# Patient Record
Sex: MALE | Race: WHITE | Employment: OTHER | URBAN - METROPOLITAN AREA
[De-identification: names, ages, dates, MRNs, and addresses within clinical notes are randomized per-mention and may not be internally consistent; named-entity substitution may affect disease eponyms.]

---

## 2017-01-09 ENCOUNTER — OFFICE VISIT (OUTPATIENT)
Dept: FAMILY MEDICINE CLINIC | Facility: CLINIC | Age: 82
End: 2017-01-09

## 2017-01-09 ENCOUNTER — LAB ENCOUNTER (OUTPATIENT)
Dept: LAB | Age: 82
End: 2017-01-09
Attending: FAMILY MEDICINE
Payer: MEDICARE

## 2017-01-09 VITALS
BODY MASS INDEX: 26.98 KG/M2 | RESPIRATION RATE: 16 BRPM | WEIGHT: 178 LBS | DIASTOLIC BLOOD PRESSURE: 72 MMHG | HEART RATE: 60 BPM | SYSTOLIC BLOOD PRESSURE: 126 MMHG | HEIGHT: 68 IN | TEMPERATURE: 98 F

## 2017-01-09 DIAGNOSIS — Z00.00 LABORATORY EXAMINATION ORDERED AS PART OF A ROUTINE GENERAL MEDICAL EXAMINATION: Primary | ICD-10-CM

## 2017-01-09 DIAGNOSIS — E11.9 TYPE 2 DIABETES MELLITUS WITHOUT COMPLICATION, WITHOUT LONG-TERM CURRENT USE OF INSULIN (HCC): ICD-10-CM

## 2017-01-09 DIAGNOSIS — Z00.00 LABORATORY EXAMINATION ORDERED AS PART OF A ROUTINE GENERAL MEDICAL EXAMINATION: ICD-10-CM

## 2017-01-09 LAB
ALBUMIN SERPL-MCNC: 3.9 G/DL (ref 3.5–4.8)
ALP LIVER SERPL-CCNC: 99 U/L (ref 45–117)
ALT SERPL-CCNC: 61 U/L (ref 17–63)
AST SERPL-CCNC: 32 U/L (ref 15–41)
BASOPHILS # BLD AUTO: 0.02 X10(3) UL (ref 0–0.1)
BASOPHILS NFR BLD AUTO: 0.3 %
BILIRUB SERPL-MCNC: 0.9 MG/DL (ref 0.1–2)
BUN BLD-MCNC: 16 MG/DL (ref 8–20)
CALCIUM BLD-MCNC: 8.7 MG/DL (ref 8.3–10.3)
CHLORIDE: 105 MMOL/L (ref 101–111)
CHOLEST SMN-MCNC: 139 MG/DL (ref ?–200)
CO2: 28 MMOL/L (ref 22–32)
CREAT BLD-MCNC: 1 MG/DL (ref 0.7–1.3)
EOSINOPHIL # BLD AUTO: 0.05 X10(3) UL (ref 0–0.3)
EOSINOPHIL NFR BLD AUTO: 0.6 %
ERYTHROCYTE [DISTWIDTH] IN BLOOD BY AUTOMATED COUNT: 11.9 % (ref 11.5–16)
GLUCOSE BLD-MCNC: 129 MG/DL (ref 70–99)
HCT VFR BLD AUTO: 46.1 % (ref 37–53)
HDLC SERPL-MCNC: 44 MG/DL (ref 45–?)
HDLC SERPL: 3.16 {RATIO} (ref ?–4.97)
HGB BLD-MCNC: 15.9 G/DL (ref 13–17)
IMMATURE GRANULOCYTE COUNT: 0.03 X10(3) UL (ref 0–1)
IMMATURE GRANULOCYTE RATIO %: 0.4 %
LDLC SERPL CALC-MCNC: 66 MG/DL (ref ?–130)
LYMPHOCYTES # BLD AUTO: 1.65 X10(3) UL (ref 0.9–4)
LYMPHOCYTES NFR BLD AUTO: 21.3 %
M PROTEIN MFR SERPL ELPH: 7.3 G/DL (ref 6.1–8.3)
MCH RBC QN AUTO: 30.8 PG (ref 27–33.2)
MCHC RBC AUTO-ENTMCNC: 34.5 G/DL (ref 31–37)
MCV RBC AUTO: 89.3 FL (ref 80–99)
MONOCYTES # BLD AUTO: 0.55 X10(3) UL (ref 0.1–0.6)
MONOCYTES NFR BLD AUTO: 7.1 %
NEUTROPHIL ABS PRELIM: 5.43 X10 (3) UL (ref 1.3–6.7)
NEUTROPHILS # BLD AUTO: 5.43 X10(3) UL (ref 1.3–6.7)
NEUTROPHILS NFR BLD AUTO: 70.3 %
NONHDLC SERPL-MCNC: 95 MG/DL (ref ?–130)
PLATELET # BLD AUTO: 258 10(3)UL (ref 150–450)
POTASSIUM SERPL-SCNC: 4.4 MMOL/L (ref 3.6–5.1)
PSA SERPL-MCNC: <0.01 NG/ML (ref 0.01–4)
RBC # BLD AUTO: 5.16 X10(6)UL (ref 3.8–5.8)
RED CELL DISTRIBUTION WIDTH-SD: 38.4 FL (ref 35.1–46.3)
SODIUM SERPL-SCNC: 139 MMOL/L (ref 136–144)
TRIGLYCERIDES: 145 MG/DL (ref ?–150)
TSI SER-ACNC: 0.56 MIU/ML (ref 0.35–5.5)
VLDL: 29 MG/DL (ref 5–40)
WBC # BLD AUTO: 7.7 X10(3) UL (ref 4–13)

## 2017-01-09 PROCEDURE — 85025 COMPLETE CBC W/AUTO DIFF WBC: CPT

## 2017-01-09 PROCEDURE — 36415 COLL VENOUS BLD VENIPUNCTURE: CPT

## 2017-01-09 PROCEDURE — 80061 LIPID PANEL: CPT

## 2017-01-09 PROCEDURE — 84153 ASSAY OF PSA TOTAL: CPT

## 2017-01-09 PROCEDURE — 84443 ASSAY THYROID STIM HORMONE: CPT

## 2017-01-09 PROCEDURE — 80053 COMPREHEN METABOLIC PANEL: CPT

## 2017-01-09 PROCEDURE — 99213 OFFICE O/P EST LOW 20 MIN: CPT | Performed by: FAMILY MEDICINE

## 2017-01-09 RX ORDER — GLIPIZIDE 2.5 MG/1
TABLET, EXTENDED RELEASE ORAL
Qty: 180 TABLET | Refills: 0 | Status: SHIPPED | OUTPATIENT
Start: 2017-01-09 | End: 2017-04-14

## 2017-01-09 RX ORDER — SOLIFENACIN SUCCINATE 10 MG/1
10 TABLET, FILM COATED ORAL
Qty: 90 TABLET | Refills: 3 | Status: CANCELLED | OUTPATIENT
Start: 2017-01-09

## 2017-01-09 RX ORDER — ROSUVASTATIN CALCIUM 10 MG/1
TABLET, COATED ORAL
Qty: 180 TABLET | Refills: 3 | Status: CANCELLED | OUTPATIENT
Start: 2017-01-09

## 2017-01-09 RX ORDER — GLIPIZIDE 2.5 MG/1
TABLET, EXTENDED RELEASE ORAL
Qty: 180 TABLET | Refills: 1 | Status: CANCELLED | OUTPATIENT
Start: 2017-01-09

## 2017-01-09 RX ORDER — SOLIFENACIN SUCCINATE 10 MG/1
10 TABLET, FILM COATED ORAL
Qty: 90 TABLET | Refills: 1 | Status: SHIPPED | OUTPATIENT
Start: 2017-01-09 | End: 2017-04-14 | Stop reason: ALTCHOICE

## 2017-01-09 RX ORDER — SIMVASTATIN 10 MG
10 TABLET ORAL DAILY
Qty: 90 TABLET | Refills: 1 | Status: SHIPPED | OUTPATIENT
Start: 2017-01-09 | End: 2017-04-14

## 2017-01-09 NOTE — PROGRESS NOTES
Type II diabetic here for general review he has worked hard at maintaining his A1c less than 6. He is primarily on glipizid. He takes ACE inhibitor and aspirin. In a happy note is about the celebrate his 89th birthday.     Skin is normal feet are under g

## 2017-01-24 ENCOUNTER — TELEPHONE (OUTPATIENT)
Dept: FAMILY MEDICINE CLINIC | Facility: CLINIC | Age: 82
End: 2017-01-24

## 2017-03-14 ENCOUNTER — TELEPHONE (OUTPATIENT)
Dept: FAMILY MEDICINE CLINIC | Facility: CLINIC | Age: 82
End: 2017-03-14

## 2017-03-14 RX ORDER — LOSARTAN POTASSIUM 100 MG/1
100 TABLET ORAL
Qty: 90 TABLET | Refills: 1 | Status: SHIPPED | OUTPATIENT
Start: 2017-03-14 | End: 2017-09-15

## 2017-03-14 RX ORDER — OMEPRAZOLE 20 MG/1
CAPSULE, DELAYED RELEASE ORAL
Qty: 90 CAPSULE | Refills: 3 | Status: SHIPPED | OUTPATIENT
Start: 2017-03-14 | End: 2018-03-12

## 2017-03-29 ENCOUNTER — TELEPHONE (OUTPATIENT)
Dept: FAMILY MEDICINE CLINIC | Facility: CLINIC | Age: 82
End: 2017-03-29

## 2017-04-14 ENCOUNTER — OFFICE VISIT (OUTPATIENT)
Dept: FAMILY MEDICINE CLINIC | Facility: CLINIC | Age: 82
End: 2017-04-14

## 2017-04-14 VITALS
SYSTOLIC BLOOD PRESSURE: 118 MMHG | RESPIRATION RATE: 18 BRPM | WEIGHT: 185 LBS | TEMPERATURE: 97 F | HEART RATE: 78 BPM | HEIGHT: 68 IN | BODY MASS INDEX: 28.04 KG/M2 | DIASTOLIC BLOOD PRESSURE: 70 MMHG

## 2017-04-14 DIAGNOSIS — E11.311: Primary | ICD-10-CM

## 2017-04-14 PROCEDURE — 99213 OFFICE O/P EST LOW 20 MIN: CPT | Performed by: FAMILY MEDICINE

## 2017-04-14 RX ORDER — VIT A/VIT C/VIT E/ZINC/COPPER 2148-113
TABLET ORAL
COMMUNITY
End: 2017-10-06

## 2017-04-14 RX ORDER — OXYBUTYNIN CHLORIDE 5 MG/1
5 TABLET, EXTENDED RELEASE ORAL DAILY
Qty: 90 TABLET | Refills: 3 | Status: SHIPPED | OUTPATIENT
Start: 2017-04-14 | End: 2017-04-29

## 2017-04-14 RX ORDER — AMLODIPINE BESYLATE 2.5 MG/1
2.5 TABLET ORAL
Qty: 90 TABLET | Refills: 3 | Status: SHIPPED | OUTPATIENT
Start: 2017-04-14 | End: 2017-04-29

## 2017-04-14 RX ORDER — SIMVASTATIN 10 MG
10 TABLET ORAL DAILY
Qty: 90 TABLET | Refills: 1 | Status: SHIPPED | OUTPATIENT
Start: 2017-04-14 | End: 2017-10-16

## 2017-04-14 RX ORDER — GLIPIZIDE 2.5 MG/1
TABLET, EXTENDED RELEASE ORAL
Qty: 180 TABLET | Refills: 0 | Status: SHIPPED | OUTPATIENT
Start: 2017-04-14 | End: 2017-07-18

## 2017-04-14 NOTE — PROGRESS NOTES
An elderly 31-year-old type II diabetic here for general care still having some issues with urinary frequency. The Vesicare did not seem to help as much as hoped.   Denies history of hypoglycemia I did take this time to review all his blood work which was mild non-bipolar depression. Assessment #3 metabolic syndrome. Assessment #4 BPH with obstruction    The plan stop Vesicare and start Lorri Butin and extended release 5 mg daily.   We will see him back for removal of the lesions and discussed the bene

## 2017-04-28 ENCOUNTER — OFFICE VISIT (OUTPATIENT)
Dept: FAMILY MEDICINE CLINIC | Facility: CLINIC | Age: 82
End: 2017-04-28

## 2017-04-28 VITALS
RESPIRATION RATE: 18 BRPM | SYSTOLIC BLOOD PRESSURE: 122 MMHG | DIASTOLIC BLOOD PRESSURE: 66 MMHG | BODY MASS INDEX: 29 KG/M2 | WEIGHT: 188.19 LBS | OXYGEN SATURATION: 98 % | TEMPERATURE: 98 F | HEART RATE: 75 BPM

## 2017-04-28 DIAGNOSIS — M25.522 PAIN OF BOTH ELBOWS: Primary | ICD-10-CM

## 2017-04-28 DIAGNOSIS — M25.521 PAIN OF BOTH ELBOWS: Primary | ICD-10-CM

## 2017-04-28 DIAGNOSIS — D36.13 NEUROFIBROMA OF LOWER EXTREMITY: ICD-10-CM

## 2017-04-28 PROCEDURE — 11400 EXC TR-EXT B9+MARG 0.5 CM<: CPT | Performed by: FAMILY MEDICINE

## 2017-04-28 NOTE — PROGRESS NOTES
Chronic but enlarging and symptomatic broad-based flesh-colored well demarcated soft lesion over lying the lateral aspect of the left knee does not look infected.   Neurovascular status normal    The assessment benign appearing neurofibroma left leg    The

## 2017-04-29 ENCOUNTER — APPOINTMENT (OUTPATIENT)
Dept: GENERAL RADIOLOGY | Facility: HOSPITAL | Age: 82
DRG: 378 | End: 2017-04-29
Attending: EMERGENCY MEDICINE
Payer: MEDICARE

## 2017-04-29 ENCOUNTER — HOSPITAL ENCOUNTER (INPATIENT)
Facility: HOSPITAL | Age: 82
LOS: 3 days | Discharge: HOME | DRG: 378 | End: 2017-05-02
Attending: EMERGENCY MEDICINE | Admitting: HOSPITALIST
Payer: MEDICARE

## 2017-04-29 DIAGNOSIS — K92.2 GASTROINTESTINAL HEMORRHAGE, UNSPECIFIED GASTROINTESTINAL HEMORRHAGE TYPE: Primary | ICD-10-CM

## 2017-04-29 PROCEDURE — 99223 1ST HOSP IP/OBS HIGH 75: CPT | Performed by: HOSPITALIST

## 2017-04-29 PROCEDURE — 71010 XR CHEST AP PORTABLE  (CPT=71010): CPT

## 2017-04-29 RX ORDER — SODIUM CHLORIDE 9 MG/ML
INJECTION, SOLUTION INTRAVENOUS CONTINUOUS
Status: DISCONTINUED | OUTPATIENT
Start: 2017-04-30 | End: 2017-05-01

## 2017-04-29 RX ORDER — SODIUM CHLORIDE 9 MG/ML
INJECTION, SOLUTION INTRAVENOUS CONTINUOUS
Status: CANCELLED | OUTPATIENT
Start: 2017-04-29 | End: 2017-04-29

## 2017-04-29 RX ORDER — DEXTROSE MONOHYDRATE 25 G/50ML
50 INJECTION, SOLUTION INTRAVENOUS
Status: DISCONTINUED | OUTPATIENT
Start: 2017-04-29 | End: 2017-05-01

## 2017-04-29 RX ORDER — ONDANSETRON 2 MG/ML
4 INJECTION INTRAMUSCULAR; INTRAVENOUS EVERY 4 HOURS PRN
Status: CANCELLED | OUTPATIENT
Start: 2017-04-29

## 2017-04-29 RX ORDER — ACETAMINOPHEN 325 MG/1
650 TABLET ORAL EVERY 6 HOURS PRN
Status: DISCONTINUED | OUTPATIENT
Start: 2017-04-29 | End: 2017-05-02

## 2017-04-29 RX ORDER — ONDANSETRON 2 MG/ML
4 INJECTION INTRAMUSCULAR; INTRAVENOUS EVERY 6 HOURS PRN
Status: DISCONTINUED | OUTPATIENT
Start: 2017-04-29 | End: 2017-05-02

## 2017-04-30 ENCOUNTER — ANESTHESIA (OUTPATIENT)
Dept: ENDOSCOPY | Facility: HOSPITAL | Age: 82
End: 2017-04-30

## 2017-04-30 ENCOUNTER — ANESTHESIA EVENT (OUTPATIENT)
Dept: ENDOSCOPY | Facility: HOSPITAL | Age: 82
End: 2017-04-30

## 2017-04-30 ENCOUNTER — SURGERY (OUTPATIENT)
Age: 82
End: 2017-04-30

## 2017-04-30 PROCEDURE — 99221 1ST HOSP IP/OBS SF/LOW 40: CPT | Performed by: FAMILY MEDICINE

## 2017-04-30 PROCEDURE — 99232 SBSQ HOSP IP/OBS MODERATE 35: CPT | Performed by: HOSPITALIST

## 2017-04-30 PROCEDURE — 0DJ08ZZ INSPECTION OF UPPER INTESTINAL TRACT, VIA NATURAL OR ARTIFICIAL OPENING ENDOSCOPIC: ICD-10-PCS | Performed by: STUDENT IN AN ORGANIZED HEALTH CARE EDUCATION/TRAINING PROGRAM

## 2017-04-30 RX ORDER — POTASSIUM CHLORIDE 14.9 MG/ML
20 INJECTION INTRAVENOUS ONCE
Status: COMPLETED | OUTPATIENT
Start: 2017-04-30 | End: 2017-04-30

## 2017-04-30 NOTE — OPERATIVE REPORT
EGD operative report  Patient Name: Miya Ortiz  Procedure: Esophagogastroduodenoscopy    Indication: hematochezia, melena  Attending: Meena Santa M.D. Consent:  The risks, benefits, and alternatives were discussed with the patient / POA.   Risks incl could be done as an outpatient, but given his age, will do tomorrow. He cannot recall his last colonoscopy. Recommendations:    - Clear liquid diet.   - NPO at midnight   - Colonoscopy with MAC tomorrow.   The potentially life-threatening complication of

## 2017-04-30 NOTE — H&P
AIYANA HOSPITALIST  History and Physical     Avery Sheldon Patient Status:  Emergency    1927 MRN [id number]   Location [facility] Attending Lu Cano MD   Hosp Day # 0 PCP Jyoti Emery DO     Chief Complaint: [address] N/A 2/25/2016    Comment Procedure: CYSTOSCOPY WITH URETHRAL DILATION;  Surgeon: Floresita Aj MD;  Location: [address] [place] [address]    PATIENT WITH PREOPERATIVE ORDER FOR IV ANTIBIOTIC SURGICA (CENTRUM SILVER) Oral Tab Take 1 Tab by mouth daily. Disp:  Rfl:    Omega-3 Fatty Acids (FISH OIL) 1000 MG Oral Cap Take  by mouth. Take two capsules daily Disp:  Rfl:    Magnesium 100 MG Oral Tab Take  by mouth.  Take two tablets daily Disp:  Rfl:        R CABG  1. Hold meds for now  3. DMII  1. SSI  4. GERD  1. IV PPI  5. Essential HTN  6. Dyslipidemia       Quality:  · DVT Prophylaxis: SCD  · CODE status: full  · Pérez: no    Plan of care discussed with patient.      Tessie Garcia DO  4/29/2017

## 2017-04-30 NOTE — ANESTHESIA PREPROCEDURE EVALUATION
PRE-OP EVALUATION    Patient Name: Nish Parish    Pre-op Diagnosis: Melena    Procedure(s): EGD      Surgeon(s) and Role:     * Xavier Rogers, MD - Primary    Pre-op vitals reviewed.   Temp: 98.6 °F (37 °C)  Pulse: 63  Resp: 18  BP: 136/65 mmHg  SpO2 200 each Rfl: 3   Aspirin 81 MG Oral Tab Take 81 mg by mouth daily. Disp:  Rfl:    Pyridoxine HCl ([employer] VITAMIN B-6) 200 MG Oral Tab Take 200 mg by mouth daily. Disp:  Rfl:    Multiple Vitamins-Minerals (CENTRUM SILVER) Oral Tab Take 1 Tab by mouth daily.  D Alcohol Use: No       Drug Use: No     Available pre-op labs reviewed.     Lab Results  Component Value Date   WBC 7.4 04/30/2017   RBC 3.91 04/30/2017   HGB 12.0* 04/30/2017   HCT 34.8* 04/30/2017   MCV 89.0 04/30/2017   MCH 30.7 04/30/2017   MCHC 34.5 04/

## 2017-04-30 NOTE — PLAN OF CARE
Maintains or returns to baseline bowel function Not Progressing      Electrolytes maintained within normal limits Not Progressing      Minimal or absence of nausea and vomiting Progressing      Glucose maintained within prescribed range Progressing      Re

## 2017-04-30 NOTE — ANESTHESIA POSTPROCEDURE EVALUATION
[address] [facility]. Patient Status:  Inpatient   Age/Gender 80year old male MRN [id number]   Location [facility]. Attending Dayton Machado MD   [facility] Day # 1 PCP Roopa Fears, DO       Anesthesia Post-op Note    Procedure(s):

## 2017-04-30 NOTE — ED INITIAL ASSESSMENT (HPI)
Since 6p this evening, patient had one tarry stool and one bright red stool, and also developed exertional dyspnea. Denies nausea/vomiting but notes diarrhea. Denies pain or dizziness of any kind at this time.

## 2017-04-30 NOTE — ADDENDUM NOTE
Addendum  created 04/30/17 1333 by Jude Carpenter MD    Modules edited: Anesthesia Review and Sign Navigator Section, Clinical Notes    Clinical Notes:  File: [id number]

## 2017-04-30 NOTE — PROGRESS NOTES
NURSING ADMISSION NOTE      Patient admitted via  Cart. Oriented to room. Safety precautions initiated. Bed in low position. Call light in reach. Pt aox4. Denies pain. V/S wnl. CBC q6hrs. Keep NPO.0.9NS infusing at 100cc/hr. So far no bloody stool. BS was 1

## 2017-04-30 NOTE — ED PROVIDER NOTES
Patient Seen in: [facility] Emergency Department    History   Patient presents with:  GI Bleeding (gastrointestinal)    Stated Complaint: gi bleeding    HPI    Patient is a pleasant 55-year-old male, presenting for evaluation of rectal bleeding.   Pa SURGICAL SITE INFECT N/A 2/25/2016    Comment Procedure: CYSTOSCOPY WITH URETHRAL DILATION;  Surgeon: Bin Ortega MD;  Location: [address] [place] [address]    PATIENT DOCUMENTED NOT TO HAVE EXPERIENCED ANY OF THE FOLLOWING EVENTS N/A 2/25/2016    Commen 2008 105   Resp 04/29/17 2008 20   Temp 04/29/17 2008 97.1 °F (36.2 °C)   Temp src 04/29/17 2008 Temporal   SpO2 04/29/17 2008 97 %   O2 Device 04/29/17 2008 None (Room air)       Current:BP 97/77 mmHg  Pulse 94  Temp(Src) 97.1 °F (36.2 °C) (Temporal)  Res following orders were created for panel order CBC WITH DIFFERENTIAL WITH PLATELET.   Procedure                               Abnormality         Status                     ---------                               -----------         ------ Madai Gould MD on 4/29/2017 at 21:33     Approved by: Rafiq Shultz MD            MDM     Patient was placed on a cart, an IV was established, and blood was drawn and sent to the laboratory for further evaluation.  The patient was attached to a cardiac mon

## 2017-04-30 NOTE — PROGRESS NOTES
AIYANA HOSPITALIST  Progress Note     Jeffreynabor Lord Patient Status:  Inpatient    1927 MRN [id number]   [facility] 4NW-A Attending Karen Day MD   Hosp Day # 1 PCP Torin Gary DO     Chief Complaint: GIB    S: Patient admits PLAN:     1. Gastrointestinal bleed  1. NPO  2. IVF  3. PPI  4. Monitor H&H  5. GI on consult  2. CAD sp CABG  3. Essential hypertension  4. Dyslipidemia  1. Aspirin, Losartan, Zocor  2. Monitor blood pressure and heart rate  5. Diabetes mellitus  1.  Hold

## 2017-05-01 ENCOUNTER — ANESTHESIA (OUTPATIENT)
Dept: ENDOSCOPY | Facility: HOSPITAL | Age: 82
End: 2017-05-01

## 2017-05-01 ENCOUNTER — ANESTHESIA EVENT (OUTPATIENT)
Dept: ENDOSCOPY | Facility: HOSPITAL | Age: 82
End: 2017-05-01

## 2017-05-01 ENCOUNTER — SURGERY (OUTPATIENT)
Age: 82
End: 2017-05-01

## 2017-05-01 PROCEDURE — 0DJD8ZZ INSPECTION OF LOWER INTESTINAL TRACT, VIA NATURAL OR ARTIFICIAL OPENING ENDOSCOPIC: ICD-10-PCS | Performed by: INTERNAL MEDICINE

## 2017-05-01 PROCEDURE — 99231 SBSQ HOSP IP/OBS SF/LOW 25: CPT | Performed by: HOSPITALIST

## 2017-05-01 RX ORDER — NALOXONE HYDROCHLORIDE 0.4 MG/ML
80 INJECTION, SOLUTION INTRAMUSCULAR; INTRAVENOUS; SUBCUTANEOUS AS NEEDED
Status: ACTIVE | OUTPATIENT
Start: 2017-05-01 | End: 2017-05-01

## 2017-05-01 RX ORDER — POTASSIUM CHLORIDE 20 MEQ/1
40 TABLET, EXTENDED RELEASE ORAL ONCE
Status: COMPLETED | OUTPATIENT
Start: 2017-05-01 | End: 2017-05-01

## 2017-05-01 RX ORDER — SODIUM CHLORIDE, SODIUM LACTATE, POTASSIUM CHLORIDE, CALCIUM CHLORIDE 600; 310; 30; 20 MG/100ML; MG/100ML; MG/100ML; MG/100ML
INJECTION, SOLUTION INTRAVENOUS CONTINUOUS
Status: ACTIVE | OUTPATIENT
Start: 2017-05-01 | End: 2017-05-01

## 2017-05-01 RX ORDER — DEXTROSE MONOHYDRATE 25 G/50ML
50 INJECTION, SOLUTION INTRAVENOUS
Status: DISCONTINUED | OUTPATIENT
Start: 2017-05-01 | End: 2017-05-02

## 2017-05-01 NOTE — PLAN OF CARE
Passing large amounts of yellowish,dark brownish liquids from rectum with just little stool sediments.

## 2017-05-01 NOTE — PROGRESS NOTES
AIYANA HOSPITALIST  Progress Note     Alexsandra Slipper Patient Status:  Inpatient    1927 MRN [id number]   [facility] 4NW-A Attending Army Radha MD   Hosp Day # 2 PCP Héctor Correach,      Chief Complaint: GIB    S: Patient irritab ER  40 mEq Oral Once   • insulin aspart  1-5 Units Subcutaneous TID CC and HS       ASSESSMENT / PLAN:     1. Gastrointestinal bleed, egd unremarkable 4/30  1. NPO  2. Stop IVF  3. For cscope today  4. GI on consult  2. CAD sp CABG  3.  Essential hypertensi

## 2017-05-01 NOTE — PLAN OF CARE
Maintains or returns to baseline bowel function Not Progressing      Minimal or absence of nausea and vomiting Progressing      Glucose maintained within prescribed range Progressing      Electrolytes maintained within normal limits Progressing      Return

## 2017-05-01 NOTE — ANESTHESIA POSTPROCEDURE EVALUATION
[facility]. Patient Status:  Inpatient   Age/Gender 80year old male MRN [id number]   Location 118 [facility]. Attending Max Stevens MD   [facility] Day # 2 PCP Lily Iqbal DO       Anesthesia Post-op Note    Procedure(s):

## 2017-05-01 NOTE — OPERATIVE REPORT
Operative Report-Colonoscopy    PREOPERATIVE DIAGNOSIS/INDICATION:  1. Hematochezia  2. Acute blood loss anemia  POSTOPERTATIVE DIAGNOSIS:  1.  Colonic ulcer at 40 cm with flat spot, associated with diverticu hemorrhoids.   RECOMMENDATIONS:   - Post Colonoscopy/polypectomy precautions, watch for bleeding, infection, perforation, adverse drug reactions   - Suspect above findings reflect a diverticular bleed which has since ceased  - Advance diet to soft  - Home i

## 2017-05-01 NOTE — ANESTHESIA PREPROCEDURE EVALUATION
PRE-OP EVALUATION    Patient Name: Neel Anand    Pre-op Diagnosis: Melena    Procedure(s): EGD      Surgeon(s) and Role:     * Carla Rogers MD - Primary    Pre-op vitals reviewed.   Temp: 97.9 °F (36.6 °C)  Pulse: 62  Resp: 18  BP: 146/68 mmHg  Sp omeprazole 20 MG Oral Capsule Delayed Release TAKE ONE CAPSULE (20MG) BY MOUTH EVERY DAY Disp: 90 capsule Rfl: 3   ONETOUCH ULTRA BLUE In Vitro Strip TEST TWO TIMES DAILY Disp: 200 strip Rfl: 3   ONETOUCH ULTRASOFT LANCETS Does not apply Misc TEST TWO TI DOCUMENTED NOT TO HAVE EXPERIENCED ANY OF THE FOLLOWING EVENTS N/A 2/25/2016    Comment Procedure: CYSTOSCOPY WITH URETHRAL DILATION;  Surgeon: Smooth Gar MD;  Location: [address] [place] [address]        Smoking status: Former Smoker     Smokeless toba

## 2017-05-01 NOTE — CM/SW NOTE
Attempted to complete CM/SW assessment. Pt is off the floor for a colonoscopy. Updated Dick Perez  on above.

## 2017-05-01 NOTE — CONSULTS
Gastroenterology Consult  I have personally seen and examined the patient.     Patient Name: Dominick Yip  Referring Physician: Dr Zainab Dove  Reason for Consult: hematochezia  CC: hematochezia  HPI: Dominick Yip is a 80year old male with a PMH of CAD, DMII, [facility]    Comment Procedure: CYSTOSCOPY WITH URETHRAL DILATION;  Surgeon: Kwadwo Proctor MD;  Location: [address] [place] [address]    PATIENT WITH PREOPERATIVE ORDER FOR IV ANTIBIOTIC CEM of colon cancer or other gastrointestinal malignancies; No family history of ulcer disease, or inflammatory bowel disease  ROS:  In addition to the pertinent positives described above:             Infectious Disease:  No chronic infections or recent fevers No hepatosplenomegaly; no rebound or guarding;  No ascites is clinically apparent; no tympany to percussion  Ext: No peripheral edema or cyanosis  Skin: Warm and dry  Psychiatric: Appropriate mood and congruent affect without obvious depression or anxiety

## 2017-05-01 NOTE — PLAN OF CARE
Minimal or absence of nausea and vomiting Progressing      Maintains or returns to baseline bowel function Progressing      Glucose maintained within prescribed range Progressing      Electrolytes maintained within normal limits Progressing      Assumed ca

## 2017-05-02 VITALS
SYSTOLIC BLOOD PRESSURE: 117 MMHG | HEART RATE: 65 BPM | DIASTOLIC BLOOD PRESSURE: 53 MMHG | TEMPERATURE: 98 F | HEIGHT: 68 IN | WEIGHT: 180.88 LBS | BODY MASS INDEX: 27.41 KG/M2 | OXYGEN SATURATION: 94 % | RESPIRATION RATE: 18 BRPM

## 2017-05-02 PROCEDURE — 99239 HOSP IP/OBS DSCHRG MGMT >30: CPT | Performed by: HOSPITALIST

## 2017-05-02 RX ORDER — LOSARTAN POTASSIUM 100 MG/1
100 TABLET ORAL
Status: DISCONTINUED | OUTPATIENT
Start: 2017-05-02 | End: 2017-05-02

## 2017-05-02 NOTE — DISCHARGE SUMMARY
AIYANA HOSPITALIST  DISCHARGE SUMMARY     Rehan Berrios Patient Status:  Inpatient    1927 MRN [id number]   [facility] 4NW-A Attending Max Stevens MD   [facility] Day # 3 PCP Lily Iqbal DO     Date of Admission: 2017  Date of loss with component of dilution, asymptomatic - no bleeding noted  1. Repeat H&H today  3. CAD sp CABG  4. Essential hypertension  5. Dyslipidemia  1. Aspirin on hold until cleared by GI  2. Resume Losartan  3. Zocor - held, resume on DC  4.  Monitor blood Follow-up appointment:   DO Kathe Unger [facility] [address] [place] [address]  [phone]    Call in 2 days      Alysia Jerome MD  [address] [place], [place] [id number]      As needed    This note is linked to that w

## 2017-05-02 NOTE — PLAN OF CARE
NURSING DISCHARGE NOTE    Discharged Home via Wheelchair. Accompanied by Family member  Belongings Taken by patient/family. AVS and education provided to patient and daughter.    Discharge plan of care and follow up needs discussed with patient/daug

## 2017-05-02 NOTE — PROGRESS NOTES
AIYANA HOSPITALIST  Progress Note     Sarthak Brown Patient Status:  Inpatient    1927 MRN [id number]   [facility] 4NW-A Attending Sorin Rausch MD   [facility] Day # 3 PCP Stephanie Lemus DO     Chief Complaint: GIB    S: Patient denies the last 72 hours. Imaging: Imaging data reviewed in Epic. Medications:   • losartan  100 mg Oral Daily   • insulin aspart  1-5 Units Subcutaneous TID CC and HS       ASSESSMENT / PLAN:     1.  Gastrointestinal bleed, egd unremarkable 4/30, cscop

## 2017-05-02 NOTE — PLAN OF CARE
A&Ox4, VSS. Denies any pain/discomfort, no SOB/dyspnea. Ambulatory, up with standby assist.   S/p colonoscopy 5/1, diverticulosis with no active bleeding. Tolerating low residue diet, no N/V/D. 1 BM this morning, soft and brown- no melena or BRPR noted.

## 2017-05-02 NOTE — PLAN OF CARE
Diabetes/Glucose Control    • Glucose maintained within prescribed range Progressing        GASTROINTESTINAL - ADULT    • Minimal or absence of nausea and vomiting Progressing    • Maintains or returns to baseline bowel function Progressing        METABOLI

## 2017-05-03 ENCOUNTER — PATIENT OUTREACH (OUTPATIENT)
Dept: CASE MANAGEMENT | Age: 82
End: 2017-05-03

## 2017-05-03 DIAGNOSIS — K92.2 GASTROINTESTINAL HEMORRHAGE, UNSPECIFIED GASTROINTESTINAL HEMORRHAGE TYPE: Primary | ICD-10-CM

## 2017-05-04 ENCOUNTER — TELEPHONE (OUTPATIENT)
Dept: FAMILY MEDICINE CLINIC | Facility: CLINIC | Age: 82
End: 2017-05-04

## 2017-05-04 NOTE — TELEPHONE ENCOUNTER
Offered pt appt tomorrow with Dr. Kristina Haro, pt refused appt, would only like to see Dr. Michael Marino, next available appt Monday at 12:30, appt made. Pt notified.

## 2017-05-04 NOTE — TELEPHONE ENCOUNTER
Patient discharged from [facility] on 5/2/17 and is at High risk for readmission, and should be seen by 5/9/17. NCM attempted to schedule TCM HFU patient declines at this time stating that he is going to call to schedule an appointment himself.      Sadaf Cintron

## 2017-05-04 NOTE — PROGRESS NOTES
Initial Post Discharge Follow Up   Discharge Date: 5/2/17  Contact Date: 5/3/2017    Consent Verification:  Assessment Completed With: Patient  HIPAA Verified?   Yes    Discharge Dx:   GI bleed    General:   • How have you been since your discharge from mouth daily. Disp:  Rfl:    Omega-3 Fatty Acids (FISH OIL) 1000 MG Oral Cap Take  by mouth. Take two capsules daily Disp:  Rfl:    Magnesium 100 MG Oral Tab Take  by mouth.  Take two tablets daily Disp:  Rfl:      • When you were leaving the hospital were a

## 2017-05-08 ENCOUNTER — TELEPHONE (OUTPATIENT)
Dept: FAMILY MEDICINE CLINIC | Facility: CLINIC | Age: 82
End: 2017-05-08

## 2017-05-18 ENCOUNTER — OFFICE VISIT (OUTPATIENT)
Dept: FAMILY MEDICINE CLINIC | Facility: CLINIC | Age: 82
End: 2017-05-18

## 2017-05-18 VITALS
BODY MASS INDEX: 28.49 KG/M2 | HEIGHT: 68 IN | RESPIRATION RATE: 18 BRPM | DIASTOLIC BLOOD PRESSURE: 64 MMHG | WEIGHT: 188 LBS | SYSTOLIC BLOOD PRESSURE: 112 MMHG | OXYGEN SATURATION: 97 % | HEART RATE: 68 BPM

## 2017-05-18 DIAGNOSIS — D22.72: Primary | ICD-10-CM

## 2017-05-18 DIAGNOSIS — D49.2 ABNORMAL SKIN GROWTH: ICD-10-CM

## 2017-05-18 PROCEDURE — 88341 IMHCHEM/IMCYTCHM EA ADD ANTB: CPT | Performed by: FAMILY MEDICINE

## 2017-05-18 PROCEDURE — 99213 OFFICE O/P EST LOW 20 MIN: CPT | Performed by: FAMILY MEDICINE

## 2017-05-18 PROCEDURE — 88305 TISSUE EXAM BY PATHOLOGIST: CPT | Performed by: FAMILY MEDICINE

## 2017-05-18 PROCEDURE — 88342 IMHCHEM/IMCYTCHM 1ST ANTB: CPT | Performed by: FAMILY MEDICINE

## 2017-05-18 NOTE — PROGRESS NOTES
Here for final removal of a 2 cm dome-shaped lesion overlying the head of the left proximal fibula I tied it off using the ligature strangulation and most of the lesion has become necrotic a small amount refuses to change requiring an excision and biopsy.

## 2017-05-30 ENCOUNTER — TELEPHONE (OUTPATIENT)
Dept: FAMILY MEDICINE CLINIC | Facility: CLINIC | Age: 82
End: 2017-05-30

## 2017-06-01 ENCOUNTER — LAB ENCOUNTER (OUTPATIENT)
Dept: LAB | Age: 82
End: 2017-06-01
Attending: FAMILY MEDICINE
Payer: MEDICARE

## 2017-06-01 ENCOUNTER — OFFICE VISIT (OUTPATIENT)
Dept: FAMILY MEDICINE CLINIC | Facility: CLINIC | Age: 82
End: 2017-06-01

## 2017-06-01 VITALS
HEIGHT: 68 IN | DIASTOLIC BLOOD PRESSURE: 62 MMHG | RESPIRATION RATE: 18 BRPM | BODY MASS INDEX: 27.74 KG/M2 | WEIGHT: 183 LBS | SYSTOLIC BLOOD PRESSURE: 118 MMHG | HEART RATE: 85 BPM | TEMPERATURE: 98 F | OXYGEN SATURATION: 96 %

## 2017-06-01 DIAGNOSIS — D49.2: ICD-10-CM

## 2017-06-01 DIAGNOSIS — D62 ACUTE BLOOD LOSS ANEMIA: Primary | ICD-10-CM

## 2017-06-01 DIAGNOSIS — D62 ACUTE BLOOD LOSS ANEMIA: ICD-10-CM

## 2017-06-01 PROCEDURE — 36415 COLL VENOUS BLD VENIPUNCTURE: CPT

## 2017-06-01 PROCEDURE — 85025 COMPLETE CBC W/AUTO DIFF WBC: CPT

## 2017-06-01 PROCEDURE — 99213 OFFICE O/P EST LOW 20 MIN: CPT | Performed by: FAMILY MEDICINE

## 2017-06-01 PROCEDURE — 82728 ASSAY OF FERRITIN: CPT

## 2017-06-01 PROCEDURE — 85045 AUTOMATED RETICULOCYTE COUNT: CPT

## 2017-06-01 NOTE — PROGRESS NOTES
Here on my request for follow-up of an operative site left lateral knee that I removed and sent for biopsy. 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     I discussed these findings with  the pa

## 2017-06-22 ENCOUNTER — TELEPHONE (OUTPATIENT)
Dept: FAMILY MEDICINE CLINIC | Facility: CLINIC | Age: 82
End: 2017-06-22

## 2017-06-22 RX ORDER — LANCETS
EACH MISCELLANEOUS
Qty: 200 EACH | Refills: 3 | Status: SHIPPED | OUTPATIENT
Start: 2017-06-22 | End: 2018-07-25

## 2017-07-18 RX ORDER — GLIPIZIDE 2.5 MG/1
TABLET, EXTENDED RELEASE ORAL
Qty: 180 TABLET | Refills: 0 | Status: SHIPPED | OUTPATIENT
Start: 2017-07-18 | End: 2017-09-21

## 2017-08-10 ENCOUNTER — OFFICE VISIT (OUTPATIENT)
Dept: FAMILY MEDICINE CLINIC | Facility: CLINIC | Age: 82
End: 2017-08-10

## 2017-08-10 VITALS
TEMPERATURE: 98 F | DIASTOLIC BLOOD PRESSURE: 80 MMHG | RESPIRATION RATE: 18 BRPM | SYSTOLIC BLOOD PRESSURE: 132 MMHG | BODY MASS INDEX: 28.34 KG/M2 | WEIGHT: 187 LBS | OXYGEN SATURATION: 98 % | HEIGHT: 68 IN | HEART RATE: 80 BPM

## 2017-08-10 DIAGNOSIS — E11.69 TYPE 2 DIABETES MELLITUS WITH OTHER SPECIFIED COMPLICATION (HCC): Primary | ICD-10-CM

## 2017-08-10 PROCEDURE — 99213 OFFICE O/P EST LOW 20 MIN: CPT | Performed by: FAMILY MEDICINE

## 2017-08-10 RX ORDER — OXYBUTYNIN CHLORIDE 5 MG/1
TABLET, EXTENDED RELEASE ORAL
Refills: 2 | COMMUNITY
Start: 2017-07-18 | End: 2017-09-21

## 2017-08-10 RX ORDER — AMLODIPINE BESYLATE 2.5 MG/1
TABLET ORAL
Refills: 2 | COMMUNITY
Start: 2017-07-18 | End: 2018-01-03 | Stop reason: ALTCHOICE

## 2017-08-10 NOTE — PROGRESS NOTES
Delgado Alcaraz is here primarily for 2 reasons first is to discuss his blood sugar control and second is to discuss his bladder urgency    Problem #1 has to do with his type 2 diabetes.   Although his fasting sugars in the morning are all within a wonderful range of 1

## 2017-08-22 ENCOUNTER — MYAURORA ACCOUNT LINK (OUTPATIENT)
Dept: OTHER | Age: 82
End: 2017-08-22

## 2017-08-22 ENCOUNTER — PRIOR ORIGINAL RECORDS (OUTPATIENT)
Dept: OTHER | Age: 82
End: 2017-08-22

## 2017-08-28 ENCOUNTER — TELEPHONE (OUTPATIENT)
Dept: FAMILY MEDICINE CLINIC | Facility: CLINIC | Age: 82
End: 2017-08-28

## 2017-08-28 NOTE — TELEPHONE ENCOUNTER
Pt states was on oxybutynin, this was discontinued at 8/10/17. Was given samples of vesicare 5mg, however pt states he was on 10mg previously and still has lots of pills left, okay for pt to take 10mg of vesicare?

## 2017-08-30 ENCOUNTER — TELEPHONE (OUTPATIENT)
Dept: FAMILY MEDICINE CLINIC | Facility: CLINIC | Age: 82
End: 2017-08-30

## 2017-09-15 RX ORDER — LOSARTAN POTASSIUM 100 MG/1
100 TABLET ORAL
Qty: 90 TABLET | Refills: 0 | Status: SHIPPED | OUTPATIENT
Start: 2017-09-15 | End: 2017-12-13

## 2017-09-21 ENCOUNTER — OFFICE VISIT (OUTPATIENT)
Dept: FAMILY MEDICINE CLINIC | Facility: CLINIC | Age: 82
End: 2017-09-21

## 2017-09-21 VITALS
RESPIRATION RATE: 18 BRPM | TEMPERATURE: 98 F | WEIGHT: 189 LBS | BODY MASS INDEX: 28.64 KG/M2 | HEIGHT: 68 IN | DIASTOLIC BLOOD PRESSURE: 60 MMHG | OXYGEN SATURATION: 98 % | SYSTOLIC BLOOD PRESSURE: 120 MMHG | HEART RATE: 69 BPM

## 2017-09-21 DIAGNOSIS — N32.81 OVERACTIVE BLADDER: Primary | ICD-10-CM

## 2017-09-21 PROCEDURE — 99213 OFFICE O/P EST LOW 20 MIN: CPT | Performed by: FAMILY MEDICINE

## 2017-09-21 RX ORDER — GLIPIZIDE 2.5 MG/1
TABLET, EXTENDED RELEASE ORAL
Qty: 90 TABLET | Refills: 6 | Status: SHIPPED | OUTPATIENT
Start: 2017-09-21 | End: 2017-10-06

## 2017-09-21 RX ORDER — OXYBUTYNIN CHLORIDE 10 MG/1
10 TABLET, EXTENDED RELEASE ORAL DAILY
Qty: 90 TABLET | Refills: 3 | Status: SHIPPED | OUTPATIENT
Start: 2017-09-21 | End: 2018-06-07 | Stop reason: ALTCHOICE

## 2017-09-21 NOTE — PROGRESS NOTES
Here for follow-up of his blood sugars and his urinary urgency and stress. He finds that the Vesicare is actually no more effective than his oxybutynin.   Because of the subsequent monetary differences we switched him today back to oxybutynin but at higher

## 2017-10-06 ENCOUNTER — OFFICE VISIT (OUTPATIENT)
Dept: FAMILY MEDICINE CLINIC | Facility: CLINIC | Age: 82
End: 2017-10-06

## 2017-10-06 ENCOUNTER — TELEPHONE (OUTPATIENT)
Dept: FAMILY MEDICINE CLINIC | Facility: CLINIC | Age: 82
End: 2017-10-06

## 2017-10-06 VITALS
HEIGHT: 68 IN | HEART RATE: 76 BPM | TEMPERATURE: 98 F | SYSTOLIC BLOOD PRESSURE: 134 MMHG | BODY MASS INDEX: 28.34 KG/M2 | RESPIRATION RATE: 18 BRPM | DIASTOLIC BLOOD PRESSURE: 78 MMHG | WEIGHT: 187 LBS

## 2017-10-06 DIAGNOSIS — I80.9 PHLEBITIS ALONE: Primary | ICD-10-CM

## 2017-10-06 PROCEDURE — 99214 OFFICE O/P EST MOD 30 MIN: CPT | Performed by: FAMILY MEDICINE

## 2017-10-06 RX ORDER — FUROSEMIDE 40 MG/1
TABLET ORAL
Qty: 120 TABLET | Refills: 1 | Status: SHIPPED | OUTPATIENT
Start: 2017-10-06 | End: 2017-10-06

## 2017-10-06 RX ORDER — FUROSEMIDE 20 MG/1
20 TABLET ORAL 2 TIMES DAILY
Qty: 120 TABLET | Refills: 1 | Status: SHIPPED | OUTPATIENT
Start: 2017-10-06 | End: 2018-01-29

## 2017-10-06 RX ORDER — GLIPIZIDE 2.5 MG/1
TABLET, EXTENDED RELEASE ORAL
Qty: 90 TABLET | Refills: 0 | COMMUNITY
Start: 2017-10-06 | End: 2017-11-14

## 2017-10-06 NOTE — PROGRESS NOTES
Per Dr. Bessie Carbajal,  Glipizide 2.5 mg dose change. Currently taking 1 tab in the am and 2 tabs in the evening. Per Dr. Bessie Carbajal to increase to 2 tabs in the am, 2 tabs in the evening. Patient informed. Med list amended.

## 2017-10-06 NOTE — PROGRESS NOTES
Arrives today because for the past 3-5 days he has developed nonpainful pitting swelling of both lower extremities especially in the right side in a stocking-like distribution denies trauma.   He has type II diabetic with some recent slight worsening of his

## 2017-10-13 ENCOUNTER — OFFICE VISIT (OUTPATIENT)
Dept: FAMILY MEDICINE CLINIC | Facility: CLINIC | Age: 82
End: 2017-10-13

## 2017-10-13 ENCOUNTER — LAB ENCOUNTER (OUTPATIENT)
Dept: LAB | Age: 82
End: 2017-10-13
Attending: FAMILY MEDICINE
Payer: MEDICARE

## 2017-10-13 VITALS
OXYGEN SATURATION: 98 % | RESPIRATION RATE: 18 BRPM | HEART RATE: 82 BPM | HEIGHT: 68 IN | SYSTOLIC BLOOD PRESSURE: 110 MMHG | DIASTOLIC BLOOD PRESSURE: 56 MMHG | TEMPERATURE: 98 F | WEIGHT: 188 LBS | BODY MASS INDEX: 28.49 KG/M2

## 2017-10-13 DIAGNOSIS — R53.83 FATIGUE, UNSPECIFIED TYPE: ICD-10-CM

## 2017-10-13 DIAGNOSIS — E11.9 DIABETES MELLITUS TYPE 2 IN NONOBESE (HCC): ICD-10-CM

## 2017-10-13 DIAGNOSIS — R60.9 EDEMA, UNSPECIFIED TYPE: ICD-10-CM

## 2017-10-13 DIAGNOSIS — R60.9 EDEMA, UNSPECIFIED TYPE: Primary | ICD-10-CM

## 2017-10-13 PROCEDURE — 83036 HEMOGLOBIN GLYCOSYLATED A1C: CPT

## 2017-10-13 PROCEDURE — 85025 COMPLETE CBC W/AUTO DIFF WBC: CPT

## 2017-10-13 PROCEDURE — 99213 OFFICE O/P EST LOW 20 MIN: CPT | Performed by: FAMILY MEDICINE

## 2017-10-13 PROCEDURE — 84443 ASSAY THYROID STIM HORMONE: CPT

## 2017-10-13 PROCEDURE — 90653 IIV ADJUVANT VACCINE IM: CPT | Performed by: FAMILY MEDICINE

## 2017-10-13 PROCEDURE — 80053 COMPREHEN METABOLIC PANEL: CPT

## 2017-10-13 PROCEDURE — 36415 COLL VENOUS BLD VENIPUNCTURE: CPT

## 2017-10-13 PROCEDURE — G0008 ADMIN INFLUENZA VIRUS VAC: HCPCS | Performed by: FAMILY MEDICINE

## 2017-10-13 NOTE — PROGRESS NOTES
Presents for suggested follow-up. His previously determined bilateral lower extremity edema is much improved and the redness is now absent. Antibiotics were obviously not indicated at this point. He does have some mild pitting nontender edema however. No

## 2017-10-16 RX ORDER — SIMVASTATIN 10 MG
10 TABLET ORAL DAILY
Qty: 90 TABLET | Refills: 0 | Status: SHIPPED | OUTPATIENT
Start: 2017-10-16 | End: 2018-01-29

## 2017-11-14 RX ORDER — GLIPIZIDE 2.5 MG/1
TABLET, EXTENDED RELEASE ORAL
Qty: 90 TABLET | Refills: 0 | Status: SHIPPED | OUTPATIENT
Start: 2017-11-14 | End: 2017-12-13

## 2017-11-27 ENCOUNTER — OFFICE VISIT (OUTPATIENT)
Dept: FAMILY MEDICINE CLINIC | Facility: CLINIC | Age: 82
End: 2017-11-27

## 2017-11-27 ENCOUNTER — TELEPHONE (OUTPATIENT)
Dept: FAMILY MEDICINE CLINIC | Facility: CLINIC | Age: 82
End: 2017-11-27

## 2017-11-27 VITALS — HEART RATE: 66 BPM | BODY MASS INDEX: 28 KG/M2 | OXYGEN SATURATION: 98 % | WEIGHT: 181 LBS | RESPIRATION RATE: 18 BRPM

## 2017-11-27 DIAGNOSIS — E11.8 TYPE 2 DIABETES MELLITUS WITH COMPLICATION, WITHOUT LONG-TERM CURRENT USE OF INSULIN (HCC): Primary | ICD-10-CM

## 2017-11-27 DIAGNOSIS — Z00.00 ROUTINE GENERAL MEDICAL EXAMINATION AT A HEALTH CARE FACILITY: ICD-10-CM

## 2017-11-27 PROCEDURE — G0439 PPPS, SUBSEQ VISIT: HCPCS | Performed by: FAMILY MEDICINE

## 2017-11-27 PROCEDURE — 99213 OFFICE O/P EST LOW 20 MIN: CPT | Performed by: FAMILY MEDICINE

## 2017-11-27 NOTE — PROGRESS NOTES
Here for adjustment of his blood sugars. He is still running, in spite of are changing his glipizide to 5 mg long-acting twice a day is running fasting sugars or postprandial sugars in the high 100s or sometimes in the mid to high 200s.   He does have urin

## 2017-11-27 NOTE — TELEPHONE ENCOUNTER
Pt given januvia at today's visit. Per Barbara Lozada do not stop glipizide. Januvia is in addition to the glipizide. Call pt after 3:00 to notify him.

## 2017-11-27 NOTE — TELEPHONE ENCOUNTER
Pt wants to know if he should stip the glypizide because Dr. Obed Sterling gave him a new RX today at his visit. Pls call to advise. Call around 3pm.  He will be out till then.

## 2017-11-28 ENCOUNTER — TELEPHONE (OUTPATIENT)
Dept: FAMILY MEDICINE CLINIC | Facility: CLINIC | Age: 82
End: 2017-11-28

## 2017-11-28 NOTE — TELEPHONE ENCOUNTER
Spoke with patient. 1) Januvia cost is too high for pt, 30 days is over $400.00  Please advise. FYI:   2) pt never increased his Oxybutynin dose from 5mg to 10mg as JG instructed in August 2017, but continued on the 5mg.  Pt will now switch to the 10mg

## 2017-12-01 NOTE — PROGRESS NOTES
Sachin Carrero is a 80year old male who presents for a Medicare Annual Wellness visit.      Patient Care Team: Patient Care Team:  Ziggy Castro DO as PCP - [facility] [place])    Patient Active Problem List:     Other testicular hypofunction tablet (10 mg total) by mouth daily. Disp: 90 tablet Rfl: 3   LOSARTAN 100 MG Oral Tab TAKE 1 TABLET (100 MG TOTAL) BY MOUTH ONCE DAILY.  Disp: 90 tablet Rfl: 0   AmLODIPine Besylate 2.5 MG Oral Tab  Disp:  Rfl: 2   ONETOUCH ULTRASOFT LANCETS Does not apply 10/13/2017   BUN 11 05/01/2017   BUN 19 04/30/2017   CREATSERUM 0.91 10/13/2017   CREATSERUM 0.87 05/01/2017   CREATSERUM 0.92 04/30/2017       Lab Results  Component Value Date   PSA <0.010 (L) 01/09/2017   PSA <0.010 (L) 09/13/2013   PSA 0.01 08/21/2012 ?: Yes    Do you have a living will?: Yes     Please go to \"Cognitive Assessment\" under Medicare Assessment section in Charting, test patient and document. .    Then, refresh your progress note to see your input here.   Cognitive Assessment     W VACC, 13 JAYMIE IM    Update Immunization Activity if applicable    Hepatitis B No orders found for this or any previous visit. Update Immunization Activity if applicable    Tetanus No orders found for this or any previous visit.  Update Immunization Activity DAILY Disp: 90 tablet Rfl: 0   furosemide 20 MG Oral Tab Take 1 tablet (20 mg total) by mouth 2 (two) times daily. Disp: 120 tablet Rfl: 1   Oxybutynin Chloride ER 10 MG Oral Tablet 24 Hr Take 1 tablet (10 mg total) by mouth daily.  Disp: 90 tablet Rfl: 3 hereditary and idiopathic peripheral neuropathy    • Urethral stricture       Past Surgical History:  01/01/2007: CABG  2/25/2016: Valdo Ch N/A      Comment: Procedure: CIRCUMCISION ADULT;  Surgeon:                Skylar Felix MD;  Location: D Diagnoses and all orders for this visit:    Type 2 diabetes mellitus with complication, without long-term current use of insulin (HCC)  Trial of low-dose Januvia 50 mg daily  Medication compliance enforced    Routine general medical examination at a heal

## 2017-12-08 NOTE — TELEPHONE ENCOUNTER
Per JG: have pt not start the Januvia, continue on Glipizide ER 2.5mg 2 tablets BID. Pt informed and expressed understanding and agreement. Task completed.

## 2017-12-13 RX ORDER — GLIPIZIDE 2.5 MG/1
TABLET, EXTENDED RELEASE ORAL
Qty: 90 TABLET | Refills: 0 | Status: SHIPPED | OUTPATIENT
Start: 2017-12-13 | End: 2017-12-28

## 2017-12-13 RX ORDER — LOSARTAN POTASSIUM 100 MG/1
TABLET ORAL
Qty: 90 TABLET | Refills: 0 | Status: SHIPPED | OUTPATIENT
Start: 2017-12-13 | End: 2018-03-12

## 2017-12-28 ENCOUNTER — TELEPHONE (OUTPATIENT)
Dept: FAMILY MEDICINE CLINIC | Facility: CLINIC | Age: 82
End: 2017-12-28

## 2017-12-28 RX ORDER — GLIPIZIDE 2.5 MG/1
TABLET, EXTENDED RELEASE ORAL
Qty: 360 TABLET | Refills: 0 | Status: SHIPPED | OUTPATIENT
Start: 2017-12-28 | End: 2018-01-29

## 2017-12-28 NOTE — TELEPHONE ENCOUNTER
GlipiZIDE ER 2.5 MG Oral Tablet 24 Hr, given 90 tablets, should have been 90 day supply, he takes 4 a day, 90 tablets is not 90 days supply, please send in 90 day supply    Pharmacy also said pt reported [facility] [place] was too expensive and he will not take it.

## 2018-01-03 ENCOUNTER — OFFICE VISIT (OUTPATIENT)
Dept: FAMILY MEDICINE CLINIC | Facility: CLINIC | Age: 83
End: 2018-01-03

## 2018-01-03 VITALS
DIASTOLIC BLOOD PRESSURE: 86 MMHG | RESPIRATION RATE: 18 BRPM | HEIGHT: 68 IN | TEMPERATURE: 97 F | HEART RATE: 92 BPM | SYSTOLIC BLOOD PRESSURE: 116 MMHG | BODY MASS INDEX: 28.49 KG/M2 | WEIGHT: 188 LBS | OXYGEN SATURATION: 96 %

## 2018-01-03 DIAGNOSIS — L03.115 CELLULITIS OF RIGHT LOWER EXTREMITY: ICD-10-CM

## 2018-01-03 DIAGNOSIS — E11.9 TYPE 2 DIABETES MELLITUS NOT AT GOAL (HCC): Primary | ICD-10-CM

## 2018-01-03 PROCEDURE — 99214 OFFICE O/P EST MOD 30 MIN: CPT | Performed by: FAMILY MEDICINE

## 2018-01-03 RX ORDER — AMOXICILLIN AND CLAVULANATE POTASSIUM 875; 125 MG/1; MG/1
1 TABLET, FILM COATED ORAL 2 TIMES DAILY
Qty: 28 TABLET | Refills: 1 | Status: SHIPPED | OUTPATIENT
Start: 2018-01-03 | End: 2018-01-13

## 2018-01-03 NOTE — PROGRESS NOTES
This gentleman presents with swelling of his right foot and distal leg. There is minimal discomfort there is no shortness of breath or chest pain.   He is a type II diabetic who struggles occasionally to get his numbers under perfect control but nonetheles

## 2018-01-06 ENCOUNTER — OFFICE VISIT (OUTPATIENT)
Dept: FAMILY MEDICINE CLINIC | Facility: CLINIC | Age: 83
End: 2018-01-06

## 2018-01-06 VITALS
SYSTOLIC BLOOD PRESSURE: 130 MMHG | DIASTOLIC BLOOD PRESSURE: 80 MMHG | BODY MASS INDEX: 28.49 KG/M2 | OXYGEN SATURATION: 98 % | TEMPERATURE: 99 F | HEIGHT: 68 IN | WEIGHT: 188 LBS | RESPIRATION RATE: 20 BRPM | HEART RATE: 86 BPM

## 2018-01-06 DIAGNOSIS — L02.619 CELLULITIS AND ABSCESS OF FOOT: Primary | ICD-10-CM

## 2018-01-06 DIAGNOSIS — L03.119 CELLULITIS AND ABSCESS OF FOOT: Primary | ICD-10-CM

## 2018-01-06 PROCEDURE — 99213 OFFICE O/P EST LOW 20 MIN: CPT | Performed by: FAMILY MEDICINE

## 2018-01-06 NOTE — PROGRESS NOTES
Here for requested follow-up I just saw him a day and a half ago for a warm pink swollen right lower extremity and heavily onychomycotic fourth digit of the same foot I started him on Augmentin 875 twice a day which he apparently has been taken.   Causing s

## 2018-01-08 ENCOUNTER — OFFICE VISIT (OUTPATIENT)
Dept: FAMILY MEDICINE CLINIC | Facility: CLINIC | Age: 83
End: 2018-01-08

## 2018-01-08 VITALS
RESPIRATION RATE: 16 BRPM | HEIGHT: 68 IN | WEIGHT: 185 LBS | SYSTOLIC BLOOD PRESSURE: 126 MMHG | TEMPERATURE: 99 F | HEART RATE: 88 BPM | DIASTOLIC BLOOD PRESSURE: 62 MMHG | BODY MASS INDEX: 28.04 KG/M2

## 2018-01-08 DIAGNOSIS — L02.619 CELLULITIS AND ABSCESS OF FOOT: Primary | ICD-10-CM

## 2018-01-08 DIAGNOSIS — L03.119 CELLULITIS AND ABSCESS OF FOOT: Primary | ICD-10-CM

## 2018-01-08 PROCEDURE — 99213 OFFICE O/P EST LOW 20 MIN: CPT | Performed by: FAMILY MEDICINE

## 2018-01-08 NOTE — PROGRESS NOTES
Third visit for the same problem namely that of a cellulitis of his right leg. In spite of him being on Augmentin and advanced dosages of furosemide the redness pink edema persists. The fourth digit is swollen.   There is no drainage and no break of the s

## 2018-01-09 ENCOUNTER — OFFICE VISIT (OUTPATIENT)
Dept: WOUND CARE | Facility: HOSPITAL | Age: 83
End: 2018-01-09
Attending: NURSE PRACTITIONER
Payer: MEDICARE

## 2018-01-09 ENCOUNTER — PRIOR ORIGINAL RECORDS (OUTPATIENT)
Dept: OTHER | Age: 83
End: 2018-01-09

## 2018-01-09 ENCOUNTER — HOSPITAL ENCOUNTER (OUTPATIENT)
Dept: LAB | Facility: HOSPITAL | Age: 83
Discharge: HOME | End: 2018-01-09
Attending: NURSE PRACTITIONER
Payer: MEDICARE

## 2018-01-09 DIAGNOSIS — L97.511 NON-PRESSURE CHRONIC ULCER OF OTHER PART OF RIGHT FOOT LIMITED TO BREAKDOWN OF SKIN (HCC): Primary | ICD-10-CM

## 2018-01-09 DIAGNOSIS — L97.509 DIABETIC FOOT ULCER (HCC): ICD-10-CM

## 2018-01-09 DIAGNOSIS — L97.509 DIABETIC FOOT ULCERS (HCC): ICD-10-CM

## 2018-01-09 DIAGNOSIS — E11.621 DIABETIC FOOT ULCERS (HCC): ICD-10-CM

## 2018-01-09 DIAGNOSIS — E11.621 DIABETIC FOOT ULCER (HCC): ICD-10-CM

## 2018-01-09 LAB
ALBUMIN SERPL-MCNC: 3.7 G/DL (ref 3.5–4.8)
ALP LIVER SERPL-CCNC: 122 U/L (ref 45–117)
ALT SERPL-CCNC: 56 U/L (ref 17–63)
AST SERPL-CCNC: 41 U/L (ref 15–41)
BASOPHILS # BLD AUTO: 0.04 X10(3) UL (ref 0–0.1)
BASOPHILS NFR BLD AUTO: 0.4 %
BILIRUB SERPL-MCNC: 0.6 MG/DL (ref 0.1–2)
BUN BLD-MCNC: 19 MG/DL (ref 8–20)
CALCIUM BLD-MCNC: 9 MG/DL (ref 8.3–10.3)
CHLORIDE: 109 MMOL/L (ref 101–111)
CO2: 27 MMOL/L (ref 22–32)
CREAT BLD-MCNC: 0.99 MG/DL (ref 0.7–1.3)
EOSINOPHIL # BLD AUTO: 0.11 X10(3) UL (ref 0–0.3)
EOSINOPHIL NFR BLD AUTO: 1.1 %
ERYTHROCYTE [DISTWIDTH] IN BLOOD BY AUTOMATED COUNT: 11.8 % (ref 11.5–16)
EST. AVERAGE GLUCOSE BLD GHB EST-MCNC: 143 MG/DL (ref 68–126)
GLUCOSE BLD-MCNC: 114 MG/DL (ref 70–99)
HBA1C MFR BLD HPLC: 6.6 % (ref ?–5.7)
HCT VFR BLD AUTO: 46.2 % (ref 37–53)
HGB BLD-MCNC: 15.7 G/DL (ref 13–17)
IMMATURE GRANULOCYTE COUNT: 0.04 X10(3) UL (ref 0–1)
IMMATURE GRANULOCYTE RATIO %: 0.4 %
LYMPHOCYTES # BLD AUTO: 2.12 X10(3) UL (ref 0.9–4)
LYMPHOCYTES NFR BLD AUTO: 20.7 %
M PROTEIN MFR SERPL ELPH: 7.5 G/DL (ref 6.1–8.3)
MCH RBC QN AUTO: 29.8 PG (ref 27–33.2)
MCHC RBC AUTO-ENTMCNC: 34 G/DL (ref 31–37)
MCV RBC AUTO: 87.7 FL (ref 80–99)
MONOCYTES # BLD AUTO: 0.94 X10(3) UL (ref 0.1–0.6)
MONOCYTES NFR BLD AUTO: 9.2 %
NEUTROPHIL ABS PRELIM: 7 X10 (3) UL (ref 1.3–6.7)
NEUTROPHILS # BLD AUTO: 7 X10(3) UL (ref 1.3–6.7)
NEUTROPHILS NFR BLD AUTO: 68.2 %
PLATELET # BLD AUTO: 274 10(3)UL (ref 150–450)
POTASSIUM SERPL-SCNC: 4 MMOL/L (ref 3.6–5.1)
PREALBUMIN: 24.5 MG/DL (ref 20–40)
RBC # BLD AUTO: 5.27 X10(6)UL (ref 3.8–5.8)
RED CELL DISTRIBUTION WIDTH-SD: 38 FL (ref 35.1–46.3)
SODIUM SERPL-SCNC: 144 MMOL/L (ref 136–144)
WBC # BLD AUTO: 10.3 X10(3) UL (ref 4–13)

## 2018-01-09 PROCEDURE — 83036 HEMOGLOBIN GLYCOSYLATED A1C: CPT

## 2018-01-09 PROCEDURE — 85025 COMPLETE CBC W/AUTO DIFF WBC: CPT

## 2018-01-09 PROCEDURE — 36415 COLL VENOUS BLD VENIPUNCTURE: CPT

## 2018-01-09 PROCEDURE — 80053 COMPREHEN METABOLIC PANEL: CPT

## 2018-01-09 PROCEDURE — 97597 DBRDMT OPN WND 1ST 20 CM/<: CPT

## 2018-01-09 PROCEDURE — 99215 OFFICE O/P EST HI 40 MIN: CPT

## 2018-01-09 PROCEDURE — 84134 ASSAY OF PREALBUMIN: CPT

## 2018-01-09 NOTE — PROGRESS NOTES
beata Note Details  Patient Name: Flex Mendoza Date: 1/9/2018   Patient Number: [id number] Physician / Jenaro Thomas: Francoise Berg   Patient YOB: 1927 Facility: [facility]    Chief Complaint  This information was obtained from 1/9/18-Initial visit-89yo CM presents to clinic for right 4th toe wound evaluation and management. PMH of DM type 2 and neuropathy, CAD, HTN, HL, elevated liver enzymes and nocturnal leg cramps.  Pt stated that in November 2017 he was trimming toe nails and Celluitis  Prostate Cancer    Surgical History  This information was obtained from the patient, chart  Patient has a surgical history of:  Rotator Cuff Repair (2002 & 2005)  Cystoscopy urethral stricture  Circumcision (2016)  Coronary Artery Bypass Graft ( glipizide ER 2.5 mg tablet, extended release 24 hr oral 2 2 tablet extended release 24hr oral twice daily  simvastatin 10 mg tablet oral 1 1 tablet oral once daily  losartan 100 mg tablet oral 1 1 tablet oral once daily  furosemide 20 mg tablet oral tablet Wound #1 Right, Plantar Fourth Toe is a chronic Bear Grade 1 Diabetic Ulcer and has received a status of Not Healed.  Initial wound encounter measurements are 0.9cm length x 0.6cm width x 0.3cm depth, with an area of 0.54 sq cm and a volume of 0.162 cubic Erythema: Yes   Dependent Rubor: No   Hyperpigmentation: No   Lipodermatosclerosis: No      Additional Information  RIGHT- Heel to back of knee Measurement: 17 inches  LEFT- Heel to back of knee Measurement: 17 inches  Left Posterior Tibial Pulse: Biphasic Wound Cleansing & Dressings  May shower with protection. - Shower boot  Cleanse with saline or wound cleanser  Steroid cream/ointment - Rash area-betamethasone  Silver Foam  Medipore tape (no substitution)   Change dressing every: - Saturday    Compression

## 2018-01-15 ENCOUNTER — OFFICE VISIT (OUTPATIENT)
Dept: WOUND CARE | Facility: HOSPITAL | Age: 83
End: 2018-01-15
Attending: NURSE PRACTITIONER
Payer: MEDICARE

## 2018-01-15 ENCOUNTER — HOSPITAL ENCOUNTER (OUTPATIENT)
Dept: GENERAL RADIOLOGY | Facility: HOSPITAL | Age: 83
Discharge: HOME | End: 2018-01-15
Attending: PODIATRIST
Payer: MEDICARE

## 2018-01-15 DIAGNOSIS — E11.621 DIABETIC FOOT ULCER (HCC): ICD-10-CM

## 2018-01-15 DIAGNOSIS — L97.511 NON-PRESSURE CHRONIC ULCER OF OTHER PART OF RIGHT FOOT LIMITED TO BREAKDOWN OF SKIN (HCC): Primary | ICD-10-CM

## 2018-01-15 DIAGNOSIS — L97.311 NON-PRESSURE CHRONIC ULCER OF ANKLE, RIGHT, LIMITED TO BREAKDOWN OF SKIN (HCC): ICD-10-CM

## 2018-01-15 DIAGNOSIS — L97.509 DIABETIC FOOT ULCER (HCC): ICD-10-CM

## 2018-01-15 DIAGNOSIS — E11.621 TYPE 2 DIABETES MELLITUS WITH FOOT ULCER (HCC): ICD-10-CM

## 2018-01-15 DIAGNOSIS — L97.509 TYPE 2 DIABETES MELLITUS WITH FOOT ULCER (HCC): ICD-10-CM

## 2018-01-15 PROCEDURE — 73630 X-RAY EXAM OF FOOT: CPT | Performed by: PODIATRIST

## 2018-01-15 PROCEDURE — 99214 OFFICE O/P EST MOD 30 MIN: CPT

## 2018-01-16 ENCOUNTER — HOSPITAL ENCOUNTER (OUTPATIENT)
Dept: GENERAL RADIOLOGY | Facility: HOSPITAL | Age: 83
Discharge: HOME | End: 2018-01-16
Attending: RADIOLOGY
Payer: MEDICARE

## 2018-01-16 DIAGNOSIS — L97.519: ICD-10-CM

## 2018-01-16 PROCEDURE — 73660 X-RAY EXAM OF TOE(S): CPT | Performed by: RADIOLOGY

## 2018-01-22 ENCOUNTER — OFFICE VISIT (OUTPATIENT)
Dept: WOUND CARE | Facility: HOSPITAL | Age: 83
End: 2018-01-22
Attending: NURSE PRACTITIONER
Payer: MEDICARE

## 2018-01-22 DIAGNOSIS — L97.509 TYPE 2 DIABETES MELLITUS WITH FOOT ULCER (HCC): ICD-10-CM

## 2018-01-22 DIAGNOSIS — L97.511 NON-PRESSURE CHRONIC ULCER OF OTHER PART OF RIGHT FOOT LIMITED TO BREAKDOWN OF SKIN (HCC): ICD-10-CM

## 2018-01-22 DIAGNOSIS — E11.621 TYPE 2 DIABETES MELLITUS WITH FOOT ULCER (HCC): ICD-10-CM

## 2018-01-22 DIAGNOSIS — L97.311 NON-PRESSURE CHRONIC ULCER OF ANKLE, RIGHT, LIMITED TO BREAKDOWN OF SKIN (HCC): Primary | ICD-10-CM

## 2018-01-22 PROCEDURE — 99213 OFFICE O/P EST LOW 20 MIN: CPT

## 2018-01-25 ENCOUNTER — TELEPHONE (OUTPATIENT)
Dept: FAMILY MEDICINE CLINIC | Facility: CLINIC | Age: 83
End: 2018-01-25

## 2018-01-25 RX ORDER — MELATONIN
1000 DAILY
COMMUNITY
End: 2020-10-21 | Stop reason: ALTCHOICE

## 2018-01-25 NOTE — TELEPHONE ENCOUNTER
Pt is having a partial amputation of the right foot 4th toe on 2/2/18 with Dr. Alejandra Jain. They are only requesting an H&P within 30 days of exam.    Form placed in SMB Suite upcoming apt folder.

## 2018-01-29 ENCOUNTER — OFFICE VISIT (OUTPATIENT)
Dept: FAMILY MEDICINE CLINIC | Facility: CLINIC | Age: 83
End: 2018-01-29

## 2018-01-29 VITALS
DIASTOLIC BLOOD PRESSURE: 70 MMHG | HEIGHT: 68 IN | SYSTOLIC BLOOD PRESSURE: 128 MMHG | HEART RATE: 76 BPM | WEIGHT: 182 LBS | RESPIRATION RATE: 16 BRPM | TEMPERATURE: 98 F | BODY MASS INDEX: 27.58 KG/M2

## 2018-01-29 DIAGNOSIS — M86.171 OTHER ACUTE OSTEOMYELITIS OF RIGHT FOOT (HCC): ICD-10-CM

## 2018-01-29 DIAGNOSIS — Z01.818 PRE-OP EXAM: Primary | ICD-10-CM

## 2018-01-29 PROCEDURE — 93000 ELECTROCARDIOGRAM COMPLETE: CPT | Performed by: FAMILY MEDICINE

## 2018-01-29 PROCEDURE — 99214 OFFICE O/P EST MOD 30 MIN: CPT | Performed by: FAMILY MEDICINE

## 2018-01-29 RX ORDER — FUROSEMIDE 20 MG/1
40 TABLET ORAL 2 TIMES DAILY
Qty: 270 TABLET | Refills: 3 | Status: SHIPPED | OUTPATIENT
Start: 2018-01-29 | End: 2018-11-05

## 2018-01-29 RX ORDER — SIMVASTATIN 10 MG
10 TABLET ORAL DAILY
Qty: 90 TABLET | Refills: 3 | Status: SHIPPED | OUTPATIENT
Start: 2018-01-29 | End: 2019-01-21

## 2018-01-29 RX ORDER — GLIPIZIDE 2.5 MG/1
TABLET, EXTENDED RELEASE ORAL
Qty: 360 TABLET | Refills: 3 | Status: SHIPPED | OUTPATIENT
Start: 2018-01-29 | End: 2019-02-04

## 2018-01-29 NOTE — PROGRESS NOTES
This 61-year-old type II diabetic is scheduled for a right foot partial amputation of the fourth digit to be performed under local with sedation. Diagnosis is M 86.171. Surgery will be performed on February 2 of this year.     This gentleman lost his wife considerable. The assessment is medically cleared for upcoming partial amputation of the fourth digit of the right foot to be done under local with sedation.     We will follow him as an outpatient as soon as possible he is up-to-date on vaccines includi

## 2018-01-30 ENCOUNTER — TELEPHONE (OUTPATIENT)
Dept: FAMILY MEDICINE CLINIC | Facility: CLINIC | Age: 83
End: 2018-01-30

## 2018-01-30 NOTE — TELEPHONE ENCOUNTER
Dr. Jarred Newton office requesting EKG done yesterday. [facility] PSR informed and will fax. Fax: [phone]. They also need an addendum done after Thursday 2-2-18 10am that pt has no changes and still cleared for surgery and needs to be faxed to their office.  Fax

## 2018-01-31 ENCOUNTER — TELEPHONE (OUTPATIENT)
Dept: FAMILY MEDICINE CLINIC | Facility: CLINIC | Age: 83
End: 2018-01-31

## 2018-02-01 ENCOUNTER — TELEPHONE (OUTPATIENT)
Dept: FAMILY MEDICINE CLINIC | Facility: CLINIC | Age: 83
End: 2018-02-01

## 2018-02-02 ENCOUNTER — SURGERY (OUTPATIENT)
Age: 83
End: 2018-02-02

## 2018-02-02 ENCOUNTER — HOSPITAL ENCOUNTER (OUTPATIENT)
Facility: HOSPITAL | Age: 83
Setting detail: HOSPITAL OUTPATIENT SURGERY
Discharge: HOME | End: 2018-02-02
Attending: PODIATRIST | Admitting: PODIATRIST
Payer: MEDICARE

## 2018-02-02 ENCOUNTER — APPOINTMENT (OUTPATIENT)
Dept: GENERAL RADIOLOGY | Facility: HOSPITAL | Age: 83
End: 2018-02-02
Attending: PODIATRIST
Payer: MEDICARE

## 2018-02-02 ENCOUNTER — ANESTHESIA (OUTPATIENT)
Dept: SURGERY | Facility: HOSPITAL | Age: 83
End: 2018-02-02

## 2018-02-02 ENCOUNTER — ANESTHESIA EVENT (OUTPATIENT)
Dept: SURGERY | Facility: HOSPITAL | Age: 83
End: 2018-02-02

## 2018-02-02 VITALS
DIASTOLIC BLOOD PRESSURE: 76 MMHG | OXYGEN SATURATION: 100 % | HEIGHT: 68 IN | BODY MASS INDEX: 27.83 KG/M2 | TEMPERATURE: 97 F | SYSTOLIC BLOOD PRESSURE: 143 MMHG | WEIGHT: 183.63 LBS | HEART RATE: 85 BPM | RESPIRATION RATE: 18 BRPM

## 2018-02-02 LAB
GLUCOSE BLD-MCNC: 129 MG/DL (ref 65–99)
GLUCOSE BLD-MCNC: 146 MG/DL (ref 65–99)

## 2018-02-02 PROCEDURE — 87076 CULTURE ANAEROBE IDENT EACH: CPT | Performed by: PODIATRIST

## 2018-02-02 PROCEDURE — 82962 GLUCOSE BLOOD TEST: CPT

## 2018-02-02 PROCEDURE — 87075 CULTR BACTERIA EXCEPT BLOOD: CPT | Performed by: PODIATRIST

## 2018-02-02 PROCEDURE — 87176 TISSUE HOMOGENIZATION CULTR: CPT | Performed by: PODIATRIST

## 2018-02-02 PROCEDURE — 88311 DECALCIFY TISSUE: CPT | Performed by: PODIATRIST

## 2018-02-02 PROCEDURE — 88305 TISSUE EXAM BY PATHOLOGIST: CPT | Performed by: PODIATRIST

## 2018-02-02 PROCEDURE — 87147 CULTURE TYPE IMMUNOLOGIC: CPT | Performed by: PODIATRIST

## 2018-02-02 PROCEDURE — 87205 SMEAR GRAM STAIN: CPT | Performed by: PODIATRIST

## 2018-02-02 PROCEDURE — 0Y6V0Z3 DETACHMENT AT RIGHT 4TH TOE, LOW, OPEN APPROACH: ICD-10-PCS | Performed by: PODIATRIST

## 2018-02-02 PROCEDURE — 87070 CULTURE OTHR SPECIMN AEROBIC: CPT | Performed by: PODIATRIST

## 2018-02-02 PROCEDURE — 76000 FLUOROSCOPY <1 HR PHYS/QHP: CPT | Performed by: PODIATRIST

## 2018-02-02 RX ORDER — DEXTROSE MONOHYDRATE 25 G/50ML
50 INJECTION, SOLUTION INTRAVENOUS
Status: DISCONTINUED | OUTPATIENT
Start: 2018-02-02 | End: 2018-02-02 | Stop reason: HOSPADM

## 2018-02-02 RX ORDER — CLINDAMYCIN PHOSPHATE 900 MG/50ML
900 INJECTION INTRAVENOUS ONCE
Status: DISCONTINUED | OUTPATIENT
Start: 2018-02-02 | End: 2018-02-02 | Stop reason: HOSPADM

## 2018-02-02 RX ORDER — ONDANSETRON 2 MG/ML
4 INJECTION INTRAMUSCULAR; INTRAVENOUS AS NEEDED
Status: DISCONTINUED | OUTPATIENT
Start: 2018-02-02 | End: 2018-02-02

## 2018-02-02 RX ORDER — DEXAMETHASONE SODIUM PHOSPHATE 4 MG/ML
4 VIAL (ML) INJECTION AS NEEDED
Status: DISCONTINUED | OUTPATIENT
Start: 2018-02-02 | End: 2018-02-02

## 2018-02-02 RX ORDER — DEXTROSE MONOHYDRATE 25 G/50ML
50 INJECTION, SOLUTION INTRAVENOUS
Status: DISCONTINUED | OUTPATIENT
Start: 2018-02-02 | End: 2018-02-02

## 2018-02-02 RX ORDER — BUPIVACAINE HYDROCHLORIDE 5 MG/ML
INJECTION, SOLUTION EPIDURAL; INTRACAUDAL AS NEEDED
Status: DISCONTINUED | OUTPATIENT
Start: 2018-02-02 | End: 2018-02-02 | Stop reason: HOSPADM

## 2018-02-02 RX ORDER — BACITRACIN 50000 [USP'U]/1
INJECTION, POWDER, LYOPHILIZED, FOR SOLUTION INTRAMUSCULAR AS NEEDED
Status: DISCONTINUED | OUTPATIENT
Start: 2018-02-02 | End: 2018-02-02 | Stop reason: HOSPADM

## 2018-02-02 RX ORDER — SODIUM CHLORIDE, SODIUM LACTATE, POTASSIUM CHLORIDE, CALCIUM CHLORIDE 600; 310; 30; 20 MG/100ML; MG/100ML; MG/100ML; MG/100ML
INJECTION, SOLUTION INTRAVENOUS CONTINUOUS
Status: DISCONTINUED | OUTPATIENT
Start: 2018-02-02 | End: 2018-02-02

## 2018-02-02 RX ORDER — HYDROCODONE BITARTRATE AND ACETAMINOPHEN 5; 325 MG/1; MG/1
2 TABLET ORAL AS NEEDED
Status: DISCONTINUED | OUTPATIENT
Start: 2018-02-02 | End: 2018-02-02

## 2018-02-02 RX ORDER — MORPHINE SULFATE 4 MG/ML
2 INJECTION, SOLUTION INTRAMUSCULAR; INTRAVENOUS EVERY 5 MIN PRN
Status: DISCONTINUED | OUTPATIENT
Start: 2018-02-02 | End: 2018-02-02

## 2018-02-02 RX ORDER — HYDROCODONE BITARTRATE AND ACETAMINOPHEN 5; 325 MG/1; MG/1
1 TABLET ORAL AS NEEDED
Status: DISCONTINUED | OUTPATIENT
Start: 2018-02-02 | End: 2018-02-02

## 2018-02-02 RX ORDER — NALOXONE HYDROCHLORIDE 0.4 MG/ML
80 INJECTION, SOLUTION INTRAMUSCULAR; INTRAVENOUS; SUBCUTANEOUS AS NEEDED
Status: DISCONTINUED | OUTPATIENT
Start: 2018-02-02 | End: 2018-02-02

## 2018-02-02 NOTE — ANESTHESIA POSTPROCEDURE EVALUATION
[address] [facility]. Patient Status:  Hospital Outpatient Surgery   Age/Gender 80year old male MRN [id number]   [facility] SURGERY Attending Adriana Wall, [address] [facility] [address] Day # 0 PCP Samuel Mcmanus DO       Anesthesia Post-op N

## 2018-02-02 NOTE — ANESTHESIA PREPROCEDURE EVALUATION
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         Body mass i contractility. 3.  Normal ejection fraction of 68%. 4.  Normal EKG response to a very low exercise capacity. 5.  Very poor fitness capacity. 6. In comparison to prior study dated 02/04/2013, no significant changes have occurred. ECG reviewed. HGB 15.7 01/09/2018   HCT 46.2 01/09/2018   MCV 87.7 01/09/2018   MCH 29.8 01/09/2018   MCHC 34.0 01/09/2018   RDW 11.8 01/09/2018   .0 01/09/2018       Lab Results  Component Value Date    01/09/2018   K 4.0 01/09/2018    01/09/2018

## 2018-02-02 NOTE — BRIEF OP NOTE
Pre-Operative Diagnosis: Hammertoe the fourth digit right foot with distal tip keratoma and osteomyelitis of the distal phalanx right foot     Post-Operative Diagnosis: Same     Procedure Performed:   Procedure(s):  PARTIAL AMPUTATION  RIGHT FOURTH TOE

## 2018-02-03 ENCOUNTER — TELEPHONE (OUTPATIENT)
Dept: FAMILY MEDICINE CLINIC | Facility: CLINIC | Age: 83
End: 2018-02-03

## 2018-02-03 NOTE — TELEPHONE ENCOUNTER
[place] request for office notes for testing supplies, faxed note from Kamar Ellison 25 . Confirmation received. Task complete.  Fax [phone]

## 2018-02-13 NOTE — OPERATIVE REPORT
[address]    PATIENT'S NAME: Amara Gee   ATTENDING PHYSICIAN: Jessi Do D.P.M. OPERATING PHYSICIAN: Jessi Do D.P.M.    PATIENT ACCOUNT#:   [de-identified]    LOCATION:  [address] [place] [address] 19 [address] 10  MEDICAL RECORD #:   [id number] concerns recorded. Intravenous sedation was administered and the fourth toe was anesthetized using the above-mentioned mixture technique and amount of local anesthetic agent.   The right foot was then prepped and draped using the usual aseptic technique an

## 2018-03-12 RX ORDER — OMEPRAZOLE 20 MG/1
CAPSULE, DELAYED RELEASE ORAL
Qty: 90 CAPSULE | Refills: 2 | Status: SHIPPED | OUTPATIENT
Start: 2018-03-12 | End: 2018-06-07 | Stop reason: ALTCHOICE

## 2018-03-12 RX ORDER — LOSARTAN POTASSIUM 100 MG/1
TABLET ORAL
Qty: 90 TABLET | Refills: 0 | Status: SHIPPED | OUTPATIENT
Start: 2018-03-12 | End: 2018-06-11

## 2018-05-10 ENCOUNTER — OFFICE VISIT (OUTPATIENT)
Dept: FAMILY MEDICINE CLINIC | Facility: CLINIC | Age: 83
End: 2018-05-10

## 2018-05-10 VITALS
TEMPERATURE: 98 F | SYSTOLIC BLOOD PRESSURE: 130 MMHG | BODY MASS INDEX: 27.89 KG/M2 | RESPIRATION RATE: 16 BRPM | WEIGHT: 184 LBS | HEIGHT: 68 IN | DIASTOLIC BLOOD PRESSURE: 68 MMHG | HEART RATE: 64 BPM

## 2018-05-10 DIAGNOSIS — N40.1 BPH WITH URINARY OBSTRUCTION: Primary | ICD-10-CM

## 2018-05-10 DIAGNOSIS — N13.8 BPH WITH URINARY OBSTRUCTION: Primary | ICD-10-CM

## 2018-05-10 PROCEDURE — 99213 OFFICE O/P EST LOW 20 MIN: CPT | Performed by: FAMILY MEDICINE

## 2018-05-10 NOTE — PROGRESS NOTES
Ongoing urinary frequency without dysuria. He describes this as nocturia. He has been on oxybutynin 10 mg extended release daily for at least the past year with very mild or minimal improvement.   Today's exam is unremarkable he is a reasonably controlled

## 2018-05-16 ENCOUNTER — TELEPHONE (OUTPATIENT)
Dept: FAMILY MEDICINE CLINIC | Facility: CLINIC | Age: 83
End: 2018-05-16

## 2018-05-16 NOTE — TELEPHONE ENCOUNTER
Had to take antacid last night for acid coming up. Top number on blood pressure is over 200 and pulse is in the 80's,  Can the new med be causing that?

## 2018-05-16 NOTE — TELEPHONE ENCOUNTER
Spoke with patient, no history of GERD per pt, pt reports \"acid coming up my throat\". Pt took antiacid both last night and this morning. Last reading 215/98 P80 BP monitor upper arm, but pt has not used this monitor recently.    Pt c/o discomfort,\"dull\

## 2018-06-07 ENCOUNTER — OFFICE VISIT (OUTPATIENT)
Dept: FAMILY MEDICINE CLINIC | Facility: CLINIC | Age: 83
End: 2018-06-07

## 2018-06-07 VITALS
TEMPERATURE: 98 F | WEIGHT: 184 LBS | OXYGEN SATURATION: 98 % | RESPIRATION RATE: 18 BRPM | HEART RATE: 72 BPM | HEIGHT: 68 IN | DIASTOLIC BLOOD PRESSURE: 70 MMHG | BODY MASS INDEX: 27.89 KG/M2 | SYSTOLIC BLOOD PRESSURE: 120 MMHG

## 2018-06-07 DIAGNOSIS — E11.9 TYPE 2 DIABETES MELLITUS WITHOUT COMPLICATION, WITHOUT LONG-TERM CURRENT USE OF INSULIN (HCC): Primary | ICD-10-CM

## 2018-06-07 PROCEDURE — 99213 OFFICE O/P EST LOW 20 MIN: CPT | Performed by: FAMILY MEDICINE

## 2018-06-07 NOTE — PROGRESS NOTES
Type II diabetic here for general exam and looks address several issues of chronic irritation of his back with itching. Exam today revealed numerous benign-appearing minimally inflamed seborrheic keratosis.     The use of myrbetriq has improved his frequen

## 2018-06-11 RX ORDER — LOSARTAN POTASSIUM 100 MG/1
TABLET ORAL
Qty: 90 TABLET | Refills: 0 | Status: SHIPPED | OUTPATIENT
Start: 2018-06-11 | End: 2018-07-09

## 2018-06-12 RX ORDER — LOSARTAN POTASSIUM 100 MG/1
TABLET ORAL
Qty: 90 TABLET | Refills: 0 | Status: SHIPPED | OUTPATIENT
Start: 2018-06-12 | End: 2018-09-11

## 2018-06-18 ENCOUNTER — HOSPITAL ENCOUNTER (OUTPATIENT)
Age: 83
Discharge: HOME | End: 2018-06-18
Payer: MEDICARE

## 2018-06-18 ENCOUNTER — APPOINTMENT (OUTPATIENT)
Dept: GENERAL RADIOLOGY | Age: 83
End: 2018-06-18
Attending: PHYSICIAN ASSISTANT
Payer: MEDICARE

## 2018-06-18 VITALS
OXYGEN SATURATION: 96 % | DIASTOLIC BLOOD PRESSURE: 64 MMHG | TEMPERATURE: 97 F | BODY MASS INDEX: 27.28 KG/M2 | HEART RATE: 78 BPM | SYSTOLIC BLOOD PRESSURE: 128 MMHG | WEIGHT: 180 LBS | HEIGHT: 68 IN | RESPIRATION RATE: 18 BRPM

## 2018-06-18 DIAGNOSIS — S60.212A CONTUSION OF LEFT WRIST, INITIAL ENCOUNTER: Primary | ICD-10-CM

## 2018-06-18 PROCEDURE — 99203 OFFICE O/P NEW LOW 30 MIN: CPT

## 2018-06-18 PROCEDURE — 73110 X-RAY EXAM OF WRIST: CPT | Performed by: PHYSICIAN ASSISTANT

## 2018-06-18 PROCEDURE — 99213 OFFICE O/P EST LOW 20 MIN: CPT

## 2018-06-18 NOTE — ED INITIAL ASSESSMENT (HPI)
Patient reports he was trimming a tree about a week ago, and banged his right wrist.  Patient complains of pain to the left wrist.

## 2018-06-18 NOTE — ED PROVIDER NOTES
Patient Seen in: [facility]    History   Patient presents with:  Wrist Pain    Stated Complaint: left wrist pain x 1 week    HPI    Patient is a very pleasant 79-year-old male. He is right-hand dominant.   Patient arrives for ev Surgeon:                Erika Thomason MD;  Location: [address] [place] [address],[address]  5/1/2017: COLONOSCOPY N/A      Comment: Procedure: COLONOSCOPY;  Surgeon: Raisa Segura MD;  Location: [facility] ENDOSCOPY  2/25/2016: Jerome Verduczo range of motion, no thyromegaly or lymphadenopathy. Eye examination: EOMs are intact, normal conjunctival  ENT: Atraumatic  Lung: No distress, RR, no retraction,   Extremities: Some inflammation to the left distal radius extending into the snuffbox.   Angela

## 2018-06-19 ENCOUNTER — TELEPHONE (OUTPATIENT)
Dept: FAMILY MEDICINE CLINIC | Facility: CLINIC | Age: 83
End: 2018-06-19

## 2018-06-19 NOTE — TELEPHONE ENCOUNTER
Patient was seen at [address] [place] [address] yesterday for his wrist.  He is confused on if he needs a follow up appointment with Dr Venita Hidalgo. He already has an appt for August for a regular 2 mos follow up which I told him was not in regards to his wrist.      Please advise.

## 2018-06-19 NOTE — TELEPHONE ENCOUNTER
See ED MD notes, advised pt on these recommendations. Stressed to pt all treatment and to monitor closely and to call right away if symptoms worsen or after a couple of weeks if symptoms persists. Pt expressed understanding and agreement. Task completed.

## 2018-07-09 ENCOUNTER — HOSPITAL ENCOUNTER (OUTPATIENT)
Dept: GENERAL RADIOLOGY | Age: 83
Discharge: HOME | End: 2018-07-09
Attending: FAMILY MEDICINE
Payer: MEDICARE

## 2018-07-09 ENCOUNTER — OFFICE VISIT (OUTPATIENT)
Dept: FAMILY MEDICINE CLINIC | Facility: CLINIC | Age: 83
End: 2018-07-09

## 2018-07-09 VITALS
TEMPERATURE: 98 F | DIASTOLIC BLOOD PRESSURE: 68 MMHG | RESPIRATION RATE: 16 BRPM | OXYGEN SATURATION: 97 % | HEART RATE: 78 BPM | SYSTOLIC BLOOD PRESSURE: 130 MMHG | BODY MASS INDEX: 28 KG/M2 | WEIGHT: 183 LBS

## 2018-07-09 DIAGNOSIS — M25.532 LEFT WRIST PAIN: Primary | ICD-10-CM

## 2018-07-09 DIAGNOSIS — M25.532 LEFT WRIST PAIN: ICD-10-CM

## 2018-07-09 DIAGNOSIS — S62.002A OCCULT CLOSED FRACTURE OF SCAPHOID OF LEFT WRIST, INITIAL ENCOUNTER: ICD-10-CM

## 2018-07-09 PROCEDURE — 99213 OFFICE O/P EST LOW 20 MIN: CPT | Performed by: FAMILY MEDICINE

## 2018-07-09 PROCEDURE — 73110 X-RAY EXAM OF WRIST: CPT | Performed by: FAMILY MEDICINE

## 2018-07-09 NOTE — PROGRESS NOTES
Almost 1 month ago this gentleman accidentally fell onto his left wrist hyperextending it.   He was seen in the ER where x-ray showed marked osteoarthritic changes they placed him on a left wrist splint with thumb spica and has been making slow improvement

## 2018-07-16 ENCOUNTER — TELEPHONE (OUTPATIENT)
Dept: FAMILY MEDICINE CLINIC | Facility: CLINIC | Age: 83
End: 2018-07-16

## 2018-07-16 ENCOUNTER — APPOINTMENT (OUTPATIENT)
Dept: OCCUPATIONAL MEDICINE | Facility: HOSPITAL | Age: 83
End: 2018-07-16
Attending: FAMILY MEDICINE
Payer: MEDICARE

## 2018-07-16 DIAGNOSIS — S62.002A OCCULT CLOSED FRACTURE OF SCAPHOID OF LEFT WRIST, INITIAL ENCOUNTER: Primary | ICD-10-CM

## 2018-07-16 NOTE — TELEPHONE ENCOUNTER
Yehuda Bonner from pt called and needs order/ referral for wrist changed from pt to occupational therapy  Pt does not do wrists  Please call if any questions

## 2018-07-17 ENCOUNTER — HOSPITAL ENCOUNTER (OUTPATIENT)
Dept: OCCUPATIONAL MEDICINE | Facility: HOSPITAL | Age: 83
Setting detail: THERAPIES SERIES
Discharge: HOME | End: 2018-07-17
Attending: FAMILY MEDICINE
Payer: MEDICARE

## 2018-07-17 PROCEDURE — 97110 THERAPEUTIC EXERCISES: CPT

## 2018-07-17 PROCEDURE — 97165 OT EVAL LOW COMPLEX 30 MIN: CPT

## 2018-07-17 NOTE — PROGRESS NOTES
OCCUPATIONAL THERAPY UPPER EXTREMITY EVALUATION   Referring Physician: Dr. Rebeca Cheadle  Diagnosis: Occult closed fracture of scaphoid of left wrist, initial encounter (V47.173V)    Date of Service: 7/17/2018     PATIENT SUMMARY   Alejandramain Green is a 80 year o R=64 , L=62   Ulnar Deviation: R=35 , L=25   Radial Deviation R =15, L=12      AROM/PROM:(Degrees)  WFL bilateral fingers  THUMBS: MP: R=0-58   L= 0-60                    IP :      0-68        0-54  Full opposition bilaterally    MANUAL MUSCLE TESTING: R= [phone]    Sincerely,  Electronically signed by therapist: ELIZABETH Fernández/L   CLT    Physician's certification required: Yes  I certify the need for these services furnished under this plan of treatment and while under my care.     X____________

## 2018-07-23 ENCOUNTER — HOSPITAL ENCOUNTER (OUTPATIENT)
Dept: OCCUPATIONAL MEDICINE | Facility: HOSPITAL | Age: 83
Setting detail: THERAPIES SERIES
Discharge: HOME | End: 2018-07-23
Attending: FAMILY MEDICINE
Payer: MEDICARE

## 2018-07-23 PROCEDURE — 97110 THERAPEUTIC EXERCISES: CPT

## 2018-07-23 PROCEDURE — 97140 MANUAL THERAPY 1/> REGIONS: CPT

## 2018-07-23 NOTE — PROGRESS NOTES
Dx:  Occult closed fracture of scaphoid of left wrist, initial encounter (G70.515E)       Authorized # of Visits:  2/12         Next MD visit: none scheduled  Fall Risk: standard         Precautions: n/a             Subjective:   Pt reports:  * L hand is g

## 2018-07-26 RX ORDER — LANCETS
EACH MISCELLANEOUS
Qty: 100 EACH | Refills: 2 | Status: SHIPPED | OUTPATIENT
Start: 2018-07-26 | End: 2018-07-27

## 2018-07-26 RX ORDER — LANCETS
EACH MISCELLANEOUS
Refills: 2 | OUTPATIENT
Start: 2018-07-26

## 2018-07-27 ENCOUNTER — TELEPHONE (OUTPATIENT)
Dept: FAMILY MEDICINE CLINIC | Facility: CLINIC | Age: 83
End: 2018-07-27

## 2018-07-27 RX ORDER — LANCETS
EACH MISCELLANEOUS
Qty: 100 EACH | Refills: 2 | Status: SHIPPED | OUTPATIENT
Start: 2018-07-27 | End: 2018-12-27

## 2018-07-27 NOTE — TELEPHONE ENCOUNTER
[facility]    WE SENT OVER  STRIPS AND LANCET ORDER. NEEDS DX CODE SENT TO THEM AGAIN. HAS TO CONTAIN DX CODE E11.59  AND NIDDM.

## 2018-07-30 ENCOUNTER — HOSPITAL ENCOUNTER (OUTPATIENT)
Dept: OCCUPATIONAL MEDICINE | Facility: HOSPITAL | Age: 83
Setting detail: THERAPIES SERIES
Discharge: HOME | End: 2018-07-30
Attending: FAMILY MEDICINE
Payer: MEDICARE

## 2018-07-30 PROCEDURE — 97140 MANUAL THERAPY 1/> REGIONS: CPT

## 2018-07-30 PROCEDURE — 97110 THERAPEUTIC EXERCISES: CPT

## 2018-07-30 NOTE — PROGRESS NOTES
Dx:  Occult closed fracture of scaphoid of left wrist, initial encounter (S62.002A)       Authorized # of Visits:  3/12         Next MD visit: none scheduled  Fall Risk: standard         Precautions: n/a             Subjective:   Pt reports:  * L hand is g Tip/opposition pinch thin metal pegs L hand        yel putty  and pinch L  yel puttycize lrg knob  L  yel putty  and pinch L  yel puttycize lrg knob  L          yel theraband stretching bilat hand                           Skilled Services: manual

## 2018-07-31 ENCOUNTER — TELEPHONE (OUTPATIENT)
Dept: FAMILY MEDICINE CLINIC | Facility: CLINIC | Age: 83
End: 2018-07-31

## 2018-08-07 ENCOUNTER — OFFICE VISIT (OUTPATIENT)
Dept: FAMILY MEDICINE CLINIC | Facility: CLINIC | Age: 83
End: 2018-08-07
Payer: MEDICARE

## 2018-08-07 VITALS
HEART RATE: 66 BPM | BODY MASS INDEX: 27.58 KG/M2 | WEIGHT: 182 LBS | HEIGHT: 68 IN | RESPIRATION RATE: 16 BRPM | OXYGEN SATURATION: 93 % | DIASTOLIC BLOOD PRESSURE: 62 MMHG | SYSTOLIC BLOOD PRESSURE: 110 MMHG | TEMPERATURE: 98 F

## 2018-08-07 DIAGNOSIS — S62.002G: Primary | ICD-10-CM

## 2018-08-07 PROCEDURE — 99213 OFFICE O/P EST LOW 20 MIN: CPT | Performed by: FAMILY MEDICINE

## 2018-08-07 NOTE — PROGRESS NOTES
Follow-up for his injured left wrist.  He is now in therapy. Wears a cockup wrist splint at night.   Was seen by Nash Krabbe reviewed no fracture no dislocation positive arthritic changes however are noted    Exam slight residual pain in the anatomic sn

## 2018-08-10 ENCOUNTER — HOSPITAL ENCOUNTER (OUTPATIENT)
Dept: OCCUPATIONAL MEDICINE | Facility: HOSPITAL | Age: 83
Setting detail: THERAPIES SERIES
Discharge: HOME | End: 2018-08-10
Attending: FAMILY MEDICINE
Payer: MEDICARE

## 2018-08-10 PROCEDURE — 97140 MANUAL THERAPY 1/> REGIONS: CPT

## 2018-08-10 PROCEDURE — 97110 THERAPEUTIC EXERCISES: CPT

## 2018-08-10 NOTE — PROGRESS NOTES
Dx:  Occult closed fracture of scaphoid of left wrist, initial encounter (S62.002A)       Authorized # of Visits:  4/12         Next MD visit: none scheduled  Fall Risk: standard         Precautions: n/a            Discharge Summary    Pt has attended 4, c increase L thumb IP jnt  To 65 degrees flx to ease sustained tip pinch  8 visits. MET  4- increase L pinch strengths to equal R to ease safe tip pinch 8 visits.   MET     Pt will be independent and compliant with comprehensive HEP to maintain progress

## 2018-08-17 ENCOUNTER — HOSPITAL ENCOUNTER (OUTPATIENT)
Age: 83
Discharge: HOME | End: 2018-08-17
Attending: FAMILY MEDICINE
Payer: MEDICARE

## 2018-08-17 ENCOUNTER — APPOINTMENT (OUTPATIENT)
Dept: GENERAL RADIOLOGY | Age: 83
End: 2018-08-17
Attending: FAMILY MEDICINE
Payer: MEDICARE

## 2018-08-17 VITALS
HEIGHT: 68 IN | TEMPERATURE: 98 F | OXYGEN SATURATION: 97 % | RESPIRATION RATE: 20 BRPM | WEIGHT: 180 LBS | SYSTOLIC BLOOD PRESSURE: 125 MMHG | DIASTOLIC BLOOD PRESSURE: 65 MMHG | BODY MASS INDEX: 27.28 KG/M2 | HEART RATE: 71 BPM

## 2018-08-17 DIAGNOSIS — M54.30 ACUTE SCIATICA: Primary | ICD-10-CM

## 2018-08-17 DIAGNOSIS — R07.0 BURNING SENSATION OF THROAT: ICD-10-CM

## 2018-08-17 LAB
ATRIAL RATE: 86 BPM
P AXIS: 18 DEGREES
P-R INTERVAL: 138 MS
POCT BILIRUBIN URINE: NEGATIVE
POCT BLOOD URINE: NEGATIVE
POCT GLUCOSE URINE: NEGATIVE MG/DL
POCT KETONE URINE: NEGATIVE MG/DL
POCT LEUKOCYTE ESTERASE URINE: NEGATIVE
POCT NITRITE URINE: NEGATIVE
POCT PH URINE: 6 (ref 5–8)
POCT PROTEIN URINE: NEGATIVE MG/DL
POCT SPECIFIC GRAVITY URINE: 1.02
POCT URINE COLOR: YELLOW
POCT UROBILINOGEN URINE: 0.2 MG/DL
Q-T INTERVAL: 426 MS
QRS DURATION: 138 MS
QTC CALCULATION (BEZET): 509 MS
R AXIS: -5 DEGREES
T AXIS: 9 DEGREES
VENTRICULAR RATE: 86 BPM

## 2018-08-17 PROCEDURE — 93010 ELECTROCARDIOGRAM REPORT: CPT

## 2018-08-17 PROCEDURE — 99215 OFFICE O/P EST HI 40 MIN: CPT

## 2018-08-17 PROCEDURE — 72110 X-RAY EXAM L-2 SPINE 4/>VWS: CPT | Performed by: FAMILY MEDICINE

## 2018-08-17 PROCEDURE — 93005 ELECTROCARDIOGRAM TRACING: CPT

## 2018-08-17 PROCEDURE — 81002 URINALYSIS NONAUTO W/O SCOPE: CPT | Performed by: FAMILY MEDICINE

## 2018-08-17 PROCEDURE — 99214 OFFICE O/P EST MOD 30 MIN: CPT

## 2018-08-17 RX ORDER — OMEPRAZOLE 40 MG/1
40 CAPSULE, DELAYED RELEASE ORAL DAILY
Qty: 14 CAPSULE | Refills: 0 | Status: SHIPPED | OUTPATIENT
Start: 2018-08-17 | End: 2018-08-31

## 2018-08-17 RX ORDER — METHYLPREDNISOLONE 4 MG/1
TABLET ORAL
Qty: 1 PACKAGE | Refills: 0 | Status: SHIPPED | OUTPATIENT
Start: 2018-08-17 | End: 2018-08-24 | Stop reason: ALTCHOICE

## 2018-08-17 NOTE — ED PROVIDER NOTES
Patient Seen in: [facility] END    History   Patient presents with:  Leg Pain    Stated Complaint: 2-3 days stiffness in legs no injury    HPI    35-year-old male presents today with chief complaint of right leg pain that started 2 • Urinary frequency        Past Surgical History:  age 8: ANESTHESIA FOR EAR SURGERY  01/01/2007: CABG      Comment: CABG x4  No date: CATARACT Bilateral  2/25/2016: CIRCUMCISION,OTHR N/A      Comment: Procedure: CIRCUMCISION ADULT;  Surgeon: air)    Current:/65   Pulse 71   Temp 97.9 °F (36.6 °C) (Temporal)   Resp 20   Ht 172.7 cm (5' 8\")   Wt 81.6 kg   SpO2 97%   BMI 27.37 kg/m²         Physical Exam   Constitutional: He is oriented to person, place, and time.  He appears well-developed spine x-ray shows degenerative lumbar disc disease patient is diabetic last A1c 6.6 will treat with Medrol Dosepak to decrease inflammation. Discussed side effects of the medication. Monitor blood sugars at home.     Throat irritation, throat burning like

## 2018-08-17 NOTE — ED INITIAL ASSESSMENT (HPI)
Patient presents with right lower leg pain for last 2 days. Radiates to right buttocks. +palpable pulses.

## 2018-08-20 ENCOUNTER — APPOINTMENT (OUTPATIENT)
Dept: OCCUPATIONAL MEDICINE | Facility: HOSPITAL | Age: 83
End: 2018-08-20
Attending: FAMILY MEDICINE
Payer: MEDICARE

## 2018-08-23 ENCOUNTER — APPOINTMENT (OUTPATIENT)
Dept: ULTRASOUND IMAGING | Facility: HOSPITAL | Age: 83
End: 2018-08-23
Attending: EMERGENCY MEDICINE
Payer: MEDICARE

## 2018-08-23 ENCOUNTER — PRIOR ORIGINAL RECORDS (OUTPATIENT)
Dept: OTHER | Age: 83
End: 2018-08-23

## 2018-08-23 ENCOUNTER — HOSPITAL ENCOUNTER (EMERGENCY)
Facility: HOSPITAL | Age: 83
Discharge: HOME | End: 2018-08-23
Attending: EMERGENCY MEDICINE
Payer: MEDICARE

## 2018-08-23 VITALS
BODY MASS INDEX: 27.28 KG/M2 | DIASTOLIC BLOOD PRESSURE: 66 MMHG | OXYGEN SATURATION: 98 % | RESPIRATION RATE: 18 BRPM | SYSTOLIC BLOOD PRESSURE: 165 MMHG | HEIGHT: 68 IN | WEIGHT: 180 LBS | TEMPERATURE: 98 F | HEART RATE: 64 BPM

## 2018-08-23 DIAGNOSIS — M54.40 BACK PAIN OF LUMBAR REGION WITH SCIATICA: Primary | ICD-10-CM

## 2018-08-23 LAB
ALBUMIN SERPL-MCNC: 3.9 G/DL (ref 3.5–4.8)
ALBUMIN/GLOB SERPL: 1.1 {RATIO} (ref 1–2)
ALP LIVER SERPL-CCNC: 132 U/L (ref 45–117)
ALT SERPL-CCNC: 86 U/L (ref 17–63)
ANION GAP SERPL CALC-SCNC: 10 MMOL/L (ref 0–18)
AST SERPL-CCNC: 44 U/L (ref 15–41)
BASOPHILS # BLD AUTO: 0.04 X10(3) UL (ref 0–0.1)
BASOPHILS NFR BLD AUTO: 0.3 %
BILIRUB SERPL-MCNC: 1.2 MG/DL (ref 0.1–2)
BUN BLD-MCNC: 26 MG/DL (ref 8–20)
BUN/CREAT SERPL: 26.5 (ref 10–20)
CALCIUM BLD-MCNC: 8.5 MG/DL (ref 8.3–10.3)
CHLORIDE SERPL-SCNC: 101 MMOL/L (ref 101–111)
CO2 SERPL-SCNC: 25 MMOL/L (ref 22–32)
CREAT BLD-MCNC: 0.98 MG/DL (ref 0.7–1.3)
EOSINOPHIL # BLD AUTO: 0.11 X10(3) UL (ref 0–0.3)
EOSINOPHIL NFR BLD AUTO: 1 %
ERYTHROCYTE [DISTWIDTH] IN BLOOD BY AUTOMATED COUNT: 11.9 % (ref 11.5–16)
GLOBULIN PLAS-MCNC: 3.5 G/DL (ref 2.5–4)
GLUCOSE BLD-MCNC: 159 MG/DL (ref 70–99)
HCT VFR BLD AUTO: 48.8 % (ref 37–53)
HGB BLD-MCNC: 17 G/DL (ref 13–17)
IMMATURE GRANULOCYTE COUNT: 0.12 X10(3) UL (ref 0–1)
IMMATURE GRANULOCYTE RATIO %: 1 %
LYMPHOCYTES # BLD AUTO: 1.91 X10(3) UL (ref 0.9–4)
LYMPHOCYTES NFR BLD AUTO: 16.6 %
M PROTEIN MFR SERPL ELPH: 7.4 G/DL (ref 6.1–8.3)
MCH RBC QN AUTO: 30.1 PG (ref 27–33.2)
MCHC RBC AUTO-ENTMCNC: 34.8 G/DL (ref 31–37)
MCV RBC AUTO: 86.4 FL (ref 80–99)
MONOCYTES # BLD AUTO: 0.96 X10(3) UL (ref 0.1–1)
MONOCYTES NFR BLD AUTO: 8.4 %
NEUTROPHIL ABS PRELIM: 8.34 X10 (3) UL (ref 1.3–6.7)
NEUTROPHILS # BLD AUTO: 8.34 X10(3) UL (ref 1.3–6.7)
NEUTROPHILS NFR BLD AUTO: 72.7 %
OSMOLALITY SERPL CALC.SUM OF ELEC: 290 MOSM/KG (ref 275–295)
PLATELET # BLD AUTO: 255 10(3)UL (ref 150–450)
POTASSIUM SERPL-SCNC: 3.7 MMOL/L (ref 3.6–5.1)
RBC # BLD AUTO: 5.65 X10(6)UL (ref 3.8–5.8)
RED CELL DISTRIBUTION WIDTH-SD: 38.2 FL (ref 35.1–46.3)
SODIUM SERPL-SCNC: 136 MMOL/L (ref 136–144)
WBC # BLD AUTO: 11.5 X10(3) UL (ref 4–13)

## 2018-08-23 PROCEDURE — 93970 EXTREMITY STUDY: CPT | Performed by: EMERGENCY MEDICINE

## 2018-08-23 PROCEDURE — 85025 COMPLETE CBC W/AUTO DIFF WBC: CPT | Performed by: EMERGENCY MEDICINE

## 2018-08-23 PROCEDURE — 99285 EMERGENCY DEPT VISIT HI MDM: CPT

## 2018-08-23 PROCEDURE — 80053 COMPREHEN METABOLIC PANEL: CPT | Performed by: EMERGENCY MEDICINE

## 2018-08-23 PROCEDURE — 36415 COLL VENOUS BLD VENIPUNCTURE: CPT

## 2018-08-23 PROCEDURE — 99284 EMERGENCY DEPT VISIT MOD MDM: CPT

## 2018-08-23 NOTE — ED PROVIDER NOTES
Patient Seen in: [facility] Emergency Department    History   Patient presents with:  Lower Extremity Injury (musculoskeletal)    Stated Complaint: bilat leg pain x 1 week    HPI    Patient is a 58-year-old male presenting to the emergency departmen [facility])     prostate cancer -seed implants   • Coronary atherosclerosis of unspecified type of vessel, native or graft    • Dermatophytosis of foot    • Dermatophytosis of the body    • Diverticulitis of small intestine (without mention of hemorrhage) DILATION;                Surgeon: Willow Jamison MD;  Location: [address] [place] [address]  No date: REPAIR ROTATOR CUFF,ACUTE      Comment: 2002 left /2005 right         Smoking status: Former Smoker midline step-offs or tenderness throughout the cervical, thoracic or lumbar spine. Extremities: There is some active twitching of his right distal quadriceps close to the quadriceps tendon. There is some mild tenderness here.   Otherwise his extremities a visible. COMPRESSION:  Normal compressibility, phasicity, and augmentation. OTHER:  Negative.     =====  CONCLUSION:  No evidence of DVT in bilateral lower extremities. US Negative for DVT.   Believe the patient's symptoms are likely due to sc

## 2018-08-24 ENCOUNTER — OFFICE VISIT (OUTPATIENT)
Dept: FAMILY MEDICINE CLINIC | Facility: CLINIC | Age: 83
End: 2018-08-24
Payer: MEDICARE

## 2018-08-24 VITALS
HEART RATE: 76 BPM | OXYGEN SATURATION: 98 % | TEMPERATURE: 98 F | SYSTOLIC BLOOD PRESSURE: 126 MMHG | DIASTOLIC BLOOD PRESSURE: 78 MMHG | RESPIRATION RATE: 16 BRPM | BODY MASS INDEX: 27.13 KG/M2 | HEIGHT: 68 IN | WEIGHT: 179 LBS

## 2018-08-24 DIAGNOSIS — G47.62 LEG CRAMPS, SLEEP RELATED: Primary | ICD-10-CM

## 2018-08-24 PROCEDURE — 99213 OFFICE O/P EST LOW 20 MIN: CPT | Performed by: FAMILY MEDICINE

## 2018-08-24 NOTE — PROGRESS NOTES
This is a follow-up visit after he was evaluated in urgent care and worked up for several hours at [facility].  He woke up with his often occurring bilateral lower extremity muscle cramps very much like charley horses.   There is no chest pain or short

## 2018-08-27 ENCOUNTER — APPOINTMENT (OUTPATIENT)
Dept: OCCUPATIONAL MEDICINE | Facility: HOSPITAL | Age: 83
End: 2018-08-27
Attending: FAMILY MEDICINE
Payer: MEDICARE

## 2018-08-27 ENCOUNTER — MYAURORA ACCOUNT LINK (OUTPATIENT)
Dept: OTHER | Age: 83
End: 2018-08-27

## 2018-08-27 ENCOUNTER — PRIOR ORIGINAL RECORDS (OUTPATIENT)
Dept: OTHER | Age: 83
End: 2018-08-27

## 2018-08-28 ENCOUNTER — OFFICE VISIT (OUTPATIENT)
Dept: PODIATRY CLINIC | Facility: CLINIC | Age: 83
End: 2018-08-28
Payer: MEDICARE

## 2018-08-28 DIAGNOSIS — B35.1 ONYCHOMYCOSIS: ICD-10-CM

## 2018-08-28 DIAGNOSIS — L84 HELOMA DURUM: ICD-10-CM

## 2018-08-28 DIAGNOSIS — Z89.421 HISTORY OF PARTIAL AMPUTATION OF TOE OF RIGHT FOOT (HCC): ICD-10-CM

## 2018-08-28 DIAGNOSIS — M20.42 HAMMER TOES OF BOTH FEET: ICD-10-CM

## 2018-08-28 DIAGNOSIS — E11.42 DIABETIC POLYNEUROPATHY ASSOCIATED WITH TYPE 2 DIABETES MELLITUS (HCC): Primary | ICD-10-CM

## 2018-08-28 DIAGNOSIS — L84 CALLUS OF FOOT: ICD-10-CM

## 2018-08-28 DIAGNOSIS — M21.6X1 PLANTAR FLEXED METATARSAL BONE OF RIGHT FOOT: ICD-10-CM

## 2018-08-28 DIAGNOSIS — M21.6X2 PLANTAR FLEXED METATARSAL BONE OF LEFT FOOT: ICD-10-CM

## 2018-08-28 DIAGNOSIS — M20.41 HAMMER TOES OF BOTH FEET: ICD-10-CM

## 2018-08-28 PROCEDURE — 11721 DEBRIDE NAIL 6 OR MORE: CPT | Performed by: PODIATRIST

## 2018-08-28 PROCEDURE — 99213 OFFICE O/P EST LOW 20 MIN: CPT | Performed by: PODIATRIST

## 2018-08-30 NOTE — PROGRESS NOTES
Shahla Calero is a 80year old male. Patient presents with:  Diabetic Foot Care: Pt is here for diabetic foot assessment. Pt's blood sugar this am 129. Pt sees DR. Bessie Carbajal for diabetes. Last seen last week . Toenail Care: Last A1c level. does not rememb SILVER) Oral Tab Take 1 Tab by mouth daily. Disp:  Rfl:    Omega-3 Fatty Acids (FISH OIL) 1000 MG Oral Cap Take  by mouth. Take two capsules daily Disp:  Rfl:    Magnesium 100 MG Oral Tab Take 250 mg by mouth daily.  Take two tablets daily  Disp:  Rfl: [place]  2/25/2016: PATIENT DOCUMENTED NOT TO HAVE EXPERIENCED ANY* N/A      Comment: Procedure: CYSTOSCOPY WITH URETHRAL DILATION;                Surgeon: Fabio Vaz MD;  Location: [address] [place] [address]  2/25/2016: PATIENT WITH 1-3, 5 on the right are thickened and dystrophic.   2. Vascular: The patient has weakly palpable dorsalis pedis and posterior tibial pulses   3.  Neurologic: Patient cannot feel a [place]-Tio 10 g filament in 2 out of the 10 dermatomes bilateral feet

## 2018-08-31 ENCOUNTER — APPOINTMENT (OUTPATIENT)
Dept: GENERAL RADIOLOGY | Facility: HOSPITAL | Age: 83
End: 2018-08-31
Attending: EMERGENCY MEDICINE
Payer: MEDICARE

## 2018-08-31 ENCOUNTER — HOSPITAL ENCOUNTER (EMERGENCY)
Facility: HOSPITAL | Age: 83
Discharge: HOME | End: 2018-08-31
Attending: EMERGENCY MEDICINE
Payer: MEDICARE

## 2018-08-31 VITALS
SYSTOLIC BLOOD PRESSURE: 124 MMHG | TEMPERATURE: 98 F | OXYGEN SATURATION: 96 % | WEIGHT: 180 LBS | HEIGHT: 69 IN | DIASTOLIC BLOOD PRESSURE: 70 MMHG | HEART RATE: 71 BPM | BODY MASS INDEX: 26.66 KG/M2 | RESPIRATION RATE: 18 BRPM

## 2018-08-31 DIAGNOSIS — F41.9 ANXIETY: ICD-10-CM

## 2018-08-31 DIAGNOSIS — R06.00 DYSPNEA, UNSPECIFIED TYPE: Primary | ICD-10-CM

## 2018-08-31 DIAGNOSIS — E87.6 HYPOKALEMIA: ICD-10-CM

## 2018-08-31 PROCEDURE — 85025 COMPLETE CBC W/AUTO DIFF WBC: CPT | Performed by: EMERGENCY MEDICINE

## 2018-08-31 PROCEDURE — 93010 ELECTROCARDIOGRAM REPORT: CPT

## 2018-08-31 PROCEDURE — 71045 X-RAY EXAM CHEST 1 VIEW: CPT | Performed by: EMERGENCY MEDICINE

## 2018-08-31 PROCEDURE — 93005 ELECTROCARDIOGRAM TRACING: CPT

## 2018-08-31 PROCEDURE — 80053 COMPREHEN METABOLIC PANEL: CPT | Performed by: EMERGENCY MEDICINE

## 2018-08-31 PROCEDURE — 82962 GLUCOSE BLOOD TEST: CPT

## 2018-08-31 PROCEDURE — 99285 EMERGENCY DEPT VISIT HI MDM: CPT

## 2018-08-31 PROCEDURE — 96374 THER/PROPH/DIAG INJ IV PUSH: CPT

## 2018-08-31 PROCEDURE — 84484 ASSAY OF TROPONIN QUANT: CPT | Performed by: EMERGENCY MEDICINE

## 2018-08-31 RX ORDER — POTASSIUM CHLORIDE 20 MEQ/1
40 TABLET, EXTENDED RELEASE ORAL ONCE
Status: COMPLETED | OUTPATIENT
Start: 2018-08-31 | End: 2018-08-31

## 2018-08-31 RX ORDER — LORAZEPAM 2 MG/ML
0.5 INJECTION INTRAMUSCULAR ONCE
Status: COMPLETED | OUTPATIENT
Start: 2018-08-31 | End: 2018-08-31

## 2018-08-31 NOTE — ED PROVIDER NOTES
Patient Seen in: [facility] Emergency Department    History   Patient presents with:  Dyspnea VENKATA SOB (respiratory)    Stated Complaint: [address]     HPI    Patient is a 60-year-old male with multiple medical problems including coronary d CIRCUMCISION ADULT;  Surgeon:                Manda Sheridan MD;  Location: [address] [place] [address],[address]  5/1/2017: COLONOSCOPY N/A      Comment: Procedure: COLONOSCOPY;  Surgeon: Ricky Sanchez MD;  Location: [facility] ENDOSCOPY No meningismus. No adenopathy  Lungs: No tachypnea. Lungs clear to auscultation bilaterally without rales/rhonchi. Equal breath sounds bilaterally  Cardiac: No tachycardia. No murmurs. Regular rate and rhythm. Abdomen: Soft and nontender throughout. sternotomy    MDM     She presents with anxiety, mild dyspnea. Got 0.5 Ativan complete resolution of symptoms. He was observed. EKG is unchanged from baseline. Troponins negative. Potassium minimally low and was supplemented.   Patient observed for sev

## 2018-08-31 NOTE — ED NOTES
Patient is resting comfortably abed w/ no distress at this time, call light in reach, VSS. Will continue to monitor.

## 2018-08-31 NOTE — ED INITIAL ASSESSMENT (HPI)
Pt here for lightheadness, increased in breathing and increased BG and BP. Pt denies n,v,d or fever or recent cough or cold. Per medics and pt daughter pt has hx of anxiety.

## 2018-08-31 NOTE — ED NOTES
Pt placed on 2L 02 due to 02 sat dipping to 88% while pt sleeping. 02 saturation remains 95%. Will continue to monitor. No acute distress noted.

## 2018-09-06 LAB
ALBUMIN: 3.9 G/DL
ALKALINE PHOSPHATATE(ALK PHOS): 132 IU/L
BILIRUBIN TOTAL: 1.2 MG/DL
BUN: 26 MG/DL
CALCIUM: 8.5 MG/DL
CHLORIDE: 101 MEQ/L
CREATININE, SERUM: 0.98 MG/DL
GLOBULIN: 3.5 G/DL
GLUCOSE: 159 MG/DL
HEMATOCRIT: 48.8 %
HEMOGLOBIN A1C: 6.6 %
HEMOGLOBIN: 17 G/DL
PLATELETS: 255 K/UL
POTASSIUM, SERUM: 3.7 MEQ/L
PROTEIN, TOTAL: 7.4 G/DL
RED BLOOD COUNT: 5.65 X 10-6/U
SGOT (AST): 44 IU/L
SGPT (ALT): 86 IU/L
SODIUM: 136 MEQ/L
WHITE BLOOD COUNT: 11.5 X 10-3/U

## 2018-09-07 ENCOUNTER — OFFICE VISIT (OUTPATIENT)
Dept: FAMILY MEDICINE CLINIC | Facility: CLINIC | Age: 83
End: 2018-09-07
Payer: MEDICARE

## 2018-09-07 VITALS
HEIGHT: 68 IN | WEIGHT: 180 LBS | HEART RATE: 74 BPM | OXYGEN SATURATION: 98 % | RESPIRATION RATE: 16 BRPM | DIASTOLIC BLOOD PRESSURE: 56 MMHG | BODY MASS INDEX: 27.28 KG/M2 | SYSTOLIC BLOOD PRESSURE: 116 MMHG | TEMPERATURE: 97 F

## 2018-09-07 DIAGNOSIS — G47.62 NOCTURNAL LEG CRAMPS: Primary | ICD-10-CM

## 2018-09-07 PROCEDURE — 99213 OFFICE O/P EST LOW 20 MIN: CPT | Performed by: FAMILY MEDICINE

## 2018-09-07 NOTE — PROGRESS NOTES
Patient is here for follow-up after he was evaluated in the emergency room within the past 2 weeks for severe bilateral thigh pain as it turned out both exam and follow-up support the diagnosis of severe leg cramps syndrome.   Workups have excluded other is

## 2018-09-11 ENCOUNTER — TELEPHONE (OUTPATIENT)
Dept: FAMILY MEDICINE CLINIC | Facility: CLINIC | Age: 83
End: 2018-09-11

## 2018-09-11 RX ORDER — LOSARTAN POTASSIUM 100 MG/1
100 TABLET ORAL
Qty: 90 TABLET | Refills: 1 | Status: SHIPPED | OUTPATIENT
Start: 2018-09-11 | End: 2019-03-09

## 2018-11-06 RX ORDER — FUROSEMIDE 20 MG/1
TABLET ORAL
Qty: 270 TABLET | Refills: 2 | Status: SHIPPED | OUTPATIENT
Start: 2018-11-06 | End: 2019-05-21

## 2018-12-13 ENCOUNTER — OFFICE VISIT (OUTPATIENT)
Dept: PODIATRY CLINIC | Facility: CLINIC | Age: 83
End: 2018-12-13
Payer: MEDICARE

## 2018-12-13 DIAGNOSIS — E11.42 DIABETIC POLYNEUROPATHY ASSOCIATED WITH TYPE 2 DIABETES MELLITUS (HCC): Primary | ICD-10-CM

## 2018-12-13 DIAGNOSIS — M21.6X2 PLANTAR FLEXED METATARSAL BONE OF LEFT FOOT: ICD-10-CM

## 2018-12-13 DIAGNOSIS — M20.41 HAMMER TOES OF BOTH FEET: ICD-10-CM

## 2018-12-13 DIAGNOSIS — L84 HELOMA DURUM: ICD-10-CM

## 2018-12-13 DIAGNOSIS — B35.1 ONYCHOMYCOSIS: ICD-10-CM

## 2018-12-13 DIAGNOSIS — Z89.421 HISTORY OF PARTIAL AMPUTATION OF TOE OF RIGHT FOOT (HCC): ICD-10-CM

## 2018-12-13 DIAGNOSIS — L84 CALLUS OF FOOT: ICD-10-CM

## 2018-12-13 DIAGNOSIS — M20.42 HAMMER TOES OF BOTH FEET: ICD-10-CM

## 2018-12-13 DIAGNOSIS — M21.6X1 PLANTAR FLEXED METATARSAL BONE OF RIGHT FOOT: ICD-10-CM

## 2018-12-13 PROCEDURE — 99213 OFFICE O/P EST LOW 20 MIN: CPT | Performed by: PODIATRIST

## 2018-12-13 NOTE — PROGRESS NOTES
Alexsandra Shelton is a 80year old male. Patient presents with:  Diabetic Foot Care: Saw Dr Elton Velez on 09/07/18. Last A1c was 6.6 on 01/19/18. BG was 139 this AM. States feet have coldness and numbness sensation. States feet do not have much pain today.    Ramin Diagnosis Date   • Bronchitis, not specified as acute or chronic    • Cancer ([facility] [facility]     prostate cancer -seed implants   • Coronary atherosclerosis of unspecified type of vessel, native or graft    • Dermatophytosis of foot    • Dermatophytosis of the bod Number of children: Not on file      Years of education: Not on file      Highest education level: Not on file    Tobacco Use      Smoking status: Former Smoker        Quit date: 1980        Years since quittin.9      Smokeless tobacco: Never Used above.    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

## 2018-12-25 PROBLEM — I70.0 AORTIC ATHEROSCLEROSIS (HCC): Status: ACTIVE | Noted: 2018-12-25

## 2018-12-25 PROBLEM — M47.819 ARTHRITIS OF LOW BACK: Status: ACTIVE | Noted: 2018-12-25

## 2018-12-27 ENCOUNTER — OFFICE VISIT (OUTPATIENT)
Dept: FAMILY MEDICINE CLINIC | Facility: CLINIC | Age: 83
End: 2018-12-27
Payer: MEDICARE

## 2018-12-27 VITALS
TEMPERATURE: 98 F | RESPIRATION RATE: 16 BRPM | OXYGEN SATURATION: 98 % | BODY MASS INDEX: 27.28 KG/M2 | DIASTOLIC BLOOD PRESSURE: 68 MMHG | WEIGHT: 180 LBS | SYSTOLIC BLOOD PRESSURE: 126 MMHG | HEART RATE: 81 BPM | HEIGHT: 68 IN

## 2018-12-27 DIAGNOSIS — K57.92 DIVERTICULITIS: Primary | ICD-10-CM

## 2018-12-27 PROCEDURE — 99213 OFFICE O/P EST LOW 20 MIN: CPT | Performed by: FAMILY MEDICINE

## 2018-12-27 RX ORDER — LANCETS
EACH MISCELLANEOUS
Qty: 100 EACH | Refills: 2 | Status: SHIPPED | OUTPATIENT
Start: 2018-12-27 | End: 2019-05-21

## 2018-12-27 NOTE — PROGRESS NOTES
Patient is here with pain in the left lower most quadrant of his abdomen. It has been going on for the past week without nausea vomiting or diarrhea. He does have a history of diverticular disease.     Reviewed his current blood sugars there is running a

## 2019-01-21 RX ORDER — SIMVASTATIN 10 MG
TABLET ORAL
Qty: 90 TABLET | Refills: 2 | Status: SHIPPED | OUTPATIENT
Start: 2019-01-21 | End: 2019-10-13

## 2019-01-29 ENCOUNTER — TELEPHONE (OUTPATIENT)
Dept: FAMILY MEDICINE CLINIC | Facility: CLINIC | Age: 84
End: 2019-01-29

## 2019-01-29 NOTE — TELEPHONE ENCOUNTER
He got a call about returning losartan 100 MG Oral Tab but he said he is going to need another prescription. He wants to know if we know anything about this?

## 2019-01-29 NOTE — TELEPHONE ENCOUNTER
Pt states he received a call from [facility]  about \"taking a medication back to them\", but no phone number. Advised pt to consult with [facility] as this looks like the only pharmacy pt uses. Pt expressed understanding and agreement. Task completed.

## 2019-01-31 ENCOUNTER — OFFICE VISIT (OUTPATIENT)
Dept: PODIATRY CLINIC | Facility: CLINIC | Age: 84
End: 2019-01-31
Payer: MEDICARE

## 2019-01-31 DIAGNOSIS — E11.42 DIABETIC POLYNEUROPATHY ASSOCIATED WITH TYPE 2 DIABETES MELLITUS (HCC): Primary | ICD-10-CM

## 2019-01-31 DIAGNOSIS — M21.6X2 PLANTAR FLEXED METATARSAL BONE OF LEFT FOOT: ICD-10-CM

## 2019-01-31 DIAGNOSIS — L84 HELOMA DURUM: ICD-10-CM

## 2019-01-31 DIAGNOSIS — M20.41 HAMMER TOES OF BOTH FEET: ICD-10-CM

## 2019-01-31 DIAGNOSIS — L84 CALLUS OF FOOT: ICD-10-CM

## 2019-01-31 DIAGNOSIS — B35.1 ONYCHOMYCOSIS: ICD-10-CM

## 2019-01-31 DIAGNOSIS — M20.42 HAMMER TOES OF BOTH FEET: ICD-10-CM

## 2019-01-31 DIAGNOSIS — M21.6X1 PLANTAR FLEXED METATARSAL BONE OF RIGHT FOOT: ICD-10-CM

## 2019-01-31 DIAGNOSIS — Z89.421 HISTORY OF PARTIAL AMPUTATION OF TOE OF RIGHT FOOT (HCC): ICD-10-CM

## 2019-01-31 PROCEDURE — 99213 OFFICE O/P EST LOW 20 MIN: CPT | Performed by: PODIATRIST

## 2019-01-31 NOTE — PROGRESS NOTES
Bennett Garcia is a 80year old male. Patient presents with:  Diabetic Foot Care: Pt is here for a diabetic foot assessment and nail and callus care. Has a numbness cold senstaion to his feet. Denies pain. Denies any wound. Sees Dr Rafat De Leon for his diabtes. Disp:  Rfl:       Past Medical History:   Diagnosis Date   • Bronchitis, not specified as acute or chronic    • Cancer (HCC)     prostate cancer -seed implants   • Coronary atherosclerosis of unspecified type of vessel, native or graft    • Dermatophytosis       Spouse name: Not on file      Number of children: Not on file      Years of education: Not on file      Highest education level: Not on file    Tobacco Use      Smoking status: Former Smoker        Quit date: 1/1/1980        Years since [employer] the third toe right foot as documented above.     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

## 2019-02-04 RX ORDER — GLIPIZIDE 2.5 MG/1
TABLET, EXTENDED RELEASE ORAL
Qty: 360 TABLET | Refills: 3 | Status: SHIPPED | OUTPATIENT
Start: 2019-02-04 | End: 2020-02-08

## 2019-02-19 ENCOUNTER — TELEPHONE (OUTPATIENT)
Dept: FAMILY MEDICINE CLINIC | Facility: CLINIC | Age: 84
End: 2019-02-19

## 2019-02-19 NOTE — TELEPHONE ENCOUNTER
Medical Records request     RE:  RODO [id number]   [facility] [id number]      [facility]/ [facility]  [phone]  [facility] [place] [phone]    Sent to Medical Records.

## 2019-02-28 VITALS
BODY MASS INDEX: 29.51 KG/M2 | WEIGHT: 188 LBS | SYSTOLIC BLOOD PRESSURE: 138 MMHG | HEIGHT: 67 IN | HEART RATE: 70 BPM | DIASTOLIC BLOOD PRESSURE: 60 MMHG

## 2019-02-28 VITALS
SYSTOLIC BLOOD PRESSURE: 128 MMHG | HEIGHT: 67 IN | WEIGHT: 179 LBS | HEART RATE: 90 BPM | DIASTOLIC BLOOD PRESSURE: 70 MMHG | BODY MASS INDEX: 28.09 KG/M2

## 2019-03-11 RX ORDER — LOSARTAN POTASSIUM 100 MG/1
TABLET ORAL
Qty: 90 TABLET | Refills: 3 | Status: SHIPPED | OUTPATIENT
Start: 2019-03-11 | End: 2019-03-12

## 2019-03-12 ENCOUNTER — OFFICE VISIT (OUTPATIENT)
Dept: FAMILY MEDICINE CLINIC | Facility: CLINIC | Age: 84
End: 2019-03-12
Payer: MEDICARE

## 2019-03-12 ENCOUNTER — TELEPHONE (OUTPATIENT)
Dept: FAMILY MEDICINE CLINIC | Facility: CLINIC | Age: 84
End: 2019-03-12

## 2019-03-12 VITALS
BODY MASS INDEX: 27 KG/M2 | OXYGEN SATURATION: 97 % | HEART RATE: 74 BPM | SYSTOLIC BLOOD PRESSURE: 126 MMHG | RESPIRATION RATE: 16 BRPM | WEIGHT: 179 LBS | DIASTOLIC BLOOD PRESSURE: 68 MMHG | TEMPERATURE: 98 F

## 2019-03-12 DIAGNOSIS — E11.51 DIABETES TYPE 2 WITH ATHEROSCLEROSIS OF ARTERIES OF EXTREMITIES (HCC): ICD-10-CM

## 2019-03-12 DIAGNOSIS — I10 ESSENTIAL HYPERTENSION: ICD-10-CM

## 2019-03-12 DIAGNOSIS — E78.5 HYPERLIPIDEMIA, UNSPECIFIED HYPERLIPIDEMIA TYPE: ICD-10-CM

## 2019-03-12 DIAGNOSIS — N39.41 URGE INCONTINENCE: ICD-10-CM

## 2019-03-12 DIAGNOSIS — I70.209 DIABETES TYPE 2 WITH ATHEROSCLEROSIS OF ARTERIES OF EXTREMITIES (HCC): ICD-10-CM

## 2019-03-12 DIAGNOSIS — Z00.00 MEDICARE ANNUAL WELLNESS VISIT, SUBSEQUENT: Primary | ICD-10-CM

## 2019-03-12 PROCEDURE — G0439 PPPS, SUBSEQ VISIT: HCPCS | Performed by: FAMILY MEDICINE

## 2019-03-12 RX ORDER — LOSARTAN POTASSIUM 100 MG/1
100 TABLET ORAL
Qty: 90 TABLET | Refills: 3 | Status: SHIPPED | OUTPATIENT
Start: 2019-03-12 | End: 2020-07-06

## 2019-03-12 NOTE — PROGRESS NOTES
[facility] REASON FOR VISIT:    Que Strickland is a 80year old male who presents for a Medicare Annual Wellness visit.        Patient Care Team: Patient Care Team:  Hermila Haile DO as PCP - General (Family Practice)  Hermila Haile DO as PCP - [facility]    Isaac TAKE ONE TABLET BY MOUTH ONE TIME DAILY  Disp: 90 tablet Rfl: 2   ONETOUCH ULTRASOFT LANCETS Does not apply Misc Test 2 times daily Disp: 100 each Rfl: 2   Glucose Blood (ONETOUCH ULTRA BLUE) In Vitro Strip Test 2 times daily Disp: 100 strip Rfl: 2   FUROS Latest Ref Rng & Units 10/13/2017 1/9/2017 10/21/2016 2/13/2016 4/10/2015 12/1/2014 9/13/2013   TSH 0.350 - 5.500 mIU/mL 0.944 0.563 1.150 0.957 1.070 1.340 2.010     [facility] WELLNESS LAB REVIEW FLOWSHEET PSA Latest Ref Rng & Units 1/9/2017 9/13/2013 8/21/2012 Correct  Recall \"Ball\": Correct  Recall \"Flag\": Incorrect  Recall \"Tree\":  Incorrect         PREVENTATIVE SERVICES   INDICATIONS AND SCHEDULE Internal Lab or Procedure   Diabetes Screening     HbgA1C   Annually HGBA1C (%)   Date Value   02/21/2014 6.7 Cholesterol (mg/dL)   Date Value   01/09/2017 66     LDL CHOLESTROL (mg/dL)   Date Value   09/13/2013 58       Dilated Eye exam  Annually Data entered on: 2/3/2015   Last Dilated Eye Exam 2/3/2015              ALLERGIES:     Ace Inhibitors          [facility] ENDOSCOPY   • REPAIR ROTATOR CUFF,ACUTE      2002 left /2005 right    • TOE AMPUTATION Right 2/2/2018    Performed by Piter Keane DPM at [facility] MAIN OR      Family History   Problem Relation Age of Onset   • Heart Disease Mother      Immunization Histo with atherosclerosis of arteries of extremities (HCC)  -     CBC WITH DIFFERENTIAL WITH PLATELET; Future  -     COMP METABOLIC PANEL (14); Future  -     HEMOGLOBIN A1C; Future  -     MICROALB/CREAT RATIO, RANDOM URINE;  Future    Essential hypertension  -

## 2019-03-12 NOTE — PROGRESS NOTES
Presents for a [place]. Type II diabetic doing very well for himself. Please refer to the template form for the Medicare annual wellness visit. Next    Sees a dentist and an eye doctor regularly. Up-to-date on all vaccines.     Is a non-smoker

## 2019-03-13 ENCOUNTER — LAB ENCOUNTER (OUTPATIENT)
Dept: LAB | Age: 84
End: 2019-03-13
Attending: FAMILY MEDICINE
Payer: MEDICARE

## 2019-03-13 DIAGNOSIS — E78.5 HYPERLIPIDEMIA, UNSPECIFIED HYPERLIPIDEMIA TYPE: ICD-10-CM

## 2019-03-13 DIAGNOSIS — E11.51 DIABETES TYPE 2 WITH ATHEROSCLEROSIS OF ARTERIES OF EXTREMITIES (HCC): ICD-10-CM

## 2019-03-13 DIAGNOSIS — I70.209 DIABETES TYPE 2 WITH ATHEROSCLEROSIS OF ARTERIES OF EXTREMITIES (HCC): ICD-10-CM

## 2019-03-13 DIAGNOSIS — I10 ESSENTIAL HYPERTENSION: ICD-10-CM

## 2019-03-13 LAB
ALBUMIN SERPL-MCNC: 3.6 G/DL (ref 3.4–5)
ALBUMIN/GLOB SERPL: 1 {RATIO} (ref 1–2)
ALP LIVER SERPL-CCNC: 109 U/L (ref 45–117)
ALT SERPL-CCNC: 39 U/L (ref 16–61)
ANION GAP SERPL CALC-SCNC: 7 MMOL/L (ref 0–18)
AST SERPL-CCNC: 26 U/L (ref 15–37)
BASOPHILS # BLD AUTO: 0.03 X10(3) UL (ref 0–0.2)
BASOPHILS NFR BLD AUTO: 0.5 %
BILIRUB SERPL-MCNC: 0.9 MG/DL (ref 0.1–2)
BUN BLD-MCNC: 15 MG/DL (ref 7–18)
BUN/CREAT SERPL: 14.3 (ref 10–20)
CALCIUM BLD-MCNC: 9.2 MG/DL (ref 8.5–10.1)
CHLORIDE SERPL-SCNC: 109 MMOL/L (ref 98–107)
CHOLEST SMN-MCNC: 174 MG/DL (ref ?–200)
CO2 SERPL-SCNC: 26 MMOL/L (ref 21–32)
CREAT BLD-MCNC: 1.05 MG/DL (ref 0.7–1.3)
CREAT UR-SCNC: 196 MG/DL
DEPRECATED RDW RBC AUTO: 39.9 FL (ref 35.1–46.3)
EOSINOPHIL # BLD AUTO: 0.23 X10(3) UL (ref 0–0.7)
EOSINOPHIL NFR BLD AUTO: 3.5 %
ERYTHROCYTE [DISTWIDTH] IN BLOOD BY AUTOMATED COUNT: 12.2 % (ref 11–15)
EST. AVERAGE GLUCOSE BLD GHB EST-MCNC: 154 MG/DL (ref 68–126)
GLOBULIN PLAS-MCNC: 3.6 G/DL (ref 2.8–4.4)
GLUCOSE BLD-MCNC: 133 MG/DL (ref 70–99)
HBA1C MFR BLD HPLC: 7 % (ref ?–5.7)
HCT VFR BLD AUTO: 44.9 % (ref 39–53)
HDLC SERPL-MCNC: 50 MG/DL (ref 40–59)
HGB BLD-MCNC: 15.4 G/DL (ref 13–17.5)
IMM GRANULOCYTES # BLD AUTO: 0.02 X10(3) UL (ref 0–1)
IMM GRANULOCYTES NFR BLD: 0.3 %
LDLC SERPL CALC-MCNC: 95 MG/DL (ref ?–100)
LYMPHOCYTES # BLD AUTO: 1.63 X10(3) UL (ref 1–4)
LYMPHOCYTES NFR BLD AUTO: 24.8 %
M PROTEIN MFR SERPL ELPH: 7.2 G/DL (ref 6.4–8.2)
MCH RBC QN AUTO: 30.3 PG (ref 26–34)
MCHC RBC AUTO-ENTMCNC: 34.3 G/DL (ref 31–37)
MCV RBC AUTO: 88.4 FL (ref 80–100)
MICROALBUMIN UR-MCNC: 2.12 MG/DL
MICROALBUMIN/CREAT 24H UR-RTO: 10.8 UG/MG (ref ?–30)
MONOCYTES # BLD AUTO: 0.67 X10(3) UL (ref 0.1–1)
MONOCYTES NFR BLD AUTO: 10.2 %
NEUTROPHILS # BLD AUTO: 3.98 X10 (3) UL (ref 1.5–7.7)
NEUTROPHILS # BLD AUTO: 3.98 X10(3) UL (ref 1.5–7.7)
NEUTROPHILS NFR BLD AUTO: 60.7 %
NONHDLC SERPL-MCNC: 124 MG/DL (ref ?–130)
OSMOLALITY SERPL CALC.SUM OF ELEC: 297 MOSM/KG (ref 275–295)
PLATELET # BLD AUTO: 286 10(3)UL (ref 150–450)
POTASSIUM SERPL-SCNC: 3.6 MMOL/L (ref 3.5–5.1)
RBC # BLD AUTO: 5.08 X10(6)UL (ref 3.8–5.8)
SODIUM SERPL-SCNC: 142 MMOL/L (ref 136–145)
TRIGL SERPL-MCNC: 143 MG/DL (ref 30–149)
TSI SER-ACNC: 1.16 MIU/ML (ref 0.36–3.74)
VLDLC SERPL CALC-MCNC: 29 MG/DL (ref 0–30)
WBC # BLD AUTO: 6.6 X10(3) UL (ref 4–11)

## 2019-03-13 PROCEDURE — 80053 COMPREHEN METABOLIC PANEL: CPT

## 2019-03-13 PROCEDURE — 82570 ASSAY OF URINE CREATININE: CPT

## 2019-03-13 PROCEDURE — 83036 HEMOGLOBIN GLYCOSYLATED A1C: CPT

## 2019-03-13 PROCEDURE — 85025 COMPLETE CBC W/AUTO DIFF WBC: CPT

## 2019-03-13 PROCEDURE — 36415 COLL VENOUS BLD VENIPUNCTURE: CPT

## 2019-03-13 PROCEDURE — 80061 LIPID PANEL: CPT

## 2019-03-13 PROCEDURE — 82043 UR ALBUMIN QUANTITATIVE: CPT

## 2019-03-13 PROCEDURE — 84443 ASSAY THYROID STIM HORMONE: CPT

## 2019-03-21 ENCOUNTER — TELEPHONE (OUTPATIENT)
Dept: FAMILY MEDICINE CLINIC | Facility: CLINIC | Age: 84
End: 2019-03-21

## 2019-03-26 ENCOUNTER — TELEPHONE (OUTPATIENT)
Dept: FAMILY MEDICINE CLINIC | Facility: CLINIC | Age: 84
End: 2019-03-26

## 2019-03-26 NOTE — TELEPHONE ENCOUNTER
Patient states he has not received a call about lab results - labs done on 3/13/2019. Please advise.

## 2019-03-26 NOTE — TELEPHONE ENCOUNTER
Patient is calling upset about receiving 9 pages from the insurance company about a denial for his prescription. He does not know what it was for and what he is supposed to do.     I informed him that it was for the Diclofenac gel and that the our office i

## 2019-03-28 NOTE — TELEPHONE ENCOUNTER
Just FYI Diclofenac gel was not covered by insurance. Pt states he does not use this medication and does not want prior auth done.

## 2019-04-10 RX ORDER — LOSARTAN POTASSIUM 100 MG/1
TABLET ORAL
COMMUNITY
Start: 2014-02-05

## 2019-04-10 RX ORDER — CHLORAL HYDRATE 500 MG
CAPSULE ORAL
COMMUNITY
Start: 2009-09-21

## 2019-04-10 RX ORDER — GLIPIZIDE 2.5 MG/1
TABLET, EXTENDED RELEASE ORAL
COMMUNITY
Start: 2018-08-27

## 2019-04-10 RX ORDER — MAGNESIUM 200 MG
TABLET ORAL
COMMUNITY
Start: 2011-07-14

## 2019-04-10 RX ORDER — SIMVASTATIN 10 MG
TABLET ORAL
COMMUNITY

## 2019-04-10 RX ORDER — FUROSEMIDE 20 MG/1
TABLET ORAL
COMMUNITY
Start: 2018-08-27

## 2019-04-18 ENCOUNTER — TELEPHONE (OUTPATIENT)
Dept: FAMILY MEDICINE CLINIC | Facility: CLINIC | Age: 84
End: 2019-04-18

## 2019-04-18 NOTE — TELEPHONE ENCOUNTER
Patient stopped in this morning - he was at pharmacy and they sent him to us. May wish to  at [address]. Documentation on appeal process for Lancets for date of 11/7/18-12/26/18.     Patient had stated there was an issu

## 2019-05-02 ENCOUNTER — OFFICE VISIT (OUTPATIENT)
Dept: PODIATRY CLINIC | Facility: CLINIC | Age: 84
End: 2019-05-02
Payer: MEDICARE

## 2019-05-02 DIAGNOSIS — M21.6X2 PLANTAR FLEXED METATARSAL BONE OF LEFT FOOT: Primary | ICD-10-CM

## 2019-05-02 DIAGNOSIS — L84 CALLUS OF FOOT: ICD-10-CM

## 2019-05-02 DIAGNOSIS — E11.42 DIABETIC POLYNEUROPATHY ASSOCIATED WITH TYPE 2 DIABETES MELLITUS (HCC): ICD-10-CM

## 2019-05-02 DIAGNOSIS — M20.41 HAMMER TOES OF BOTH FEET: ICD-10-CM

## 2019-05-02 DIAGNOSIS — L84 HELOMA DURUM: ICD-10-CM

## 2019-05-02 DIAGNOSIS — Z89.421 HISTORY OF PARTIAL AMPUTATION OF TOE OF RIGHT FOOT (HCC): ICD-10-CM

## 2019-05-02 DIAGNOSIS — M20.42 HAMMER TOES OF BOTH FEET: ICD-10-CM

## 2019-05-02 DIAGNOSIS — M21.6X1 PLANTAR FLEXED METATARSAL BONE OF RIGHT FOOT: ICD-10-CM

## 2019-05-02 DIAGNOSIS — B35.1 ONYCHOMYCOSIS: ICD-10-CM

## 2019-05-02 PROCEDURE — 99213 OFFICE O/P EST LOW 20 MIN: CPT | Performed by: PODIATRIST

## 2019-05-06 NOTE — PROGRESS NOTES
Nish Parish is a 80year old male. Patient presents with:  Diabetic Foot Care: Saw Dr Ama Torre on 03/12/19. A1c was 7.0 on 03/13/19. BG was 134 this AM.   Callus: bilateral feet -- Rates pain 7/10. States tip of right middle toe has a skin lesion.    Toen Disp:  Rfl:    Diclofenac Sodium 1 % Transdermal Gel apply 4 times aday Disp: 3 Tube Rfl: 3   ONETOUCH ULTRASOFT LANCETS Does not apply Misc Test 2 times daily Disp: 100 each Rfl: 2   Glucose Blood (ONETOUCH ULTRA BLUE) In Vitro Strip Test 2 times daily Vishal Mckeon 2002 left /2005 right    • TOE AMPUTATION Right 2/2/2018    Performed by Darren Avila DPM at [facility] MAIN OR      Family History   Problem Relation Age of Onset   • Heart Disease Mother       Social History    Socioeconomic History      Marital status and forefoot loading and are rigid. He has significant atrophy of the fat pad on both feet in the ball of his foot.   He has hyperkeratoses which are painful underneath the fourth metatarsal head bilaterally and the first metatarsal head there is also hype

## 2019-05-16 ENCOUNTER — OFFICE VISIT (OUTPATIENT)
Dept: FAMILY MEDICINE CLINIC | Facility: CLINIC | Age: 84
End: 2019-05-16
Payer: MEDICARE

## 2019-05-16 VITALS
TEMPERATURE: 98 F | DIASTOLIC BLOOD PRESSURE: 68 MMHG | HEART RATE: 86 BPM | OXYGEN SATURATION: 98 % | HEIGHT: 68 IN | BODY MASS INDEX: 27.43 KG/M2 | WEIGHT: 181 LBS | SYSTOLIC BLOOD PRESSURE: 124 MMHG | RESPIRATION RATE: 16 BRPM

## 2019-05-16 DIAGNOSIS — N39.3 STRESS INCONTINENCE, MALE: Primary | ICD-10-CM

## 2019-05-16 PROCEDURE — 99213 OFFICE O/P EST LOW 20 MIN: CPT | Performed by: FAMILY MEDICINE

## 2019-05-16 NOTE — PROGRESS NOTES
Patient is here today because of ongoing issues with urine retention. He will have sudden loss of urine. There is no associated nausea vomiting chills or fever. I have him on mirror tech 50 mg daily. It still helps but only partially.     About 4 years

## 2019-05-21 RX ORDER — LANCETS
EACH MISCELLANEOUS
Qty: 100 EACH | Refills: 3 | Status: SHIPPED | OUTPATIENT
Start: 2019-05-21 | End: 2020-07-07

## 2019-05-21 RX ORDER — FUROSEMIDE 20 MG/1
TABLET ORAL
Qty: 270 TABLET | Refills: 1 | Status: SHIPPED | OUTPATIENT
Start: 2019-05-21 | End: 2019-10-20

## 2019-05-21 NOTE — TELEPHONE ENCOUNTER
[address] [place] [address] stated the Dx code 11.59 on the test strips and lancets prescription does not exist, can we give them a correct one?, also they did not get the furosemide prescription. Please call and advise.

## 2019-06-12 ENCOUNTER — HOSPITAL ENCOUNTER (EMERGENCY)
Facility: HOSPITAL | Age: 84
Discharge: HOME | End: 2019-06-12
Attending: EMERGENCY MEDICINE
Payer: MEDICARE

## 2019-06-12 ENCOUNTER — APPOINTMENT (OUTPATIENT)
Dept: GENERAL RADIOLOGY | Facility: HOSPITAL | Age: 84
End: 2019-06-12
Attending: EMERGENCY MEDICINE
Payer: MEDICARE

## 2019-06-12 VITALS
SYSTOLIC BLOOD PRESSURE: 153 MMHG | DIASTOLIC BLOOD PRESSURE: 98 MMHG | HEART RATE: 58 BPM | WEIGHT: 180 LBS | RESPIRATION RATE: 16 BRPM | BODY MASS INDEX: 27 KG/M2 | OXYGEN SATURATION: 97 % | TEMPERATURE: 98 F

## 2019-06-12 DIAGNOSIS — R06.00 DYSPNEA, UNSPECIFIED TYPE: Primary | ICD-10-CM

## 2019-06-12 PROCEDURE — 99284 EMERGENCY DEPT VISIT MOD MDM: CPT

## 2019-06-12 PROCEDURE — 83880 ASSAY OF NATRIURETIC PEPTIDE: CPT | Performed by: EMERGENCY MEDICINE

## 2019-06-12 PROCEDURE — 80053 COMPREHEN METABOLIC PANEL: CPT | Performed by: EMERGENCY MEDICINE

## 2019-06-12 PROCEDURE — 84484 ASSAY OF TROPONIN QUANT: CPT | Performed by: EMERGENCY MEDICINE

## 2019-06-12 PROCEDURE — 36415 COLL VENOUS BLD VENIPUNCTURE: CPT

## 2019-06-12 PROCEDURE — 71045 X-RAY EXAM CHEST 1 VIEW: CPT | Performed by: EMERGENCY MEDICINE

## 2019-06-12 NOTE — ED PROVIDER NOTES
Patient Seen in: [facility] Emergency Department    History   No chief complaint on file.     Stated Complaint: VENKATA    HPI    70-year-old male with history of coronary artery disease, diabetes mellitus, hypertension, presents emergency room for evalu and idiopathic peripheral neuropathy    • Urethral stricture    • Urinary frequency        Past Surgical History:   Procedure Laterality Date   • ANESTHESIA FOR EAR SURGERY  age 8   • CABG  01/01/2007    CABG x4   • CATARACT Bilateral    • CIRCUMCISION AD midline. No cervical lymphadenopathy. HEART: Regular rate and rhythm, no murmurs. LUNGS: Clear to auscultation bilaterally. No Rales, no rhonchi, no wheezing, no stridor.   ABDOMEN: Soft, nondistended,non tender, bowel sounds are present, no rebound, no diagnosis)    Disposition:  Discharge  6/12/2019  8:49 am    Follow-up:  Melvin Griffiths DO  [address] [place] [address]  [phone]    In 1 day          Medications Prescribed:  Current Discharge Medication List

## 2019-06-12 NOTE — ED NOTES
MD at bedside.  Discussing his discharge plans with the patient he is to follow up with his primar tomorrow

## 2019-06-14 ENCOUNTER — OFFICE VISIT (OUTPATIENT)
Dept: FAMILY MEDICINE CLINIC | Facility: CLINIC | Age: 84
End: 2019-06-14
Payer: MEDICARE

## 2019-06-14 ENCOUNTER — TELEPHONE (OUTPATIENT)
Dept: FAMILY MEDICINE CLINIC | Facility: CLINIC | Age: 84
End: 2019-06-14

## 2019-06-14 VITALS
HEART RATE: 68 BPM | DIASTOLIC BLOOD PRESSURE: 62 MMHG | OXYGEN SATURATION: 96 % | WEIGHT: 179 LBS | HEIGHT: 68 IN | RESPIRATION RATE: 18 BRPM | TEMPERATURE: 98 F | SYSTOLIC BLOOD PRESSURE: 100 MMHG | BODY MASS INDEX: 27.13 KG/M2

## 2019-06-14 DIAGNOSIS — T50.905A ADVERSE EFFECT OF DRUG, INITIAL ENCOUNTER: Primary | ICD-10-CM

## 2019-06-14 PROCEDURE — 99214 OFFICE O/P EST MOD 30 MIN: CPT | Performed by: FAMILY MEDICINE

## 2019-06-14 NOTE — PROGRESS NOTES
Earlier this week patient woke up with rapid breathing. He called the paramedics who brought him into the [facility] he was evaluated and a phone call was sent to me from the emergency room physician.   This is a 68-year-old gentleman lives alone shop

## 2019-06-14 NOTE — TELEPHONE ENCOUNTER
Pt was in for an office visit on 6/14/19. We tried calling his daughter but she was at work. She should be calling back on Monday 6/17/19 to discuss her father's health.  Please forward the call to Dr Kristina Lopez

## 2019-06-15 ENCOUNTER — TELEPHONE (OUTPATIENT)
Dept: FAMILY MEDICINE CLINIC | Facility: CLINIC | Age: 84
End: 2019-06-15

## 2019-06-15 NOTE — TELEPHONE ENCOUNTER
WAS ASKED TO CALL AND TALK TO DR COOPER REGARDING HER DAD. SHE IS CALLING BACK. PLS LET HIM KNOW SHE CALLED.

## 2019-06-15 NOTE — TELEPHONE ENCOUNTER
DR. Quinton Pelletier,  Daughter, Ann De La Vega called you back, please return her call at [phone] regarding Neel Saunderss.

## 2019-06-20 ENCOUNTER — OFFICE VISIT (OUTPATIENT)
Dept: FAMILY MEDICINE CLINIC | Facility: CLINIC | Age: 84
End: 2019-06-20
Payer: MEDICARE

## 2019-06-20 ENCOUNTER — HOSPITAL ENCOUNTER (OUTPATIENT)
Dept: GENERAL RADIOLOGY | Age: 84
Discharge: HOME | End: 2019-06-20
Attending: FAMILY MEDICINE
Payer: MEDICARE

## 2019-06-20 VITALS
SYSTOLIC BLOOD PRESSURE: 132 MMHG | OXYGEN SATURATION: 98 % | TEMPERATURE: 98 F | HEART RATE: 74 BPM | WEIGHT: 178 LBS | RESPIRATION RATE: 18 BRPM | BODY MASS INDEX: 26.98 KG/M2 | HEIGHT: 68 IN | DIASTOLIC BLOOD PRESSURE: 64 MMHG

## 2019-06-20 DIAGNOSIS — R10.9 ACUTE ABDOMINAL PAIN: ICD-10-CM

## 2019-06-20 DIAGNOSIS — R10.9 ACUTE ABDOMINAL PAIN: Primary | ICD-10-CM

## 2019-06-20 PROCEDURE — 99213 OFFICE O/P EST LOW 20 MIN: CPT | Performed by: FAMILY MEDICINE

## 2019-06-20 PROCEDURE — 74018 RADEX ABDOMEN 1 VIEW: CPT | Performed by: FAMILY MEDICINE

## 2019-06-20 NOTE — PROGRESS NOTES
Patient is here and requested follow-up after period of rapid heart rate and shortness of breath and a mild change in his normal personality.   Since that time I spoke to his daughter Valeria Solano she went out to spend some time with her father on Father's Day an

## 2019-07-09 ENCOUNTER — OFFICE VISIT (OUTPATIENT)
Dept: PODIATRY CLINIC | Facility: CLINIC | Age: 84
End: 2019-07-09
Payer: MEDICARE

## 2019-07-09 ENCOUNTER — TELEPHONE (OUTPATIENT)
Dept: FAMILY MEDICINE CLINIC | Facility: CLINIC | Age: 84
End: 2019-07-09

## 2019-07-09 DIAGNOSIS — B35.1 ONYCHOMYCOSIS: ICD-10-CM

## 2019-07-09 DIAGNOSIS — M21.6X2 PLANTAR FLEXED METATARSAL BONE OF LEFT FOOT: ICD-10-CM

## 2019-07-09 DIAGNOSIS — Z89.421 HISTORY OF PARTIAL AMPUTATION OF TOE OF RIGHT FOOT (HCC): ICD-10-CM

## 2019-07-09 DIAGNOSIS — M21.6X1 PLANTAR FLEXED METATARSAL BONE OF RIGHT FOOT: ICD-10-CM

## 2019-07-09 DIAGNOSIS — L84 HELOMA DURUM: ICD-10-CM

## 2019-07-09 DIAGNOSIS — E11.42 DIABETIC POLYNEUROPATHY ASSOCIATED WITH TYPE 2 DIABETES MELLITUS (HCC): ICD-10-CM

## 2019-07-09 DIAGNOSIS — L84 CALLUS OF FOOT: ICD-10-CM

## 2019-07-09 DIAGNOSIS — M20.41 HAMMER TOES OF BOTH FEET: ICD-10-CM

## 2019-07-09 DIAGNOSIS — M20.42 HAMMER TOES OF BOTH FEET: ICD-10-CM

## 2019-07-09 PROCEDURE — 99213 OFFICE O/P EST LOW 20 MIN: CPT | Performed by: PODIATRIST

## 2019-07-09 NOTE — PROGRESS NOTES
Rasheeda Farah is a 80year old male.  Patient presents with:  Diabetic Foot Care: 2 mo f/u - last HiZ1A=9.0 from 3/13/19and LOV with PCP Dr Mele Borges was 6/20/19 - states his feet feel cold and numb at all the time  - this AM his ZP=734        HPI:   Patient mouth daily. Disp:  Rfl:    Omega-3 Fatty Acids (FISH OIL) 1000 MG Oral Cap Take  by mouth. Take two capsules daily Disp:  Rfl:    Magnesium 100 MG Oral Tab Take 250 mg by mouth daily.  Take two tablets daily  Disp:  Rfl:       Past Medical History:   Diagn 2/2/2018    Performed by Alma Dhaliwal DPM at [facility] MAIN OR      Family History   Problem Relation Age of Onset   • Heart Disease Mother       Social History    Socioeconomic History      Marital status:        Spouse name: Not on file      Number significant atrophy of the fat pad on both feet in the ball of his foot. He has hyperkeratoses which are painful underneath the fourth metatarsal head bilaterally and the first metatarsal head there is also hyperkeratosis but not as painful.   There is als

## 2019-07-12 ENCOUNTER — HOSPITAL ENCOUNTER (OUTPATIENT)
Age: 84
Discharge: HOME | End: 2019-07-12
Attending: FAMILY MEDICINE
Payer: MEDICARE

## 2019-07-12 VITALS
WEIGHT: 180 LBS | SYSTOLIC BLOOD PRESSURE: 127 MMHG | HEIGHT: 69 IN | OXYGEN SATURATION: 96 % | TEMPERATURE: 98 F | DIASTOLIC BLOOD PRESSURE: 63 MMHG | BODY MASS INDEX: 26.66 KG/M2 | HEART RATE: 81 BPM | RESPIRATION RATE: 18 BRPM

## 2019-07-12 DIAGNOSIS — H61.21 IMPACTED CERUMEN OF RIGHT EAR: Primary | ICD-10-CM

## 2019-07-12 DIAGNOSIS — H69.81 ACUTE DYSFUNCTION OF RIGHT EUSTACHIAN TUBE: ICD-10-CM

## 2019-07-12 PROCEDURE — 99213 OFFICE O/P EST LOW 20 MIN: CPT

## 2019-07-12 PROCEDURE — 69209 REMOVE IMPACTED EAR WAX UNI: CPT

## 2019-07-12 RX ORDER — FLUTICASONE PROPIONATE 50 MCG
SPRAY, SUSPENSION (ML) NASAL
Qty: 1 INHALER | Refills: 0 | Status: SHIPPED | OUTPATIENT
Start: 2019-07-12 | End: 2020-02-12 | Stop reason: ALTCHOICE

## 2019-07-12 NOTE — ED PROVIDER NOTES
Patient Seen in: [facility]    History   Patient presents with:  Ear Problem Pain (neurosensory)    Stated Complaint: RIGHT EAR PAIN X 2 DAYS    HPI    This 77-year-old male presents to the office with complaint of the right ear [address]   • COLONOSCOPY N/A 5/1/2017    Performed by rIina Winters MD at [address] N/A 2/25/2016    Performed by Ashley Prado MD at [address] [facility]   • ESOPHAGOGASTRODUODENOSCOPY (E noted.  SKIN: Warm and dry. ED Course   Labs Reviewed - No data to display           MDM     The right ear was irrigated by nursing staff. Repeat ear exam shows the EAC to be normal.  There is no evidence for infection.   The right TM appears mildly r

## 2019-07-19 ENCOUNTER — OFFICE VISIT (OUTPATIENT)
Dept: FAMILY MEDICINE CLINIC | Facility: CLINIC | Age: 84
End: 2019-07-19
Payer: MEDICARE

## 2019-07-19 VITALS
WEIGHT: 178 LBS | HEIGHT: 68 IN | RESPIRATION RATE: 18 BRPM | OXYGEN SATURATION: 98 % | SYSTOLIC BLOOD PRESSURE: 116 MMHG | HEART RATE: 86 BPM | DIASTOLIC BLOOD PRESSURE: 78 MMHG | TEMPERATURE: 98 F | BODY MASS INDEX: 26.98 KG/M2

## 2019-07-19 DIAGNOSIS — H91.93 BILATERAL HEARING LOSS, UNSPECIFIED HEARING LOSS TYPE: ICD-10-CM

## 2019-07-19 DIAGNOSIS — H68.013: Primary | ICD-10-CM

## 2019-07-19 PROCEDURE — 99213 OFFICE O/P EST LOW 20 MIN: CPT | Performed by: FAMILY MEDICINE

## 2019-07-19 NOTE — PROGRESS NOTES
Several weeks worth of marked nasal congestion and fullness in his ears. He is significantly hard of hearing.   Today's exam vital signs unremarkable no rashes no nodes his joints are spared otoscopic exam surprisingly unremarkable but his tympanic membran

## 2019-07-25 NOTE — TELEPHONE ENCOUNTER
Form from [facility] requesting dx- DM for test strips  and how often pt tests BID,  See refill request from 5/19

## 2019-08-19 ENCOUNTER — OFFICE VISIT (OUTPATIENT)
Dept: CARDIOLOGY | Age: 84
End: 2019-08-19

## 2019-08-19 VITALS
HEIGHT: 68 IN | WEIGHT: 179 LBS | HEART RATE: 68 BPM | DIASTOLIC BLOOD PRESSURE: 60 MMHG | BODY MASS INDEX: 27.13 KG/M2 | SYSTOLIC BLOOD PRESSURE: 130 MMHG

## 2019-08-19 DIAGNOSIS — I25.10 CORONARY ARTERY DISEASE INVOLVING NATIVE CORONARY ARTERY OF NATIVE HEART WITHOUT ANGINA PECTORIS: ICD-10-CM

## 2019-08-19 DIAGNOSIS — I10 ESSENTIAL HYPERTENSION: ICD-10-CM

## 2019-08-19 DIAGNOSIS — E11.9 TYPE 2 DIABETES MELLITUS WITHOUT COMPLICATION, WITHOUT LONG-TERM CURRENT USE OF INSULIN (CMD): ICD-10-CM

## 2019-08-19 DIAGNOSIS — Z95.1 HX OF CABG: Primary | ICD-10-CM

## 2019-08-19 PROCEDURE — 99214 OFFICE O/P EST MOD 30 MIN: CPT | Performed by: INTERNAL MEDICINE

## 2019-08-19 ASSESSMENT — ENCOUNTER SYMPTOMS
HEMOPTYSIS: 0
CHILLS: 0
WEIGHT LOSS: 0
FEVER: 0
ALLERGIC/IMMUNOLOGIC COMMENTS: NO NEW FOOD ALLERGIES
SUSPICIOUS LESIONS: 0
COUGH: 0
WEIGHT GAIN: 0
BRUISES/BLEEDS EASILY: 0
HEMATOCHEZIA: 0

## 2019-08-30 ENCOUNTER — OFFICE VISIT (OUTPATIENT)
Dept: FAMILY MEDICINE CLINIC | Facility: CLINIC | Age: 84
End: 2019-08-30
Payer: MEDICARE

## 2019-08-30 VITALS
SYSTOLIC BLOOD PRESSURE: 124 MMHG | WEIGHT: 179 LBS | DIASTOLIC BLOOD PRESSURE: 72 MMHG | BODY MASS INDEX: 27.13 KG/M2 | HEIGHT: 68 IN | TEMPERATURE: 97 F | OXYGEN SATURATION: 97 % | RESPIRATION RATE: 16 BRPM | HEART RATE: 76 BPM

## 2019-08-30 DIAGNOSIS — H65.91 RIGHT OTITIS MEDIA WITH EFFUSION: Primary | ICD-10-CM

## 2019-08-30 PROCEDURE — 99212 OFFICE O/P EST SF 10 MIN: CPT | Performed by: PHYSICIAN ASSISTANT

## 2019-08-30 NOTE — PROGRESS NOTES
HPI:    Patient ID: Carlyn Hernandez is a 80year old male. HPI   Patient presents for follow up of ear symptoms. Recently underwent tympanostomy of the right ear. States his pain and pressure have resolved. Hearing is at baseline.  Denies dizziness, tinnit tablets daily  Disp:  Rfl:    Fluticasone Propionate 50 MCG/ACT Nasal Suspension Use 2 sprays each nostril once daily after shower. Stop if you develop nose bleeds. Disp: 1 Inhaler Rfl: 0     Allergies:   Ace Inhibitors          Coughing  Augmentin [Amoxici

## 2019-09-10 ENCOUNTER — OFFICE VISIT (OUTPATIENT)
Dept: FAMILY MEDICINE CLINIC | Facility: CLINIC | Age: 84
End: 2019-09-10
Payer: MEDICARE

## 2019-09-10 DIAGNOSIS — E11.51 DIABETES TYPE 2 WITH ATHEROSCLEROSIS OF ARTERIES OF EXTREMITIES (HCC): ICD-10-CM

## 2019-09-10 DIAGNOSIS — I25.10 CORONARY ARTERY DISEASE INVOLVING NATIVE CORONARY ARTERY OF NATIVE HEART WITHOUT ANGINA PECTORIS: Primary | ICD-10-CM

## 2019-09-10 DIAGNOSIS — E11.42 DIABETIC POLYNEUROPATHY ASSOCIATED WITH TYPE 2 DIABETES MELLITUS (HCC): ICD-10-CM

## 2019-09-10 DIAGNOSIS — I70.209 DIABETES TYPE 2 WITH ATHEROSCLEROSIS OF ARTERIES OF EXTREMITIES (HCC): ICD-10-CM

## 2019-09-12 ENCOUNTER — OFFICE VISIT (OUTPATIENT)
Dept: PODIATRY CLINIC | Facility: CLINIC | Age: 84
End: 2019-09-12
Payer: MEDICARE

## 2019-09-12 DIAGNOSIS — M21.6X1 PLANTAR FLEXED METATARSAL BONE OF RIGHT FOOT: ICD-10-CM

## 2019-09-12 DIAGNOSIS — E11.42 DIABETIC POLYNEUROPATHY ASSOCIATED WITH TYPE 2 DIABETES MELLITUS (HCC): ICD-10-CM

## 2019-09-12 DIAGNOSIS — M21.6X2 PLANTAR FLEXED METATARSAL BONE OF LEFT FOOT: Primary | ICD-10-CM

## 2019-09-12 DIAGNOSIS — M20.41 HAMMER TOES OF BOTH FEET: ICD-10-CM

## 2019-09-12 DIAGNOSIS — Z89.421 HISTORY OF PARTIAL AMPUTATION OF TOE OF RIGHT FOOT (HCC): ICD-10-CM

## 2019-09-12 DIAGNOSIS — L84 CALLUS OF FOOT: ICD-10-CM

## 2019-09-12 DIAGNOSIS — L84 HELOMA DURUM: ICD-10-CM

## 2019-09-12 DIAGNOSIS — M20.42 HAMMER TOES OF BOTH FEET: ICD-10-CM

## 2019-09-12 DIAGNOSIS — B35.1 ONYCHOMYCOSIS: ICD-10-CM

## 2019-09-12 PROCEDURE — 11057 PARNG/CUTG B9 HYPRKR LES >4: CPT | Performed by: PODIATRIST

## 2019-09-12 PROCEDURE — 11721 DEBRIDE NAIL 6 OR MORE: CPT | Performed by: PODIATRIST

## 2019-09-16 NOTE — PROGRESS NOTES
Dominick iYp is a 80year old male. Patient presents with:  Diabetic Foot Care: LOV 7/9/19, LOV w/Dr Yusuf Griggs 9/10/19, last hgba1c 3/13/19 7.0, BG this am 138. \"they're OK\" pt states that they feel cold and numb at times. pt denies pain.  pt w/no other comp B-12 1000 MCG Oral Tab Take 1,000 mcg by mouth daily. Disp:  Rfl:    Aspirin 81 MG Oral Tab Take 81 mg by mouth daily. Disp:  Rfl:    Multiple Vitamins-Minerals (CENTRUM SILVER) Oral Tab Take 1 Tab by mouth daily.  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 Musculoskeletal: Patient has hammertoes digits 3 and 5 right, 2-5 left. These do not reduce and forefoot loading and are rigid.   He has significant atrophy of the fat pad on

## 2019-10-09 ENCOUNTER — APPOINTMENT (OUTPATIENT)
Dept: GENERAL RADIOLOGY | Facility: HOSPITAL | Age: 84
End: 2019-10-09
Payer: MEDICARE

## 2019-10-09 ENCOUNTER — HOSPITAL ENCOUNTER (EMERGENCY)
Facility: HOSPITAL | Age: 84
Discharge: HOME | End: 2019-10-09
Attending: EMERGENCY MEDICINE
Payer: MEDICARE

## 2019-10-09 ENCOUNTER — APPOINTMENT (OUTPATIENT)
Dept: CT IMAGING | Facility: HOSPITAL | Age: 84
End: 2019-10-09
Attending: EMERGENCY MEDICINE
Payer: MEDICARE

## 2019-10-09 VITALS
HEART RATE: 64 BPM | RESPIRATION RATE: 16 BRPM | SYSTOLIC BLOOD PRESSURE: 152 MMHG | OXYGEN SATURATION: 98 % | DIASTOLIC BLOOD PRESSURE: 67 MMHG | TEMPERATURE: 97 F

## 2019-10-09 DIAGNOSIS — S22.42XA CLOSED FRACTURE OF MULTIPLE RIBS OF LEFT SIDE, INITIAL ENCOUNTER: ICD-10-CM
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
## 2019-10-09 PROCEDURE — 73060 X-RAY EXAM OF HUMERUS: CPT

## 2019-10-09 PROCEDURE — 99285 EMERGENCY DEPT VISIT HI MDM: CPT

## 2019-10-09 PROCEDURE — 71111 X-RAY EXAM RIBS/CHEST4/> VWS: CPT

## 2019-10-09 PROCEDURE — 70450 CT HEAD/BRAIN W/O DYE: CPT | Performed by: EMERGENCY MEDICINE

## 2019-10-09 PROCEDURE — 73030 X-RAY EXAM OF SHOULDER: CPT

## 2019-10-09 PROCEDURE — 73130 X-RAY EXAM OF HAND: CPT

## 2019-10-09 PROCEDURE — 99284 EMERGENCY DEPT VISIT MOD MDM: CPT

## 2019-10-09 NOTE — ED PROVIDER NOTES
Patient Seen in: [facility] Emergency Department      History   Patient presents with:  Fall (musculoskeletal, neurologic)    Stated Complaint: fall 4 days ago, shoulder pain    80year old male who is not on blood thinners presents for left lower r [address]   • COLONOSCOPY N/A 5/1/2017    Performed by Norma Bryson MD at [address] [address] N/A 2/25/2016    Performed by Odilon Brantley MD at [address] [facility]   • ESOPHAGOGASTRODUODENOSCOPY (E Cardiovascular: Normal rate, regular rhythm, normal heart sounds and intact distal pulses. Pulmonary/Chest: Effort normal and breath sounds normal. He exhibits tenderness (left anterior/lateral lower rib pain. No flail chest or crepitus. ).    Abdominal PATIENT STATED HISTORY: (As transcribed by Technologist)  Patient fell 4 days ago and landed on his left humerus and has pain in the left shoulder area. Patient has no left elbow pain. 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   No acute f midline shift. No acute intracranial hemorrhage or findings of territorial infarction. There is likely mild to moderate chronic small vessel disease in the white matter, similar to the prior.   The visualized paranasal sinuses and mastoid air cells are sa as needed for pain and follow-up. Attending agrees with plan of care.             Disposition and Plan     Clinical Impression:  Fall, initial encounter  (primary encounter diagnosis)  Closed fracture of multiple ribs of left side, initial encounter    Dis

## 2019-10-09 NOTE — ED NOTES
I reviewed that chart and discussed the case. I have examined the patient and noted the patient had a mechanical fall 3 days ago. He states he fell downstairs and fell onto his left shoulder.   He has pain to the left shoulder and left side of his chest w 10/09/2019 at 12:28     Approved by: Kaleb Eid MD            Xr Humerus (min 2 Views), Left (cpt=73060)    Result Date: 10/9/2019  PROCEDURE:  XR HUMERUS (MIN 2 VIEWS), LEFT (CPT=73060)  INDICATIONS:  fall 4 days ago, shoulder pain  COMPARISON:  [facility] , ([address]) which includes the Dose Index Registry.   PATIENT STATED HISTORY: (As transcribed by Technologist)  Patient fell 4 days ago and has left shoulder pain    FINDINGS:  Ventricles and sulci are diffusely prominent in caliber, on 10/09/2019 at 13:03     Approved by: Mai Fisher MD          Incentive spirometry was done. The patient is alert neurologically intact no other focal findings this is a mechanical fall.   I agree with the following clinical impression(s): Probable rib fr

## 2019-10-09 NOTE — ED INITIAL ASSESSMENT (HPI)
Patient fell on stairs 4 days ago, c/o left shoulder pain. States he fell down three stairs. States he also has some pain to left ribcage and bruising to hands bilaterally. Patient did hit his left cheek.

## 2019-10-11 ENCOUNTER — OFFICE VISIT (OUTPATIENT)
Dept: FAMILY MEDICINE CLINIC | Facility: CLINIC | Age: 84
End: 2019-10-11
Payer: MEDICARE

## 2019-10-11 VITALS
HEIGHT: 68 IN | DIASTOLIC BLOOD PRESSURE: 70 MMHG | WEIGHT: 177 LBS | OXYGEN SATURATION: 97 % | RESPIRATION RATE: 18 BRPM | SYSTOLIC BLOOD PRESSURE: 104 MMHG | HEART RATE: 85 BPM | BODY MASS INDEX: 26.83 KG/M2

## 2019-10-11 DIAGNOSIS — Z23 NEED FOR VACCINATION: ICD-10-CM

## 2019-10-11 DIAGNOSIS — M79.602 PAIN OF LEFT UPPER EXTREMITY: ICD-10-CM

## 2019-10-11 DIAGNOSIS — S22.49XS CLOSED FRACTURE OF MULTIPLE RIBS, UNSPECIFIED LATERALITY, SEQUELA: Primary | ICD-10-CM

## 2019-10-11 PROCEDURE — 90662 IIV NO PRSV INCREASED AG IM: CPT | Performed by: FAMILY MEDICINE

## 2019-10-11 PROCEDURE — 99214 OFFICE O/P EST MOD 30 MIN: CPT | Performed by: FAMILY MEDICINE

## 2019-10-11 PROCEDURE — G0008 ADMIN INFLUENZA VIRUS VAC: HCPCS | Performed by: FAMILY MEDICINE

## 2019-10-11 NOTE — PROGRESS NOTES
Here for follow-up of injured right hand left anterior ribs face. This all occurred about 1 month ago when he accidentally fell down some stairs while attending a wedding. There is no loss of consciousness no amnesia for events.   He was immediately evalu

## 2019-10-14 RX ORDER — SIMVASTATIN 10 MG
TABLET ORAL
Qty: 90 TABLET | Refills: 3 | Status: SHIPPED | OUTPATIENT
Start: 2019-10-14 | End: 2020-11-02

## 2019-10-15 ENCOUNTER — APPOINTMENT (OUTPATIENT)
Dept: GENERAL RADIOLOGY | Facility: HOSPITAL | Age: 84
End: 2019-10-15
Attending: EMERGENCY MEDICINE
Payer: MEDICARE

## 2019-10-15 ENCOUNTER — APPOINTMENT (OUTPATIENT)
Dept: CT IMAGING | Facility: HOSPITAL | Age: 84
End: 2019-10-15
Attending: EMERGENCY MEDICINE
Payer: MEDICARE

## 2019-10-15 ENCOUNTER — HOSPITAL ENCOUNTER (EMERGENCY)
Facility: HOSPITAL | Age: 84
Discharge: HOME | End: 2019-10-15
Attending: EMERGENCY MEDICINE
Payer: MEDICARE

## 2019-10-15 VITALS
OXYGEN SATURATION: 97 % | DIASTOLIC BLOOD PRESSURE: 76 MMHG | HEIGHT: 69 IN | TEMPERATURE: 98 F | BODY MASS INDEX: 26.66 KG/M2 | RESPIRATION RATE: 18 BRPM | WEIGHT: 180 LBS | SYSTOLIC BLOOD PRESSURE: 163 MMHG | HEART RATE: 68 BPM

## 2019-10-15 DIAGNOSIS — S20.212A RIB CONTUSION, LEFT, INITIAL ENCOUNTER: Primary | ICD-10-CM

## 2019-10-15 DIAGNOSIS — S20.212A CONTUSION OF LEFT CHEST WALL, INITIAL ENCOUNTER: ICD-10-CM

## 2019-10-15 PROCEDURE — 99285 EMERGENCY DEPT VISIT HI MDM: CPT

## 2019-10-15 PROCEDURE — 80053 COMPREHEN METABOLIC PANEL: CPT | Performed by: EMERGENCY MEDICINE

## 2019-10-15 PROCEDURE — 93010 ELECTROCARDIOGRAM REPORT: CPT

## 2019-10-15 PROCEDURE — 85025 COMPLETE CBC W/AUTO DIFF WBC: CPT | Performed by: EMERGENCY MEDICINE

## 2019-10-15 PROCEDURE — 71275 CT ANGIOGRAPHY CHEST: CPT | Performed by: EMERGENCY MEDICINE

## 2019-10-15 PROCEDURE — 36415 COLL VENOUS BLD VENIPUNCTURE: CPT

## 2019-10-15 PROCEDURE — 84484 ASSAY OF TROPONIN QUANT: CPT | Performed by: EMERGENCY MEDICINE

## 2019-10-15 PROCEDURE — 71045 X-RAY EXAM CHEST 1 VIEW: CPT | Performed by: EMERGENCY MEDICINE

## 2019-10-15 PROCEDURE — 93005 ELECTROCARDIOGRAM TRACING: CPT

## 2019-10-15 NOTE — ED PROVIDER NOTES
Patient Seen in: [facility] Emergency Department      History   Patient presents with:  Dyspnea VENKATA SOB (respiratory)    Stated Complaint: VENKATA    HPI    The patient is a 44-year-old male presenting to the emergency department due to waking up with s stricture    • Urinary frequency               Past Surgical History:   Procedure Laterality Date   • ANESTHESIA FOR EAR SURGERY  age 8   • CABG  01/01/2007    CABG x4   • CATARACT Bilateral    • CIRCUMCISION ADULT N/A 2/25/2016    Performed by Lana Quintana tenderness, no pain with flexion, extension, rotation. Cardiovascular: Moderate left-sided anterior chest wall and lateral left-sided chest wall tenderness. No crepitus. No tracheal deviation.   Regular rhythm without murmurs rubs or gallops, no periphe DRAW BLUE   RAINBOW DRAW LAVENDER   RAINBOW DRAW LIGHT GREEN   RAINBOW DRAW GOLD   CBC W/ DIFFERENTIAL     EKG    Rate, intervals and axes as noted on EKG Report.   Rate: 61  Rhythm: Sinus Rhythm  Reading: Normal sinus rhythm with sinus arrhythmia, no other pneumothorax. Calcified granulomata. MEDIASTINUM/KAY:  No mediastinal or hilar adenopathy is appreciated. Small hiatal hernia. PLEURA:  Normal.    CARDIAC:  Coronary artery calcifications.     CHEST WALL:  Median sternotomy approximated by wire control and physical therapy, but he declined, I offered transfer to subacute rehab facility be declined. He states that he feels well enough to go home.   He lives at home alone, prepares his own meals, is able to do his own laundry, and while I reexamine

## 2019-10-15 NOTE — ED INITIAL ASSESSMENT (HPI)
Patient presents with c/o VENKATA this morning. Patient states he just feels \"short of breath. \" Patient reports fall about 10 days ago where he had a few fractured ribs on the left side. Reports some continued soreness there.  Patient in no visible distress o

## 2019-10-21 RX ORDER — FUROSEMIDE 20 MG/1
TABLET ORAL
Qty: 270 TABLET | Refills: 1 | Status: SHIPPED | OUTPATIENT
Start: 2019-10-21 | End: 2020-03-19

## 2019-10-22 ENCOUNTER — HOSPITAL ENCOUNTER (OUTPATIENT)
Dept: PHYSICAL THERAPY | Facility: HOSPITAL | Age: 84
Setting detail: THERAPIES SERIES
Discharge: HOME | End: 2019-10-22
Attending: FAMILY MEDICINE
Payer: MEDICARE

## 2019-10-22 DIAGNOSIS — M79.602 PAIN OF LEFT UPPER EXTREMITY: ICD-10-CM

## 2019-10-22 DIAGNOSIS — S22.49XS CLOSED FRACTURE OF MULTIPLE RIBS, UNSPECIFIED LATERALITY, SEQUELA: ICD-10-CM

## 2019-10-22 PROCEDURE — 97162 PT EVAL MOD COMPLEX 30 MIN: CPT

## 2019-10-22 PROCEDURE — 97110 THERAPEUTIC EXERCISES: CPT

## 2019-10-22 NOTE — PROGRESS NOTES
SHOULDER EVALUATION:   Referring Physician: Dr. Fern Galvan  Diagnosis: Fracture of multiple ribs, Pain L UE     Date of Service: 10/22/2019     PATIENT SUMMARY   Carlos White is a 80year old male who presents to therapy today with complaints of L shoulder shoulder/supraspinatus insertion, AC joint, L lower ribs/ T8-10-anteriorly/intercostals tenderness  Sensation: intact light touch, symmetrically, no neuro complaints   Cervical Screen: wfl    AROM: (* denotes performed with pain)  Shoulder  Elbow-wnl B thr overhead cabinets without pain or restriction   · Pt will improve shoulder abduction AROM to >/=100 degrees to improve ability to don deodorant, don/doff shirts, and wash hair   · Pt will increase shoulder functional AROM ER to T1 to be able to reach and f

## 2019-10-24 ENCOUNTER — HOSPITAL ENCOUNTER (OUTPATIENT)
Dept: PHYSICAL THERAPY | Facility: HOSPITAL | Age: 84
Setting detail: THERAPIES SERIES
Discharge: HOME | End: 2019-10-24
Attending: FAMILY MEDICINE
Payer: MEDICARE

## 2019-10-24 PROCEDURE — 97110 THERAPEUTIC EXERCISES: CPT

## 2019-10-24 PROCEDURE — 97140 MANUAL THERAPY 1/> REGIONS: CPT

## 2019-10-24 NOTE — PROGRESS NOTES
Dx: Fracture of multiple ribs, Pain L UE             Authorized # of Visits:  Medicare, POC 10         Next MD visit: none scheduled  Fall Risk: standard         Precautions: fall risk            Subjective: L arm 3-4/1-, ribs 5/10. Sleep disturbance.  Doin shoulder mechanics and stabilization with lifting and reaching   · Pt will be independent and compliant with comprehensive HEP to maintain progress achieved in PT         Plan: Continue plan of care as pt tolerates with focus on Therapeutic exercises to in

## 2019-10-26 ENCOUNTER — APPOINTMENT (OUTPATIENT)
Dept: CT IMAGING | Facility: HOSPITAL | Age: 84
DRG: 872 | End: 2019-10-26
Attending: EMERGENCY MEDICINE
Payer: MEDICARE

## 2019-10-26 ENCOUNTER — HOSPITAL ENCOUNTER (INPATIENT)
Facility: HOSPITAL | Age: 84
LOS: 2 days | Discharge: HOME | DRG: 872 | End: 2019-10-29
Attending: EMERGENCY MEDICINE | Admitting: INTERNAL MEDICINE
Payer: MEDICARE

## 2019-10-26 DIAGNOSIS — N28.9 ACUTE RENAL INSUFFICIENCY: ICD-10-CM

## 2019-10-26 DIAGNOSIS — R73.9 HYPERGLYCEMIA: ICD-10-CM

## 2019-10-26 DIAGNOSIS — K52.9 ACUTE COLITIS: Primary | ICD-10-CM

## 2019-10-26 PROCEDURE — 99223 1ST HOSP IP/OBS HIGH 75: CPT | Performed by: INTERNAL MEDICINE

## 2019-10-26 PROCEDURE — 74177 CT ABD & PELVIS W/CONTRAST: CPT | Performed by: EMERGENCY MEDICINE

## 2019-10-26 RX ORDER — SODIUM CHLORIDE 9 MG/ML
125 INJECTION, SOLUTION INTRAVENOUS CONTINUOUS
Status: DISCONTINUED | OUTPATIENT
Start: 2019-10-26 | End: 2019-10-29

## 2019-10-26 RX ORDER — METRONIDAZOLE 500 MG/100ML
500 INJECTION, SOLUTION INTRAVENOUS ONCE
Status: COMPLETED | OUTPATIENT
Start: 2019-10-26 | End: 2019-10-27

## 2019-10-26 RX ORDER — LEVOFLOXACIN 5 MG/ML
750 INJECTION, SOLUTION INTRAVENOUS ONCE
Status: COMPLETED | OUTPATIENT
Start: 2019-10-26 | End: 2019-10-27

## 2019-10-27 PROBLEM — N28.9 ACUTE RENAL INSUFFICIENCY: Status: ACTIVE | Noted: 2019-10-27

## 2019-10-27 PROBLEM — R73.9 HYPERGLYCEMIA: Status: ACTIVE | Noted: 2019-10-27

## 2019-10-27 PROCEDURE — 99232 SBSQ HOSP IP/OBS MODERATE 35: CPT | Performed by: INTERNAL MEDICINE

## 2019-10-27 RX ORDER — PRAVASTATIN SODIUM 10 MG
20 TABLET ORAL NIGHTLY
Status: DISCONTINUED | OUTPATIENT
Start: 2019-10-27 | End: 2019-10-29

## 2019-10-27 RX ORDER — SODIUM CHLORIDE 9 MG/ML
INJECTION, SOLUTION INTRAVENOUS CONTINUOUS
Status: ACTIVE | OUTPATIENT
Start: 2019-10-27 | End: 2019-10-27

## 2019-10-27 RX ORDER — FUROSEMIDE 20 MG/1
20 TABLET ORAL DAILY
Status: DISCONTINUED | OUTPATIENT
Start: 2019-10-27 | End: 2019-10-28

## 2019-10-27 RX ORDER — TRAZODONE HYDROCHLORIDE 50 MG/1
50 TABLET ORAL NIGHTLY PRN
Status: DISCONTINUED | OUTPATIENT
Start: 2019-10-27 | End: 2019-10-29

## 2019-10-27 RX ORDER — FLUTICASONE PROPIONATE 50 MCG
1 SPRAY, SUSPENSION (ML) NASAL DAILY
Status: DISCONTINUED | OUTPATIENT
Start: 2019-10-27 | End: 2019-10-29

## 2019-10-27 RX ORDER — ASPIRIN 81 MG/1
81 TABLET, CHEWABLE ORAL DAILY
Status: DISCONTINUED | OUTPATIENT
Start: 2019-10-27 | End: 2019-10-29

## 2019-10-27 RX ORDER — DEXTROSE MONOHYDRATE 25 G/50ML
50 INJECTION, SOLUTION INTRAVENOUS
Status: DISCONTINUED | OUTPATIENT
Start: 2019-10-27 | End: 2019-10-29

## 2019-10-27 RX ORDER — LEVOFLOXACIN 5 MG/ML
750 INJECTION, SOLUTION INTRAVENOUS
Status: DISCONTINUED | OUTPATIENT
Start: 2019-10-29 | End: 2019-10-28

## 2019-10-27 RX ORDER — ONDANSETRON 2 MG/ML
4 INJECTION INTRAMUSCULAR; INTRAVENOUS EVERY 6 HOURS PRN
Status: DISCONTINUED | OUTPATIENT
Start: 2019-10-27 | End: 2019-10-29

## 2019-10-27 RX ORDER — ENOXAPARIN SODIUM 100 MG/ML
40 INJECTION SUBCUTANEOUS DAILY
Status: DISCONTINUED | OUTPATIENT
Start: 2019-10-27 | End: 2019-10-29

## 2019-10-27 RX ORDER — METOCLOPRAMIDE HYDROCHLORIDE 5 MG/ML
10 INJECTION INTRAMUSCULAR; INTRAVENOUS EVERY 8 HOURS PRN
Status: DISCONTINUED | OUTPATIENT
Start: 2019-10-27 | End: 2019-10-29

## 2019-10-27 RX ORDER — OXYBUTYNIN CHLORIDE 10 MG/1
10 TABLET, EXTENDED RELEASE ORAL DAILY
Status: DISCONTINUED | OUTPATIENT
Start: 2019-10-27 | End: 2019-10-29

## 2019-10-27 RX ORDER — METOCLOPRAMIDE HYDROCHLORIDE 5 MG/ML
5 INJECTION INTRAMUSCULAR; INTRAVENOUS EVERY 8 HOURS PRN
Status: DISCONTINUED | OUTPATIENT
Start: 2019-10-27 | End: 2019-10-27

## 2019-10-27 RX ORDER — POTASSIUM CHLORIDE 20 MEQ/1
40 TABLET, EXTENDED RELEASE ORAL EVERY 4 HOURS
Status: COMPLETED | OUTPATIENT
Start: 2019-10-27 | End: 2019-10-27

## 2019-10-27 RX ORDER — SODIUM CHLORIDE 9 MG/ML
INJECTION, SOLUTION INTRAVENOUS CONTINUOUS
Status: DISCONTINUED | OUTPATIENT
Start: 2019-10-27 | End: 2019-10-29

## 2019-10-27 RX ORDER — POTASSIUM CHLORIDE 20 MEQ/1
40 TABLET, EXTENDED RELEASE ORAL ONCE
Status: COMPLETED | OUTPATIENT
Start: 2019-10-27 | End: 2019-10-27

## 2019-10-27 RX ORDER — METRONIDAZOLE 500 MG/100ML
500 INJECTION, SOLUTION INTRAVENOUS EVERY 8 HOURS
Status: DISCONTINUED | OUTPATIENT
Start: 2019-10-27 | End: 2019-10-29

## 2019-10-27 NOTE — PROGRESS NOTES
Pharmacy Note: Renal dose adjustment   Sarthak Brown was originally prescribed Metoclopramide 10 mg  every 8 hours as needed which was renally dose adjusted at the time of the original order per P&T approved renal dosing protocol.   Renal function has now i

## 2019-10-27 NOTE — PLAN OF CARE
Patient is a&ox4.  and RA. Vital signs stable. Denies any current pain. Multiple bowel movements today with blood present when wiping. Adequate dietary intake.  IVF paused till new IV site can be established per patient's request. Kem Hameed was at bedside

## 2019-10-27 NOTE — ED INITIAL ASSESSMENT (HPI)
Pt presents to ED with complaint of rectal bleeding and pain. Pt reports he has a lot of pain on the \"left butt cheek. \" Pt reports he feels as if there is a sore and something is hanging out.  Denies use of blood thinners

## 2019-10-27 NOTE — PROGRESS NOTES
[facility]  Progress Note     Que Strickland Patient Status:  Inpatient    1927 MRN [id number]   [facility] 3NE-A Attending Jordy Barkley MD   Hosp Day # 0 PCP Jimmy Bonilla DO     Chief Complaint: payton taylor    S: Patient Imaging: Imaging data reviewed in Epic.     Medications:   • aspirin  81 mg Oral Daily   • Oxybutynin Chloride ER  10 mg Oral Daily   • Fluticasone Propionate  1 spray Each Nare Daily   • Pravastatin Sodium  20 mg Oral Nightly   • enoxaparin  40 mg Subc

## 2019-10-27 NOTE — PROGRESS NOTES
Pharmacy renal dose adjustment for metoclopramide (Reglan)    Robby Austingarett has been prescribed metoclopramide 10 mg every 8 hours as needed for nausea/vomiting. Estimated Creatinine Clearance: 34.4 mL/min (A) (based on SCr of 1.37 mg/dL (H)).     The e

## 2019-10-27 NOTE — H&P
[facility]  History and Physical     Robby Eldridge Patient Status:  Inpatient    1927 MRN [id number]   [facility] 3NE-A Attending Razia Saleh,    Hosp Day # 0 PCP      Chief Complaint: Abdominal Pain Procedure Laterality Date   • AMPUTATION TOE,I-P JT     • ANESTHESIA FOR EAR SURGERY  age 8   • CABG  01/01/2007    CABG x4   • CATARACT Bilateral    • CIRCUMCISION ADULT N/A 2/25/2016    Performed by Mel Sheehan MD at [address] [place] [address]   • Disp: 90 tablet, Rfl: 3  GlipiZIDE ER 2.5 MG Oral Tablet 24 Hr, TAKE 2 TABLETS BY MOUTH IN THE MORNING AND 2 TABLETS BY MOUTH IN THE EVENING, Disp: 360 tablet, Rfl: 3  Mirabegron ER (MYRBETRIQ) 50 MG Oral Tablet 24 Hr, Take by mouth., Disp: , Rfl:   Vitami 21.4*   HGB 14.8   MCV 87.2   .0   INR 1.03       Recent Labs   Lab 10/26/19  2025   *   BUN 22*   CREATSERUM 1.37*   GFRAA 51*   GFRNAA 44*   CA 8.9   ALB 3.6      K 4.3      CO2 22.0   ALKPHO 132*   AST 42*   ALT 49   BILT 1.2

## 2019-10-27 NOTE — PLAN OF CARE
PT ADMITTED A/O, ACCOMPANIED BY DAUGHTER, STATES HE HAS RECTAL PAIN, HAD SMEAR OF BLOOD FROM RECTUM, SEPSIS BOLUS INFUSING FROM ER, PER DR. ZEPEDA'S ORDER TO STOP BOLUS, RECEIVED 1500CC TOTAL OF BOLUS, IVF INFUSING AT 100CC/HR, 2ND LACTIC ACID DRAWN, IV L

## 2019-10-27 NOTE — PROGRESS NOTES
[facility] Pharmacy Note:  Renal Adjustment for levofloxacin (Deena Cherry)    Manjinder Barney is a 80year old male who has been prescribed levofloxacin (LEVAQUIN) 500 mg every 24 hrs.   CrCl is estimated creatinine clearance is 34.4 mL/min (A) (based on SCr of 1.37 mg

## 2019-10-28 PROCEDURE — 99232 SBSQ HOSP IP/OBS MODERATE 35: CPT | Performed by: INTERNAL MEDICINE

## 2019-10-28 RX ORDER — LEVOFLOXACIN 5 MG/ML
750 INJECTION, SOLUTION INTRAVENOUS EVERY 24 HOURS
Status: DISCONTINUED | OUTPATIENT
Start: 2019-10-28 | End: 2019-10-29

## 2019-10-28 RX ORDER — LOPERAMIDE HYDROCHLORIDE 2 MG/1
2 CAPSULE ORAL 4 TIMES DAILY PRN
Status: DISCONTINUED | OUTPATIENT
Start: 2019-10-28 | End: 2019-10-29

## 2019-10-28 RX ORDER — GARLIC EXTRACT 500 MG
1 CAPSULE ORAL DAILY
Status: DISCONTINUED | OUTPATIENT
Start: 2019-10-28 | End: 2019-10-29

## 2019-10-28 RX ORDER — POTASSIUM CHLORIDE 20 MEQ/1
40 TABLET, EXTENDED RELEASE ORAL ONCE
Status: COMPLETED | OUTPATIENT
Start: 2019-10-28 | End: 2019-10-28

## 2019-10-28 NOTE — PROGRESS NOTES
[facility]  Progress Note     Avery Sheldon Patient Status:  Inpatient    1927 MRN [id number]   [facility] 3NE-A Attending Jose Angel Reddy MD   Hosp Day # 1 PCP Jyoti Emery DO     Chief Complaint: payton taylor    S: Patient Estimated Creatinine Clearance: 55.5 mL/min (based on SCr of 0.85 mg/dL). Recent Labs   Lab 10/26/19  2025   PTP 13.9   INR 1.03       No results for input(s): TROP, CK in the last 168 hours. Imaging: Imaging data reviewed in Epic.     Medi

## 2019-10-28 NOTE — PROGRESS NOTES
Pharmacy Note: Renal dose adjustment   Manjinder Barney was originally prescribed levofloxacin 750 mg IV every 24 hours which was renally dose adjusted at the time of the original order per P&T approved renal dosing protocol. Renal function has now improved.

## 2019-10-28 NOTE — PLAN OF CARE
Patient is a&ox4.  and RA. Vital signs stable. Up with standby. Developed diarrhea this AM. Patient verbalized that he would not be able to take care of self at home due to diarrhea. IV antibiotics per MAR. Denies any other complaints. IVF running.  Brittany

## 2019-10-28 NOTE — DIETARY NOTE
[facility]     Admitting diagnosis:  Acute renal insufficiency [N28.9]  Hyperglycemia [R73.9]  Acute colitis [K52.9]    Ht: 175.3 cm (5' 9\")  Wt: 79.3 kg (174 lb 12.8 oz).  This is *108% of IBW  Body mass index is

## 2019-10-28 NOTE — DISCHARGE SUMMARY
[facility]  DISCHARGE SUMMARY     Estelle Brito Patient Status:  Inpatient    1927 MRN [id number]   [facility] 3NE-A Attending Suzette Mcmillan MD   [facility] Day # 2 PCP Casandra Camarillo DO     Date of Admission: 10/26/2019  Date Tab by mouth daily. Refills:  0     Fesoterodine Fumarate ER 4 MG Tb24  Commonly known as:  TOVIAZ      Take 4 mg by mouth daily.    Refills:  0     Fluticasone Propionate 50 MCG/ACT Susp  Commonly known as:  FLONASE      Use 2 sprays each nostril once da 7 days. Stop taking on:  November 5, 2019  Quantity:  7 tablet  Refills:  0     metRONIDAZOLE 500 MG Tabs  Commonly known as:  FLAGYL  Ask about: Should I take this medication? Take 1 tablet (500 mg total) by mouth 3 (three) times daily for 7 days. [address], [address] [facility] Rissa Padilla, [address]               11/19/2019  2:00 PM  PT VISIT - BY THERAPIST [7334] 45 min. [facility] Physical Therapy Mortimer Peaches, MELONY    Patient Instructions:          Location Instructions:       Your appointment is jess

## 2019-10-28 NOTE — PLAN OF CARE
Assumed care at 0100. AOx4, hard of hearing, denies chest pain or dyspnea. Room air. Up with assist.  IVF running. Voids. Will monitor.

## 2019-10-28 NOTE — PLAN OF CARE
Patient is A&O x4.  Calm and cooperative. [place]-HA at home. BP elevated, lasix ordered and given. On RA. IV-0.9%  ml/hr. C/o rectal pain, preparation H given. No s/s of bleeding. Accuchecks. Electrolyte protocol. BLE edema. Bed alarm on.  Bed a injury  Description  INTERVENTIONS:  - Assess pt frequently for physical needs  - Identify cognitive and physical deficits and behaviors that affect risk of falls.   - [place] fall precautions as indicated by assessment.  - Educate pt/family on patient sa

## 2019-10-29 VITALS
SYSTOLIC BLOOD PRESSURE: 121 MMHG | TEMPERATURE: 98 F | DIASTOLIC BLOOD PRESSURE: 62 MMHG | WEIGHT: 174.81 LBS | BODY MASS INDEX: 25.89 KG/M2 | HEART RATE: 66 BPM | OXYGEN SATURATION: 95 % | HEIGHT: 69 IN | RESPIRATION RATE: 18 BRPM

## 2019-10-29 PROCEDURE — 99239 HOSP IP/OBS DSCHRG MGMT >30: CPT | Performed by: INTERNAL MEDICINE

## 2019-10-29 RX ORDER — METRONIDAZOLE 500 MG/1
500 TABLET ORAL 3 TIMES DAILY
Qty: 21 TABLET | Refills: 0 | Status: SHIPPED | OUTPATIENT
Start: 2019-10-29 | End: 2019-11-05

## 2019-10-29 RX ORDER — LEVOFLOXACIN 750 MG/1
750 TABLET ORAL DAILY
Qty: 7 TABLET | Refills: 0 | Status: SHIPPED | OUTPATIENT
Start: 2019-10-29 | End: 2019-11-05

## 2019-10-29 RX ORDER — POTASSIUM CHLORIDE 20 MEQ/1
40 TABLET, EXTENDED RELEASE ORAL ONCE
Status: COMPLETED | OUTPATIENT
Start: 2019-10-29 | End: 2019-10-29

## 2019-10-29 NOTE — PLAN OF CARE
Patient alert to baseline and to be discharged home today.      Problem: HEMATOLOGIC - ADULT  Goal: Maintains hematologic stability  Description  INTERVENTIONS  - Assess for signs and symptoms of bleeding or hemorrhage  - Monitor labs and vital signs for tr safety including physical limitations  - Instruct pt to call for assistance with activity based on assessment  - Modify environment to reduce risk of injury  - Provide assistive devices as appropriate  - Consider OT/PT consult to assist with strengthening/

## 2019-10-29 NOTE — PLAN OF CARE
NURSING DISCHARGE NOTE    Discharged Home via Ambulatory. Accompanied by Family member  Belongings Taken by patient/family. IV removed. Discharge instructions reviewed with patient and family member.   All questions answered and patient acknowledged

## 2019-10-30 ENCOUNTER — PATIENT OUTREACH (OUTPATIENT)
Dept: CASE MANAGEMENT | Age: 84
End: 2019-10-30

## 2019-10-30 DIAGNOSIS — Z02.9 ENCOUNTERS FOR ADMINISTRATIVE PURPOSE: ICD-10-CM

## 2019-10-30 NOTE — PROGRESS NOTES
Initial Post Discharge Follow Up   Discharge Date: 10/29/19  Contact Date: 10/30/2019    Consent Verification:  Assessment Completed With: Patient  HIPAA Verified?   Yes    Discharge Dx:   Acute colitis     Was TCC ordered: yes    General:   • How have y Rfl: 3  ONETOUCH ULTRA BLUE In Vitro Strip, Test 2 times daily, Disp: 100 strip, Rfl: 3  Pepsin Does not apply Powder, by Does not apply route., Disp: , Rfl:   Pyridoxine HCl (NEURO-K-250 VITAMIN B6) 250 MG Oral Tab, two tablets daily, Disp: , Rfl:   Multi home, are there any needs or concerns you need addressed before your next visit with your PCP?  (DME, meds, disease concerns, Etc): No     Follow up appointments:      Your appointments     Date & Time Appointment Department [facility])    Oct 31, 2019  2:00 Malick Lee 89 [phone]  [phone]          PCP TCM/HFU appointment: scheduled at D/C within 7-14 days  yes , at [facility] Reviewed/

## 2019-11-04 PROBLEM — K62.5 RECTAL BLEEDING: Status: ACTIVE | Noted: 2019-11-04

## 2019-11-05 ENCOUNTER — HOSPITAL ENCOUNTER (OUTPATIENT)
Dept: PHYSICAL THERAPY | Facility: HOSPITAL | Age: 84
Setting detail: THERAPIES SERIES
Discharge: HOME | End: 2019-11-05
Attending: FAMILY MEDICINE
Payer: MEDICARE

## 2019-11-05 PROCEDURE — 97110 THERAPEUTIC EXERCISES: CPT

## 2019-11-05 PROCEDURE — 97140 MANUAL THERAPY 1/> REGIONS: CPT

## 2019-11-05 NOTE — PROGRESS NOTES
Dx: Fracture of multiple ribs, Pain L UE             Authorized # of Visits:  Medicare, POC 10         Next MD visit: none scheduled  Fall Risk: standard         Precautions: fall risk            Subjective: L arm 5-6/10-dull, ribs 5/10.  Less sleep disturb will demonstrate increased mid/low trap strength to 4+/5 to promote improved shoulder mechanics and stabilization with lifting and reaching   · Pt will be independent and compliant with comprehensive HEP to maintain progress achieved in PT         Plan: Co

## 2019-11-07 ENCOUNTER — HOSPITAL ENCOUNTER (OUTPATIENT)
Dept: PHYSICAL THERAPY | Facility: HOSPITAL | Age: 84
Setting detail: THERAPIES SERIES
Discharge: HOME | End: 2019-11-07
Attending: FAMILY MEDICINE
Payer: MEDICARE

## 2019-11-07 PROCEDURE — 97110 THERAPEUTIC EXERCISES: CPT

## 2019-11-07 PROCEDURE — 97140 MANUAL THERAPY 1/> REGIONS: CPT

## 2019-11-07 NOTE — PROGRESS NOTES
Dx: Fracture of multiple ribs, Pain L UE             Authorized # of Visits:  Medicare, POC 10         Next MD visit: none scheduled  Fall Risk: standard         Precautions: fall risk            Subjective: L arm and ribs are pain-free this afternoon.    I maintain progress achieved in PT       Plan: Continue plan of care as pt tolerates with focus on Therapeutic exercises to include: ROM, stretching, strengthening, HEP, posture education/training, scapular and core stabilization.   Manual therapy to include: Total Timed Treatment: 45 min     Total Treatment Time: 45 min

## 2019-11-08 ENCOUNTER — HOSPITAL ENCOUNTER (OUTPATIENT)
Dept: GENERAL RADIOLOGY | Age: 84
Discharge: HOME | End: 2019-11-08
Attending: FAMILY MEDICINE
Payer: MEDICARE

## 2019-11-08 ENCOUNTER — OFFICE VISIT (OUTPATIENT)
Dept: FAMILY MEDICINE CLINIC | Facility: CLINIC | Age: 84
End: 2019-11-08
Payer: MEDICARE

## 2019-11-08 VITALS
SYSTOLIC BLOOD PRESSURE: 124 MMHG | RESPIRATION RATE: 20 BRPM | WEIGHT: 178.19 LBS | HEIGHT: 69 IN | TEMPERATURE: 98 F | DIASTOLIC BLOOD PRESSURE: 70 MMHG | OXYGEN SATURATION: 99 % | HEART RATE: 60 BPM | BODY MASS INDEX: 26.39 KG/M2

## 2019-11-08 DIAGNOSIS — M79.604 RIGHT LEG PAIN: Primary | ICD-10-CM

## 2019-11-08 DIAGNOSIS — M79.604 RIGHT LEG PAIN: ICD-10-CM

## 2019-11-08 PROCEDURE — 73590 X-RAY EXAM OF LOWER LEG: CPT | Performed by: FAMILY MEDICINE

## 2019-11-08 PROCEDURE — 99213 OFFICE O/P EST LOW 20 MIN: CPT | Performed by: FAMILY MEDICINE

## 2019-11-08 NOTE — PROGRESS NOTES
Presents with persistent pain and swelling in his right lateral leg has had a series of occasional falls that are increasing lately he has been suffering from some depression as well today's exam positive antalgic gait right mid leg.   There is some redness

## 2019-11-12 ENCOUNTER — HOSPITAL ENCOUNTER (OUTPATIENT)
Dept: PHYSICAL THERAPY | Facility: HOSPITAL | Age: 84
Setting detail: THERAPIES SERIES
Discharge: HOME | End: 2019-11-12
Attending: FAMILY MEDICINE
Payer: MEDICARE

## 2019-11-12 PROCEDURE — 97110 THERAPEUTIC EXERCISES: CPT

## 2019-11-12 PROCEDURE — 97140 MANUAL THERAPY 1/> REGIONS: CPT

## 2019-11-12 NOTE — PROGRESS NOTES
Discharge Summary  Pt has attended 5 visits in Physical Therapy.   Dx: Fracture of multiple ribs, Pain L UE             Authorized # of Visits:  Medicare, POC 10         Next MD visit: none scheduled  Fall Risk: standard         Precautions: fall risk able to reach in back pocket, tuck in shirt, and turn steering wheel without pain-MET  · Pt will improve shoulder strength throughout to 4+/5 to improve function with lifting and reaching -MET  · Pt will demonstrate increased mid/low trap strength to 4+/5 ladder x 10. Standing wall push-ups x 10.   There Ex:  Formal re-assessment    UBE 3'F/3'B  L 1     IR/ER x20 ea YTB    scap retraction + row YTB x20    Standing-  IR cane x20    Supine L protraction 2 x 10      Finger ladder x 10   Standing wall push-ups

## 2019-11-14 ENCOUNTER — APPOINTMENT (OUTPATIENT)
Dept: PHYSICAL THERAPY | Facility: HOSPITAL | Age: 84
End: 2019-11-14
Attending: FAMILY MEDICINE
Payer: MEDICARE

## 2019-11-19 ENCOUNTER — OFFICE VISIT (OUTPATIENT)
Dept: PODIATRY CLINIC | Facility: CLINIC | Age: 84
End: 2019-11-19
Payer: MEDICARE

## 2019-11-19 ENCOUNTER — APPOINTMENT (OUTPATIENT)
Dept: PHYSICAL THERAPY | Facility: HOSPITAL | Age: 84
End: 2019-11-19
Attending: FAMILY MEDICINE
Payer: MEDICARE

## 2019-11-19 DIAGNOSIS — Z89.421 HISTORY OF PARTIAL AMPUTATION OF TOE OF RIGHT FOOT (HCC): ICD-10-CM

## 2019-11-19 DIAGNOSIS — M20.42 HAMMER TOES OF BOTH FEET: ICD-10-CM

## 2019-11-19 DIAGNOSIS — E11.42 DIABETIC POLYNEUROPATHY ASSOCIATED WITH TYPE 2 DIABETES MELLITUS (HCC): ICD-10-CM

## 2019-11-19 DIAGNOSIS — L84 HELOMA DURUM: ICD-10-CM

## 2019-11-19 DIAGNOSIS — M21.6X1 PLANTAR FLEXED METATARSAL BONE OF RIGHT FOOT: ICD-10-CM

## 2019-11-19 DIAGNOSIS — M21.6X2 PLANTAR FLEXED METATARSAL BONE OF LEFT FOOT: Primary | ICD-10-CM

## 2019-11-19 DIAGNOSIS — L84 CALLUS OF FOOT: ICD-10-CM

## 2019-11-19 DIAGNOSIS — M20.41 HAMMER TOES OF BOTH FEET: ICD-10-CM

## 2019-11-19 DIAGNOSIS — B35.1 ONYCHOMYCOSIS: ICD-10-CM

## 2019-11-19 PROCEDURE — 11056 PARNG/CUTG B9 HYPRKR LES 2-4: CPT | Performed by: PODIATRIST

## 2019-11-19 PROCEDURE — 11721 DEBRIDE NAIL 6 OR MORE: CPT | Performed by: PODIATRIST

## 2019-11-19 NOTE — PROGRESS NOTES
Manjinder Barney is a 80year old male. Patient presents with:  Diabetic Foot Care: A1c on 10/27/19 at 6.9. LOV with Dr. Jax Bush on 9/10/19. AM blood sugars were 111. C/o a little numbness on the bottom of the feet.         HPI:   Patient returns in clinic co TABLETS BY MOUTH IN THE EVENING 360 tablet 3   • Mirabegron ER (MYRBETRIQ) 50 MG Oral Tablet 24 Hr Take by mouth. • Vitamin B-12 1000 MCG Oral Tab Take 1,000 mcg by mouth daily. • Aspirin 81 MG Oral Tab Take 81 mg by mouth daily.      • Multiple Vit ENDOSCOPY   • CYSTOSCOPY WITH URETHRAL DILATION N/A 2/25/2016    Performed by Maria Del Rosario Law MD at [address] [place] [address]   • ESOPHAGOGASTRODUODENOSCOPY (EGD) N/A 4/30/2017    Performed by Sandro Rogers MD at [facility], posterior tibial pulses   3. Neurologic: Patient cannot feel a [place] 10 g filament in 2 out of the 10 dermatomes bilateral feet   4. Musculoskeletal: Patient has hammertoes digits 3 and 5 right, 2-5 left.   These do not reduce and forefoot loadin 4 mg by mouth daily. • Fluticasone Propionate 50 MCG/ACT Nasal Suspension Use 2 sprays each nostril once daily after shower. Stop if you develop nose bleeds.  1 Inhaler 0   • ONETOUCH ULTRASOFT LANCETS Does not apply Misc Test 2 times daily 100 each 3 cuff sprain    • Type II or unspecified type diabetes mellitus without mention of complication, not stated as uncontrolled    • Type II or unspecified type diabetes mellitus without mention of complication, uncontrolled    • Unspecified hereditary and idio lesions or rashes  RESPIRATORY: denies shortness of breath with exertion  CARDIOVASCULAR: denies chest pain on exertion  GI: denies abdominal pain and denies heartburn  NEURO: denies headaches    EXAM:   There were no vitals taken for this visit.   Physical both feet. This was done uneventfully there was no hemorrhage. There is mild incurvation of the medial and lateral nail borders of the hallux which using a slant back technique were removed and they would not get ingrown.   Using a #15 blade trimmed down

## 2019-11-22 ENCOUNTER — HOSPITAL ENCOUNTER (OUTPATIENT)
Dept: ULTRASOUND IMAGING | Age: 84
Discharge: HOME | End: 2019-11-22
Attending: FAMILY MEDICINE
Payer: MEDICARE

## 2019-11-22 ENCOUNTER — OFFICE VISIT (OUTPATIENT)
Dept: FAMILY MEDICINE CLINIC | Facility: CLINIC | Age: 84
End: 2019-11-22
Payer: MEDICARE

## 2019-11-22 VITALS
RESPIRATION RATE: 16 BRPM | HEART RATE: 76 BPM | HEIGHT: 69 IN | OXYGEN SATURATION: 98 % | DIASTOLIC BLOOD PRESSURE: 70 MMHG | WEIGHT: 178 LBS | TEMPERATURE: 98 F | BODY MASS INDEX: 26.36 KG/M2 | SYSTOLIC BLOOD PRESSURE: 128 MMHG

## 2019-11-22 DIAGNOSIS — I82.403 ACUTE DEEP VEIN THROMBOSIS (DVT) OF BOTH LOWER EXTREMITIES, UNSPECIFIED VEIN (HCC): ICD-10-CM

## 2019-11-22 DIAGNOSIS — M79.604 PAIN OF RIGHT LOWER EXTREMITY: ICD-10-CM

## 2019-11-22 DIAGNOSIS — I82.403 ACUTE DEEP VEIN THROMBOSIS (DVT) OF BOTH LOWER EXTREMITIES, UNSPECIFIED VEIN (HCC): Primary | ICD-10-CM

## 2019-11-22 PROBLEM — I80.9 PHLEBITIS AND THROMBOPHLEBITIS: Status: ACTIVE | Noted: 2019-11-22

## 2019-11-22 PROCEDURE — 99214 OFFICE O/P EST MOD 30 MIN: CPT | Performed by: FAMILY MEDICINE

## 2019-11-22 PROCEDURE — 93970 EXTREMITY STUDY: CPT | Performed by: FAMILY MEDICINE

## 2019-11-22 NOTE — PROGRESS NOTES
Patient is here with rather sudden onset swelling of the right lower extremity.   I saw him 3 weeks ago for a posttraumatic mild spiral fracture of the proximal right fibula at that time he had few complications and was followed up in this office uneventful

## 2019-11-23 ENCOUNTER — TELEPHONE (OUTPATIENT)
Dept: FAMILY MEDICINE CLINIC | Facility: CLINIC | Age: 84
End: 2019-11-23

## 2019-11-23 NOTE — TELEPHONE ENCOUNTER
Spoke to pt informed of negative US. Pt scheduled appt with Dr Noemi Metz for 11/17/2019. Pt states he already takes Furosemide 20mg 2 tablets BID. Per Dr Hugo Ahumada continue current dose.

## 2019-11-23 NOTE — TELEPHONE ENCOUNTER
Per Dr Lambert Last let pt know [address] showed no DVT have pt start Lasix 20mg daily x 10 days . Schedule pt for appt. with him Wednesday 11/27/19 in the morning. LM for pt to call back.

## 2019-11-27 ENCOUNTER — OFFICE VISIT (OUTPATIENT)
Dept: FAMILY MEDICINE CLINIC | Facility: CLINIC | Age: 84
End: 2019-11-27
Payer: MEDICARE

## 2019-11-27 VITALS
SYSTOLIC BLOOD PRESSURE: 136 MMHG | HEIGHT: 69 IN | HEART RATE: 92 BPM | RESPIRATION RATE: 16 BRPM | DIASTOLIC BLOOD PRESSURE: 76 MMHG | BODY MASS INDEX: 25.92 KG/M2 | OXYGEN SATURATION: 94 % | TEMPERATURE: 97 F | WEIGHT: 175 LBS

## 2019-11-27 DIAGNOSIS — I89.1 LYMPHANGITIS: Primary | ICD-10-CM

## 2019-11-27 PROCEDURE — 99213 OFFICE O/P EST LOW 20 MIN: CPT | Performed by: FAMILY MEDICINE

## 2019-11-27 NOTE — PROGRESS NOTES
Patient is here for requested follow-up feels better he again insists that he is taking his Lasix 20 mg 2 tablets twice a day it is witnessed by have decreased edema of both legs especially on the right side there is no tenderness in the recent ultrasound

## 2019-12-06 ENCOUNTER — OFFICE VISIT (OUTPATIENT)
Dept: FAMILY MEDICINE CLINIC | Facility: CLINIC | Age: 84
End: 2019-12-06
Payer: MEDICARE

## 2019-12-06 VITALS
SYSTOLIC BLOOD PRESSURE: 108 MMHG | DIASTOLIC BLOOD PRESSURE: 60 MMHG | BODY MASS INDEX: 27.62 KG/M2 | WEIGHT: 176 LBS | HEART RATE: 78 BPM | OXYGEN SATURATION: 97 % | TEMPERATURE: 98 F | HEIGHT: 67 IN | RESPIRATION RATE: 16 BRPM

## 2019-12-06 DIAGNOSIS — M47.819 ARTHRITIS OF LOW BACK: ICD-10-CM

## 2019-12-06 DIAGNOSIS — M86.171 OTHER ACUTE OSTEOMYELITIS OF RIGHT FOOT (HCC): ICD-10-CM

## 2019-12-06 DIAGNOSIS — E11.621 TYPE 2 DIABETES MELLITUS WITH FOOT ULCER, WITHOUT LONG-TERM CURRENT USE OF INSULIN (HCC): ICD-10-CM

## 2019-12-06 DIAGNOSIS — I80.9 PHLEBITIS ALONE: Primary | ICD-10-CM

## 2019-12-06 DIAGNOSIS — L97.509 TYPE 2 DIABETES MELLITUS WITH FOOT ULCER, WITHOUT LONG-TERM CURRENT USE OF INSULIN (HCC): ICD-10-CM

## 2019-12-06 DIAGNOSIS — C61 MALIGNANT NEOPLASM OF PROSTATE (HCC): ICD-10-CM

## 2019-12-06 PROBLEM — M86.9 PYOGENIC INFLAMMATION OF BONE (HCC): Status: ACTIVE | Noted: 2019-12-06

## 2019-12-06 PROCEDURE — 99213 OFFICE O/P EST LOW 20 MIN: CPT | Performed by: FAMILY MEDICINE

## 2019-12-06 NOTE — PROGRESS NOTES
Here in follow-up feeling better and displaying tremor appearing legs especially on the right side. It is to be recalled that we recently adjusted his Lasix use.   He has no calf tenderness no pitting edema his lungs are clear and heart tones are without g

## 2019-12-19 ENCOUNTER — OFFICE VISIT (OUTPATIENT)
Dept: FAMILY MEDICINE CLINIC | Facility: CLINIC | Age: 84
End: 2019-12-19
Payer: MEDICARE

## 2019-12-19 VITALS
HEART RATE: 82 BPM | TEMPERATURE: 98 F | BODY MASS INDEX: 28.25 KG/M2 | RESPIRATION RATE: 18 BRPM | WEIGHT: 180 LBS | OXYGEN SATURATION: 92 % | DIASTOLIC BLOOD PRESSURE: 84 MMHG | HEIGHT: 67 IN | SYSTOLIC BLOOD PRESSURE: 138 MMHG

## 2019-12-19 DIAGNOSIS — I87.2 VENOUS INSUFFICIENCY, PERIPHERAL: Primary | ICD-10-CM

## 2019-12-19 PROCEDURE — 99213 OFFICE O/P EST LOW 20 MIN: CPT | Performed by: FAMILY MEDICINE

## 2019-12-19 NOTE — PROGRESS NOTES
Here for follow-up.   Just to be recalled that he has been battling swelling of his right leg and we have been adjusting his Lasix up and down in an effort to help reduce his swelling today's exam he is alert aware appropriate admittedly a bit depressed but

## 2020-01-15 ENCOUNTER — OFFICE VISIT (OUTPATIENT)
Dept: FAMILY MEDICINE CLINIC | Facility: CLINIC | Age: 85
End: 2020-01-15
Payer: MEDICARE

## 2020-01-15 VITALS
WEIGHT: 179 LBS | HEIGHT: 67 IN | DIASTOLIC BLOOD PRESSURE: 70 MMHG | RESPIRATION RATE: 16 BRPM | OXYGEN SATURATION: 96 % | SYSTOLIC BLOOD PRESSURE: 136 MMHG | TEMPERATURE: 98 F | BODY MASS INDEX: 28.09 KG/M2 | HEART RATE: 84 BPM

## 2020-01-15 DIAGNOSIS — I82.403 ACUTE DEEP VEIN THROMBOSIS (DVT) OF BOTH LOWER EXTREMITIES, UNSPECIFIED VEIN (HCC): ICD-10-CM

## 2020-01-15 DIAGNOSIS — M86.171 OTHER ACUTE OSTEOMYELITIS OF RIGHT FOOT (HCC): ICD-10-CM

## 2020-01-15 DIAGNOSIS — C61 MALIGNANT NEOPLASM OF PROSTATE (HCC): ICD-10-CM

## 2020-01-15 DIAGNOSIS — Z89.421 HISTORY OF PARTIAL AMPUTATION OF TOE OF RIGHT FOOT (HCC): ICD-10-CM

## 2020-01-15 DIAGNOSIS — E11.621 TYPE 2 DIABETES MELLITUS WITH FOOT ULCER, WITHOUT LONG-TERM CURRENT USE OF INSULIN (HCC): ICD-10-CM

## 2020-01-15 DIAGNOSIS — R60.9 EDEMA, PERIPHERAL: Primary | ICD-10-CM

## 2020-01-15 DIAGNOSIS — L97.509 TYPE 2 DIABETES MELLITUS WITH FOOT ULCER, WITHOUT LONG-TERM CURRENT USE OF INSULIN (HCC): ICD-10-CM

## 2020-01-15 DIAGNOSIS — M47.819 ARTHRITIS OF LOW BACK: ICD-10-CM

## 2020-01-15 PROCEDURE — 99213 OFFICE O/P EST LOW 20 MIN: CPT | Performed by: FAMILY MEDICINE

## 2020-01-15 NOTE — PROGRESS NOTES
This is a follow-up visit we have been trying hard to adjust his fluid retention edema of both legs.   He did have an element of cellulitis with this in the past    I reviewed his blood sugars it varies according to his diet and clearly his enjoyment of bee

## 2020-01-23 ENCOUNTER — OFFICE VISIT (OUTPATIENT)
Dept: PODIATRY CLINIC | Facility: CLINIC | Age: 85
End: 2020-01-23
Payer: MEDICARE

## 2020-01-23 DIAGNOSIS — B35.1 ONYCHOMYCOSIS: ICD-10-CM

## 2020-01-23 DIAGNOSIS — M20.42 HAMMER TOES OF BOTH FEET: ICD-10-CM

## 2020-01-23 DIAGNOSIS — M20.41 HAMMER TOES OF BOTH FEET: ICD-10-CM

## 2020-01-23 DIAGNOSIS — M21.6X1 PLANTAR FLEXED METATARSAL BONE OF RIGHT FOOT: ICD-10-CM

## 2020-01-23 DIAGNOSIS — M21.6X2 PLANTAR FLEXED METATARSAL BONE OF LEFT FOOT: Primary | ICD-10-CM

## 2020-01-23 DIAGNOSIS — Z89.421 HISTORY OF PARTIAL AMPUTATION OF TOE OF RIGHT FOOT (HCC): ICD-10-CM

## 2020-01-23 DIAGNOSIS — E11.42 DIABETIC POLYNEUROPATHY ASSOCIATED WITH TYPE 2 DIABETES MELLITUS (HCC): ICD-10-CM

## 2020-01-23 DIAGNOSIS — L84 HELOMA DURUM: ICD-10-CM

## 2020-01-23 DIAGNOSIS — L84 CALLUS OF FOOT: ICD-10-CM

## 2020-01-23 NOTE — PROGRESS NOTES
Patient returns in clinic complaining of painful calluses  Jeffrey Lord is a 80year old male. Patient presents with:  Diabetic Foot Care: LOV 11/19/19. LOV with Dr Danny Phillips 9/10/19, hgba1c 6.9 on 10/27/19. bg this am 115.  pt states he has experienced some p • Aspirin 81 MG Oral Tab Take 81 mg by mouth daily. • Multiple Vitamins-Minerals (CENTRUM SILVER) Oral Tab Take 1 Tab by mouth daily. • Omega-3 Fatty Acids (FISH OIL) 1000 MG Oral Cap Take  by mouth.  Take two capsules daily     • Magnesium 100 MG Performed by Dai Torres MD [facility] [address]      2002 left /2005 right    • TOE AMPUTATION Right 2/2/2018    Performed by Sejal Love DPM at [facility] MAIN OR      Family History   Problem Relation Age of Onset   • He digits 3 and 5 right, 2-5 left. These do not reduce and forefoot loading and are rigid. He has significant atrophy of the fat pad on both feet in the ball of his foot.   He has hyperkeratoses which are painful underneath the fourth metatarsal head bilater

## 2020-02-03 ENCOUNTER — OFFICE VISIT (OUTPATIENT)
Dept: FAMILY MEDICINE CLINIC | Facility: CLINIC | Age: 85
End: 2020-02-03
Payer: MEDICARE

## 2020-02-03 VITALS
TEMPERATURE: 99 F | OXYGEN SATURATION: 95 % | WEIGHT: 177 LBS | HEIGHT: 67 IN | RESPIRATION RATE: 16 BRPM | BODY MASS INDEX: 27.78 KG/M2 | HEART RATE: 86 BPM | DIASTOLIC BLOOD PRESSURE: 70 MMHG | SYSTOLIC BLOOD PRESSURE: 132 MMHG

## 2020-02-03 DIAGNOSIS — I89.1 LYMPHANGITIS: Primary | ICD-10-CM

## 2020-02-03 PROCEDURE — 99213 OFFICE O/P EST LOW 20 MIN: CPT | Performed by: FAMILY MEDICINE

## 2020-02-03 NOTE — PATIENT INSTRUCTIONS
Recheck of lower leg extremity swelling left greater than right no chest pain no shortness of breath. Weight has been stable. In fact he is lost 2 pounds since his last visit here. Some avulsion of the epidermis is noted from the underlying edema.   Mood

## 2020-02-03 NOTE — PROGRESS NOTES
Patient is here for follow-up has been using furosemide denies chest pain or shortness of breath today's exam bilateral leg edema is noted with sloughing of the epidermis on the right side no secondary infection no pain no lungs are clear and wheeze free.

## 2020-02-08 RX ORDER — GLIPIZIDE 2.5 MG/1
TABLET, EXTENDED RELEASE ORAL
Qty: 360 TABLET | Refills: 1 | Status: SHIPPED | OUTPATIENT
Start: 2020-02-08 | End: 2020-07-21

## 2020-02-12 ENCOUNTER — OFFICE VISIT (OUTPATIENT)
Dept: FAMILY MEDICINE CLINIC | Facility: CLINIC | Age: 85
End: 2020-02-12
Payer: MEDICARE

## 2020-02-12 VITALS
HEART RATE: 66 BPM | SYSTOLIC BLOOD PRESSURE: 118 MMHG | WEIGHT: 176 LBS | DIASTOLIC BLOOD PRESSURE: 70 MMHG | OXYGEN SATURATION: 96 % | TEMPERATURE: 98 F | RESPIRATION RATE: 16 BRPM | BODY MASS INDEX: 28 KG/M2

## 2020-02-12 DIAGNOSIS — I89.1 LYMPHANGITIS: Primary | ICD-10-CM

## 2020-02-12 PROCEDURE — 99213 OFFICE O/P EST LOW 20 MIN: CPT | Performed by: FAMILY MEDICINE

## 2020-02-12 RX ORDER — SENNOSIDES 8.6 MG
650 CAPSULE ORAL EVERY 8 HOURS PRN
COMMUNITY

## 2020-02-12 NOTE — PROGRESS NOTES
Follow-up for bilateral lymphatic and venous insufficiency with subsequent edema he is now on 20 mg of furosemide with excellent results and the swelling is nearly absent with only residual pink discoloration no discomfort    Vibratory sensation unremarkab

## 2020-03-18 ENCOUNTER — TELEPHONE (OUTPATIENT)
Dept: FAMILY MEDICINE CLINIC | Facility: CLINIC | Age: 85
End: 2020-03-18

## 2020-03-18 ENCOUNTER — OFFICE VISIT (OUTPATIENT)
Dept: FAMILY MEDICINE CLINIC | Facility: CLINIC | Age: 85
End: 2020-03-18
Payer: MEDICARE

## 2020-03-18 ENCOUNTER — LAB ENCOUNTER (OUTPATIENT)
Dept: LAB | Age: 85
End: 2020-03-18
Attending: FAMILY MEDICINE
Payer: MEDICARE

## 2020-03-18 VITALS — WEIGHT: 178 LBS | BODY MASS INDEX: 27.94 KG/M2 | HEIGHT: 67 IN

## 2020-03-18 DIAGNOSIS — Z13.220 ENCOUNTER FOR LIPID SCREENING FOR CARDIOVASCULAR DISEASE: ICD-10-CM

## 2020-03-18 DIAGNOSIS — E78.5 HYPERLIPIDEMIA, UNSPECIFIED HYPERLIPIDEMIA TYPE: ICD-10-CM

## 2020-03-18 DIAGNOSIS — E11.621 TYPE 2 DIABETES MELLITUS WITH FOOT ULCER, WITHOUT LONG-TERM CURRENT USE OF INSULIN (HCC): ICD-10-CM

## 2020-03-18 DIAGNOSIS — F32.A ANXIETY AND DEPRESSION: ICD-10-CM

## 2020-03-18 DIAGNOSIS — Z91.81 AT RISK FOR FALLING: ICD-10-CM

## 2020-03-18 DIAGNOSIS — E11.51 DIABETES TYPE 2 WITH ATHEROSCLEROSIS OF ARTERIES OF EXTREMITIES (HCC): ICD-10-CM

## 2020-03-18 DIAGNOSIS — M23.92 INTERNAL DERANGEMENT OF BOTH KNEES: ICD-10-CM

## 2020-03-18 DIAGNOSIS — K92.2 GASTROINTESTINAL HEMORRHAGE, UNSPECIFIED GASTROINTESTINAL HEMORRHAGE TYPE: ICD-10-CM

## 2020-03-18 DIAGNOSIS — H25.9 SENILE CATARACT, UNSPECIFIED AGE-RELATED CATARACT TYPE, UNSPECIFIED LATERALITY: ICD-10-CM

## 2020-03-18 DIAGNOSIS — Z13.6 ENCOUNTER FOR LIPID SCREENING FOR CARDIOVASCULAR DISEASE: ICD-10-CM

## 2020-03-18 DIAGNOSIS — E11.42 DIABETIC POLYNEUROPATHY ASSOCIATED WITH TYPE 2 DIABETES MELLITUS (HCC): ICD-10-CM

## 2020-03-18 DIAGNOSIS — N28.9 ACUTE RENAL INSUFFICIENCY: ICD-10-CM

## 2020-03-18 DIAGNOSIS — I10 ESSENTIAL HYPERTENSION: ICD-10-CM

## 2020-03-18 DIAGNOSIS — C61 MALIGNANT NEOPLASM OF PROSTATE (HCC): ICD-10-CM

## 2020-03-18 DIAGNOSIS — N39.3 MALE STRESS INCONTINENCE: ICD-10-CM

## 2020-03-18 DIAGNOSIS — I70.209 DIABETES TYPE 2 WITH ATHEROSCLEROSIS OF ARTERIES OF EXTREMITIES (HCC): ICD-10-CM

## 2020-03-18 DIAGNOSIS — I80.9 PHLEBITIS AND THROMBOPHLEBITIS: ICD-10-CM

## 2020-03-18 DIAGNOSIS — I25.10 CAD IN NATIVE ARTERY: ICD-10-CM

## 2020-03-18 DIAGNOSIS — Z00.00 MEDICARE ANNUAL WELLNESS VISIT, SUBSEQUENT: Primary | ICD-10-CM

## 2020-03-18 DIAGNOSIS — K21.9 GASTROESOPHAGEAL REFLUX DISEASE WITHOUT ESOPHAGITIS: ICD-10-CM

## 2020-03-18 DIAGNOSIS — L97.509 TYPE 2 DIABETES MELLITUS WITH FOOT ULCER, WITHOUT LONG-TERM CURRENT USE OF INSULIN (HCC): ICD-10-CM

## 2020-03-18 DIAGNOSIS — F41.9 ANXIETY AND DEPRESSION: ICD-10-CM

## 2020-03-18 DIAGNOSIS — M23.91 INTERNAL DERANGEMENT OF BOTH KNEES: ICD-10-CM

## 2020-03-18 LAB
ALBUMIN SERPL-MCNC: 3.5 G/DL (ref 3.4–5)
ALBUMIN/GLOB SERPL: 1 {RATIO} (ref 1–2)
ALP LIVER SERPL-CCNC: 104 U/L (ref 45–117)
ALT SERPL-CCNC: 39 U/L (ref 16–61)
ANION GAP SERPL CALC-SCNC: 4 MMOL/L (ref 0–18)
AST SERPL-CCNC: 23 U/L (ref 15–37)
BASOPHILS # BLD AUTO: 0.04 X10(3) UL (ref 0–0.2)
BASOPHILS NFR BLD AUTO: 0.5 %
BILIRUB SERPL-MCNC: 0.8 MG/DL (ref 0.1–2)
BUN BLD-MCNC: 18 MG/DL (ref 7–18)
BUN/CREAT SERPL: 19.1 (ref 10–20)
CALCIUM BLD-MCNC: 8.5 MG/DL (ref 8.5–10.1)
CHLORIDE SERPL-SCNC: 110 MMOL/L (ref 98–112)
CHOLEST SMN-MCNC: 168 MG/DL (ref ?–200)
CO2 SERPL-SCNC: 25 MMOL/L (ref 21–32)
CREAT BLD-MCNC: 0.94 MG/DL (ref 0.7–1.3)
DEPRECATED RDW RBC AUTO: 41 FL (ref 35.1–46.3)
EOSINOPHIL # BLD AUTO: 0.21 X10(3) UL (ref 0–0.7)
EOSINOPHIL NFR BLD AUTO: 2.7 %
ERYTHROCYTE [DISTWIDTH] IN BLOOD BY AUTOMATED COUNT: 12.6 % (ref 11–15)
EST. AVERAGE GLUCOSE BLD GHB EST-MCNC: 137 MG/DL (ref 68–126)
GLOBULIN PLAS-MCNC: 3.6 G/DL (ref 2.8–4.4)
GLUCOSE BLD-MCNC: 153 MG/DL (ref 70–99)
HBA1C MFR BLD HPLC: 6.4 % (ref ?–5.7)
HCT VFR BLD AUTO: 45.4 % (ref 39–53)
HDLC SERPL-MCNC: 48 MG/DL (ref 40–59)
HGB BLD-MCNC: 15.2 G/DL (ref 13–17.5)
IMM GRANULOCYTES # BLD AUTO: 0.03 X10(3) UL (ref 0–1)
IMM GRANULOCYTES NFR BLD: 0.4 %
LDLC SERPL CALC-MCNC: 54 MG/DL (ref ?–100)
LYMPHOCYTES # BLD AUTO: 1.61 X10(3) UL (ref 1–4)
LYMPHOCYTES NFR BLD AUTO: 20.8 %
M PROTEIN MFR SERPL ELPH: 7.1 G/DL (ref 6.4–8.2)
MCH RBC QN AUTO: 30.2 PG (ref 26–34)
MCHC RBC AUTO-ENTMCNC: 33.5 G/DL (ref 31–37)
MCV RBC AUTO: 90.1 FL (ref 80–100)
MONOCYTES # BLD AUTO: 0.75 X10(3) UL (ref 0.1–1)
MONOCYTES NFR BLD AUTO: 9.7 %
NEUTROPHILS # BLD AUTO: 5.11 X10 (3) UL (ref 1.5–7.7)
NEUTROPHILS # BLD AUTO: 5.11 X10(3) UL (ref 1.5–7.7)
NEUTROPHILS NFR BLD AUTO: 65.9 %
NONHDLC SERPL-MCNC: 120 MG/DL (ref ?–130)
OSMOLALITY SERPL CALC.SUM OF ELEC: 293 MOSM/KG (ref 275–295)
PATIENT FASTING Y/N/NP: NO
PATIENT FASTING Y/N/NP: NO
PLATELET # BLD AUTO: 260 10(3)UL (ref 150–450)
POTASSIUM SERPL-SCNC: 4 MMOL/L (ref 3.5–5.1)
RBC # BLD AUTO: 5.04 X10(6)UL (ref 3.8–5.8)
SODIUM SERPL-SCNC: 139 MMOL/L (ref 136–145)
TRIGL SERPL-MCNC: 329 MG/DL (ref 30–149)
TSI SER-ACNC: 1.14 MIU/ML (ref 0.36–3.74)
VLDLC SERPL CALC-MCNC: 66 MG/DL (ref 0–30)
WBC # BLD AUTO: 7.8 X10(3) UL (ref 4–11)

## 2020-03-18 PROCEDURE — 36415 COLL VENOUS BLD VENIPUNCTURE: CPT

## 2020-03-18 PROCEDURE — 80061 LIPID PANEL: CPT

## 2020-03-18 PROCEDURE — 80053 COMPREHEN METABOLIC PANEL: CPT

## 2020-03-18 PROCEDURE — G0439 PPPS, SUBSEQ VISIT: HCPCS | Performed by: FAMILY MEDICINE

## 2020-03-18 PROCEDURE — 85025 COMPLETE CBC W/AUTO DIFF WBC: CPT

## 2020-03-18 PROCEDURE — 84443 ASSAY THYROID STIM HORMONE: CPT

## 2020-03-18 PROCEDURE — 83036 HEMOGLOBIN GLYCOSYLATED A1C: CPT

## 2020-03-18 NOTE — TELEPHONE ENCOUNTER
Pt called back states he is taking Furosemide 20 mg BID/total of 4/day. Pt confused about what his new dose should be he stated he was just here today. Pt also states he needs refill. Please advise.

## 2020-03-18 NOTE — TELEPHONE ENCOUNTER
Pt does not have some furosimide - pls let Dr. Jose Liriano know he will restart this med as directed

## 2020-03-18 NOTE — PROGRESS NOTES
Patient presents for a annual Medicare wellness visit. He is well-known to my practice suffers from type 2 diabetes with minimal complications. We will obtain a timely and up-to-date A1c level today.     Osmani Whiting is 80years old still drives lost his wife abou

## 2020-03-19 PROBLEM — I70.0 AORTIC ATHEROSCLEROSIS (HCC): Status: RESOLVED | Noted: 2018-12-25 | Resolved: 2020-03-19

## 2020-03-19 PROBLEM — K92.2 GASTROINTESTINAL HEMORRHAGE, UNSPECIFIED GASTROINTESTINAL HEMORRHAGE TYPE: Status: RESOLVED | Noted: 2017-04-29 | Resolved: 2020-03-19

## 2020-03-19 PROBLEM — K62.5 RECTAL BLEEDING: Status: RESOLVED | Noted: 2019-11-04 | Resolved: 2020-03-19

## 2020-03-19 PROBLEM — M86.9 PYOGENIC INFLAMMATION OF BONE (HCC): Status: RESOLVED | Noted: 2019-12-06 | Resolved: 2020-03-19

## 2020-03-19 PROBLEM — Z89.421 HISTORY OF PARTIAL AMPUTATION OF TOE OF RIGHT FOOT (HCC): Status: RESOLVED | Noted: 2020-01-15 | Resolved: 2020-03-19

## 2020-03-19 PROBLEM — R73.9 HYPERGLYCEMIA: Status: RESOLVED | Noted: 2019-10-27 | Resolved: 2020-03-19

## 2020-03-19 PROBLEM — N39.3 STRESS INCONTINENCE, MALE: Status: RESOLVED | Noted: 2019-05-16 | Resolved: 2020-03-19

## 2020-03-19 PROBLEM — M47.819 ARTHRITIS OF LOW BACK: Status: RESOLVED | Noted: 2018-12-25 | Resolved: 2020-03-19

## 2020-03-19 RX ORDER — FUROSEMIDE 20 MG/1
TABLET ORAL
Qty: 270 TABLET | Refills: 1 | Status: SHIPPED | OUTPATIENT
Start: 2020-03-19 | End: 2020-05-22

## 2020-03-20 NOTE — PROGRESS NOTES
Chris Sherman REASON FOR VISIT:    Candido Gibbs is a 80year old male who presents for a Medicare Annual Wellness visit.          Patient Care Team: Patient Care Team:  Linda Bolton DO as PCP - General (Family Practice)  Linda Bolton DO as PCP - [facility]    Florina Melendez mouth. Take two capsules daily     • Magnesium 100 MG Oral Tab Take 250 mg by mouth daily.  Take two tablets daily      • furosemide 20 MG Oral Tab TAKE TWO TABLETS BY MOUTH TWICE DAILY 270 tablet 1       Glucose and HbA1c Latest Ref Rng & Units 3/18/2020 1 health state?: Good  How do you maintain positive mental well-being?: Visiting Family  If you are a male age 38-65 or a female age 47-67, do you take aspirin?: Yes  Have you had any immunizations at another office such as Influenza, Hepatitis B, Tetanus, o INDICATIONS AND SCHEDULE Internal Lab or Procedure   Diabetes Screening     HbgA1C   Annually HGBA1C (%)   Date Value   02/21/2014 6.7 (H)     HgbA1C (%)   Date Value   03/18/2020 6.4 (H)       Fasting Blood Sugar (FSB)Annually Glucose (mg/dL)   Date Dianne exam  Annually Data entered on: 2/3/2015   Last Dilated Eye Exam 2/3/2015              ALLERGIES:     Ace Inhibitors          Coughing  Augmentin [Amoxicil*    DIARRHEA  MEDICAL INFORMATION:   Past Medical History:   Diagnosis Date   • Accelerated hyperten neoplasm of prostate    • Polymyalgia ([facility] [address]     rheumatica   • [facility] 12/6/2019   • Rectal bleeding 11/4/2019   • Rhabdomyolysis    • Rotator cuff sprain    • Severe sepsis [facility])    • Stress incontinence, male 5/16/2019   • Typ 10/13/2017      FLUZONE 3 Yrs+ Quad Prsv Free 0.5 ml (18958)                          11/08/2016      Influenza             11/04/2005  10/07/2009  11/01/2010                            10/25/2011      Pneumococcal (Prevnar 13)                          12/ falling    Gastrointestinal hemorrhage, unspecified gastrointestinal hemorrhage type    Male stress incontinence    Acute renal insufficiency    Phlebitis and thrombophlebitis    CAD in native artery    Internal derangement of both knees    Gastroesophagea

## 2020-04-14 ENCOUNTER — VIRTUAL PHONE E/M (OUTPATIENT)
Dept: FAMILY MEDICINE CLINIC | Facility: CLINIC | Age: 85
End: 2020-04-14
Payer: MEDICARE

## 2020-04-14 DIAGNOSIS — I70.209 DIABETES TYPE 2 WITH ATHEROSCLEROSIS OF ARTERIES OF EXTREMITIES (HCC): Primary | ICD-10-CM

## 2020-04-14 DIAGNOSIS — E11.51 DIABETES TYPE 2 WITH ATHEROSCLEROSIS OF ARTERIES OF EXTREMITIES (HCC): Primary | ICD-10-CM

## 2020-04-14 PROCEDURE — 99442 PHONE E/M BY PHYS 11-20 MIN: CPT | Performed by: FAMILY MEDICINE

## 2020-04-14 NOTE — PROGRESS NOTES
Virtual Telephone Check-In    Nisahntjuan Sheldon verbally consents to a Virtual/Telephone Check-In visit on 04/14/20. Patient understands and accepts financial responsibility for any deductible, co-insurance and/or co-pays associated with this service.     Du

## 2020-05-04 ENCOUNTER — VIRTUAL PHONE E/M (OUTPATIENT)
Dept: FAMILY MEDICINE CLINIC | Facility: CLINIC | Age: 85
End: 2020-05-04
Payer: MEDICARE

## 2020-05-04 ENCOUNTER — TELEPHONE (OUTPATIENT)
Dept: FAMILY MEDICINE CLINIC | Facility: CLINIC | Age: 85
End: 2020-05-04

## 2020-05-04 ENCOUNTER — PATIENT MESSAGE (OUTPATIENT)
Dept: FAMILY MEDICINE CLINIC | Facility: CLINIC | Age: 85
End: 2020-05-04

## 2020-05-04 DIAGNOSIS — L02.419 CELLULITIS AND ABSCESS OF LEG: Primary | ICD-10-CM

## 2020-05-04 DIAGNOSIS — L03.119 CELLULITIS AND ABSCESS OF LEG: Primary | ICD-10-CM

## 2020-05-04 RX ORDER — CEPHALEXIN 500 MG/1
500 CAPSULE ORAL 2 TIMES DAILY
Qty: 20 CAPSULE | Refills: 0 | Status: SHIPPED | OUTPATIENT
Start: 2020-05-04 | End: 2020-05-14

## 2020-05-04 NOTE — TELEPHONE ENCOUNTER
Per Dr. Karen Eng pt to come in tomorrow for a wound check. Rx keflex 500mg BID x 10 days. Rx sent to pharmacy and appt scheduled. Pt verbalized understanding.

## 2020-05-04 NOTE — TELEPHONE ENCOUNTER
From: Alexsandra Shelton  To: Héctor Huerta DO  Sent: 5/4/2020 10:19 AM CDT  Subject: Other    These are the pictures of Maida Castellano's legs, taken on 05/03/2020

## 2020-05-05 ENCOUNTER — OFFICE VISIT (OUTPATIENT)
Dept: FAMILY MEDICINE CLINIC | Facility: CLINIC | Age: 85
End: 2020-05-05
Payer: MEDICARE

## 2020-05-05 VITALS
DIASTOLIC BLOOD PRESSURE: 60 MMHG | HEART RATE: 78 BPM | OXYGEN SATURATION: 98 % | SYSTOLIC BLOOD PRESSURE: 130 MMHG | RESPIRATION RATE: 18 BRPM

## 2020-05-05 DIAGNOSIS — L03.119 CELLULITIS AND ABSCESS OF LEG: Primary | ICD-10-CM

## 2020-05-05 DIAGNOSIS — L02.419 CELLULITIS AND ABSCESS OF LEG: Primary | ICD-10-CM

## 2020-05-05 PROCEDURE — 99214 OFFICE O/P EST MOD 30 MIN: CPT | Performed by: FAMILY MEDICINE

## 2020-05-05 NOTE — PROGRESS NOTES
Patient is well-known to my practice. He is a type I diabetic with fair control.   He has been dealing with recurrent lower extremity edema from lymphatic and venous insufficiency and from recent trauma that caused him to develop numerous tears in the skin

## 2020-05-11 ENCOUNTER — OFFICE VISIT (OUTPATIENT)
Dept: FAMILY MEDICINE CLINIC | Facility: CLINIC | Age: 85
End: 2020-05-11
Payer: MEDICARE

## 2020-05-11 VITALS — OXYGEN SATURATION: 98 % | HEART RATE: 90 BPM | DIASTOLIC BLOOD PRESSURE: 52 MMHG | SYSTOLIC BLOOD PRESSURE: 140 MMHG

## 2020-05-11 DIAGNOSIS — L03.119 CELLULITIS AND ABSCESS OF LEG: Primary | ICD-10-CM

## 2020-05-11 DIAGNOSIS — L02.419 CELLULITIS AND ABSCESS OF LEG: Primary | ICD-10-CM

## 2020-05-11 PROBLEM — L03.116 CELLULITIS AND ABSCESS OF LEFT LOWER EXTREMITY: Status: ACTIVE | Noted: 2020-05-11

## 2020-05-11 PROBLEM — L02.416 CELLULITIS AND ABSCESS OF LEFT LOWER EXTREMITY: Status: ACTIVE | Noted: 2020-05-11

## 2020-05-11 PROCEDURE — 99213 OFFICE O/P EST LOW 20 MIN: CPT | Performed by: FAMILY MEDICINE

## 2020-05-11 NOTE — PROGRESS NOTES
A gentleman, 80years old, here for follow-up of bilateral lower leg edema with cellulitic changes.   He has been tolerating the Keflex quite nicely and feels much better his weight has remained stable no calf tenderness no chest pain or shortness of breath

## 2020-05-19 ENCOUNTER — HOSPITAL ENCOUNTER (EMERGENCY)
Facility: HOSPITAL | Age: 85
Discharge: HOME | End: 2020-05-19
Attending: EMERGENCY MEDICINE
Payer: MEDICARE

## 2020-05-19 ENCOUNTER — TELEPHONE (OUTPATIENT)
Dept: CARDIOLOGY | Age: 85
End: 2020-05-19

## 2020-05-19 ENCOUNTER — ANCILLARY PROCEDURE (OUTPATIENT)
Dept: CARDIOLOGY | Age: 85
End: 2020-05-19
Attending: INTERNAL MEDICINE

## 2020-05-19 ENCOUNTER — APPOINTMENT (OUTPATIENT)
Dept: GENERAL RADIOLOGY | Facility: HOSPITAL | Age: 85
End: 2020-05-19
Attending: EMERGENCY MEDICINE
Payer: MEDICARE

## 2020-05-19 ENCOUNTER — APPOINTMENT (OUTPATIENT)
Dept: CV DIAGNOSTICS | Facility: HOSPITAL | Age: 85
End: 2020-05-19
Attending: EMERGENCY MEDICINE
Payer: MEDICARE

## 2020-05-19 VITALS
DIASTOLIC BLOOD PRESSURE: 72 MMHG | RESPIRATION RATE: 20 BRPM | TEMPERATURE: 99 F | OXYGEN SATURATION: 95 % | WEIGHT: 180 LBS | SYSTOLIC BLOOD PRESSURE: 161 MMHG | HEART RATE: 65 BPM | BODY MASS INDEX: 28 KG/M2

## 2020-05-19 DIAGNOSIS — R42 DIZZINESS: ICD-10-CM

## 2020-05-19 DIAGNOSIS — R06.09 DYSPNEA ON EXERTION: ICD-10-CM

## 2020-05-19 DIAGNOSIS — R42 DIZZINESS: Primary | ICD-10-CM

## 2020-05-19 DIAGNOSIS — R42 LIGHTHEADEDNESS: Primary | ICD-10-CM

## 2020-05-19 PROCEDURE — 99215 OFFICE O/P EST HI 40 MIN: CPT | Performed by: INTERNAL MEDICINE

## 2020-05-19 PROCEDURE — 83880 ASSAY OF NATRIURETIC PEPTIDE: CPT | Performed by: EMERGENCY MEDICINE

## 2020-05-19 PROCEDURE — 71045 X-RAY EXAM CHEST 1 VIEW: CPT | Performed by: EMERGENCY MEDICINE

## 2020-05-19 PROCEDURE — 99285 EMERGENCY DEPT VISIT HI MDM: CPT

## 2020-05-19 PROCEDURE — 85379 FIBRIN DEGRADATION QUANT: CPT | Performed by: EMERGENCY MEDICINE

## 2020-05-19 PROCEDURE — 93010 ELECTROCARDIOGRAM REPORT: CPT

## 2020-05-19 PROCEDURE — 85025 COMPLETE CBC W/AUTO DIFF WBC: CPT | Performed by: EMERGENCY MEDICINE

## 2020-05-19 PROCEDURE — 93306 TTE W/DOPPLER COMPLETE: CPT | Performed by: EMERGENCY MEDICINE

## 2020-05-19 PROCEDURE — 81003 URINALYSIS AUTO W/O SCOPE: CPT | Performed by: EMERGENCY MEDICINE

## 2020-05-19 PROCEDURE — 84484 ASSAY OF TROPONIN QUANT: CPT | Performed by: EMERGENCY MEDICINE

## 2020-05-19 PROCEDURE — 36415 COLL VENOUS BLD VENIPUNCTURE: CPT

## 2020-05-19 PROCEDURE — 80053 COMPREHEN METABOLIC PANEL: CPT | Performed by: EMERGENCY MEDICINE

## 2020-05-19 PROCEDURE — 93005 ELECTROCARDIOGRAM TRACING: CPT

## 2020-05-19 NOTE — ED PROVIDER NOTES
Patient Seen in: [facility] Emergency Department      History   Patient presents with:  Dyspnea VENKATA SOB    Stated Complaint: SOB     HPI    51-year-old male comes to the hospital with a complaint of feeling exertional shortness of breath and lighthe • Hypertension 1/17/2013   • Hypertension complications 8/05/0161   • Hypertonicity of bladder    • Intervertebral lumbar disc disorder with myelopathy, lumbar region    • Lesion of plantar nerve 6/29/2012   • Neuropathy    • Nocturnal leg cramps 11/7/20 Systems    Positive for stated complaint: SOB   Other systems are as noted in HPI. Constitutional and vital signs reviewed. All other systems reviewed and negative except as noted above.     Physical Exam     ED Triage Vitals   BP 05/19/20 0602 159/83 RAINBOW DRAW GOLD   RAINBOW DRAW LIGHT GREEN   RAINBOW DRAW LAVENDER   CBC W/ DIFFERENTIAL     EKG    Rate, intervals and axes as noted on EKG Report.   Rate: 65  Rhythm: Sinus Rhythm  Reading: Agreed              Xr Chest Ap Portable  (cpt=13763)    Resu

## 2020-05-19 NOTE — ED INITIAL ASSESSMENT (HPI)
89YM c/c of SOB Pt state that he was awoken tonight with SOB and feeling light headed Pt state that his SOB and dizzy has improved

## 2020-05-21 PROCEDURE — 93227 XTRNL ECG REC<48 HR R&I: CPT | Performed by: INTERNAL MEDICINE

## 2020-05-22 ENCOUNTER — OFFICE VISIT (OUTPATIENT)
Dept: FAMILY MEDICINE CLINIC | Facility: CLINIC | Age: 85
End: 2020-05-22
Payer: MEDICARE

## 2020-05-22 ENCOUNTER — TELEPHONE (OUTPATIENT)
Dept: FAMILY MEDICINE CLINIC | Facility: CLINIC | Age: 85
End: 2020-05-22

## 2020-05-22 ENCOUNTER — TELEPHONE (OUTPATIENT)
Dept: CARDIOLOGY | Age: 85
End: 2020-05-22

## 2020-05-22 VITALS
WEIGHT: 175 LBS | OXYGEN SATURATION: 98 % | DIASTOLIC BLOOD PRESSURE: 62 MMHG | RESPIRATION RATE: 18 BRPM | HEART RATE: 70 BPM | BODY MASS INDEX: 27 KG/M2 | SYSTOLIC BLOOD PRESSURE: 118 MMHG | TEMPERATURE: 98 F

## 2020-05-22 DIAGNOSIS — I87.2 VENOUS INSUFFICIENCY OF BOTH LOWER EXTREMITIES: Primary | ICD-10-CM

## 2020-05-22 PROCEDURE — 99213 OFFICE O/P EST LOW 20 MIN: CPT | Performed by: FAMILY MEDICINE

## 2020-05-22 PROCEDURE — 3074F SYST BP LT 130 MM HG: CPT | Performed by: FAMILY MEDICINE

## 2020-05-22 PROCEDURE — 3078F DIAST BP <80 MM HG: CPT | Performed by: FAMILY MEDICINE

## 2020-05-22 PROCEDURE — 1111F DSCHRG MED/CURRENT MED MERGE: CPT | Performed by: FAMILY MEDICINE

## 2020-05-22 RX ORDER — FUROSEMIDE 20 MG/1
TABLET ORAL
Qty: 270 TABLET | Refills: 1 | COMMUNITY
Start: 2020-05-22 | End: 2020-10-07

## 2020-05-22 RX ORDER — POTASSIUM CHLORIDE 1500 MG/1
20 TABLET, FILM COATED, EXTENDED RELEASE ORAL 2 TIMES DAILY
Qty: 60 TABLET | Refills: 1 | Status: SHIPPED | OUTPATIENT
Start: 2020-05-22 | End: 2020-07-21

## 2020-05-22 NOTE — TELEPHONE ENCOUNTER
Dr. Liisa Boas would like us to get a list of patients medications and then let Dr. Liisa Boas know.   He spoke with patient today

## 2020-05-22 NOTE — PROGRESS NOTES
Presents today for final visit regarding his venous lymphatic insufficiency causing him to have marked bilateral leg and ankle and foot edema.   We had him on slowly increasing doses of furosemide and K-Dur and he has indeed lost a great deal of untoward fl

## 2020-05-22 NOTE — TELEPHONE ENCOUNTER
Spoke to patient regarding medication changes, Furosemide 20 mg , 1 tablet twice daily. Kdur 20 Meq one tablet twice daily. Rx for [address] sent to pharmacy.

## 2020-05-27 RX ORDER — POTASSIUM CHLORIDE 1500 MG/1
TABLET, EXTENDED RELEASE ORAL
COMMUNITY
Start: 2020-05-22

## 2020-05-28 PROBLEM — Z09 HOSPITAL DISCHARGE FOLLOW-UP: Status: ACTIVE | Noted: 2020-05-28

## 2020-05-29 ENCOUNTER — OFFICE VISIT (OUTPATIENT)
Dept: CARDIOLOGY | Age: 85
End: 2020-05-29

## 2020-05-29 VITALS
DIASTOLIC BLOOD PRESSURE: 58 MMHG | HEART RATE: 90 BPM | WEIGHT: 172 LBS | BODY MASS INDEX: 26.15 KG/M2 | SYSTOLIC BLOOD PRESSURE: 124 MMHG

## 2020-05-29 DIAGNOSIS — Z09 HOSPITAL DISCHARGE FOLLOW-UP: ICD-10-CM

## 2020-05-29 DIAGNOSIS — I10 ESSENTIAL HYPERTENSION: Primary | ICD-10-CM

## 2020-05-29 DIAGNOSIS — I65.23 ASYMPTOMATIC CAROTID ARTERY STENOSIS, BILATERAL: ICD-10-CM

## 2020-05-29 DIAGNOSIS — E78.00 PURE HYPERCHOLESTEROLEMIA: ICD-10-CM

## 2020-05-29 DIAGNOSIS — I25.10 CORONARY ARTERY DISEASE INVOLVING NATIVE CORONARY ARTERY OF NATIVE HEART WITHOUT ANGINA PECTORIS: ICD-10-CM

## 2020-05-29 DIAGNOSIS — Z95.1 HX OF CABG: ICD-10-CM

## 2020-05-29 DIAGNOSIS — R00.1 BRADYCARDIA, UNSPECIFIED: ICD-10-CM

## 2020-05-29 PROCEDURE — 99214 OFFICE O/P EST MOD 30 MIN: CPT | Performed by: NURSE PRACTITIONER

## 2020-05-29 ASSESSMENT — ENCOUNTER SYMPTOMS
COUGH: 0
FEVER: 0
SHORTNESS OF BREATH: 0
ORTHOPNEA: 0
ABDOMINAL PAIN: 0
SYNCOPE: 0
NIGHT SWEATS: 0
NEAR-SYNCOPE: 0
BLOATING: 0

## 2020-06-10 ENCOUNTER — OFFICE VISIT (OUTPATIENT)
Dept: PODIATRY CLINIC | Facility: CLINIC | Age: 85
End: 2020-06-10
Payer: MEDICARE

## 2020-06-10 VITALS — TEMPERATURE: 97 F

## 2020-06-10 DIAGNOSIS — B35.1 ONYCHOMYCOSIS: ICD-10-CM

## 2020-06-10 DIAGNOSIS — M21.6X1 PLANTAR FLEXED METATARSAL BONE OF RIGHT FOOT: ICD-10-CM

## 2020-06-10 DIAGNOSIS — M21.6X2 PLANTAR FLEXED METATARSAL BONE OF LEFT FOOT: Primary | ICD-10-CM

## 2020-06-10 DIAGNOSIS — M20.41 HAMMER TOES OF BOTH FEET: ICD-10-CM

## 2020-06-10 DIAGNOSIS — L84 HELOMA DURUM: ICD-10-CM

## 2020-06-10 DIAGNOSIS — M20.42 HAMMER TOES OF BOTH FEET: ICD-10-CM

## 2020-06-10 DIAGNOSIS — L84 CALLUS OF FOOT: ICD-10-CM

## 2020-06-10 DIAGNOSIS — E11.42 DIABETIC POLYNEUROPATHY ASSOCIATED WITH TYPE 2 DIABETES MELLITUS (HCC): ICD-10-CM

## 2020-06-10 DIAGNOSIS — Z89.421 HISTORY OF PARTIAL AMPUTATION OF TOE OF RIGHT FOOT (HCC): ICD-10-CM

## 2020-06-10 PROCEDURE — 11721 DEBRIDE NAIL 6 OR MORE: CPT | Performed by: PODIATRIST

## 2020-06-10 PROCEDURE — 11056 PARNG/CUTG B9 HYPRKR LES 2-4: CPT | Performed by: PODIATRIST

## 2020-06-11 NOTE — PROGRESS NOTES
Patient returns in clinic complaining of painful calluses  Shahla Calero is a 80year old male. Patient presents with:   Follow - Up: Pt states he is here to follow up         HPI:   Pleasant gentleman who is well-known to me in a former patient presents to (TYLENOL 8 HOUR) 650 MG Oral Tab CR Take 650 mg by mouth every 8 (eight) hours as needed for Pain.         Past Medical History:   Diagnosis Date   • Accelerated hypertension 6/19/2014   • Acute colitis 2/27/2016   • Aortic atherosclerosis ([facility] [address] 12/25/2018 ([facility] 12/6/2019   • Rectal bleeding 11/4/2019   • Rhabdomyolysis    • Rotator cuff sprain    • Severe sepsis (HCC)    • Stress incontinence, male 5/16/2019   • Type 2 acromioclavicular joint separation 5/20/2013   • Type II or unspecified type diabetes talat Concerns:        Caffeine Concern: Yes        Exercise: Yes          REVIEW OF SYSTEMS:   Review of Systems  GENERAL HEALTH: feels well otherwise  SKIN: denies any unusual skin lesions or rashes  RESPIRATORY: denies shortness of breath with exertion  CARD debrided all toenails as documented above this was a total of 10 toenails. Debrided them as far down to healthy tissue as possible and using a 15 blade trimmed and debrided all hyperkeratoses uneventfully alleviating the patient's painful symptoms.     The

## 2020-06-12 NOTE — PROGRESS NOTES
HPI:    Patient ID: Rasheeda Farah is a 80year old male. Longstanding edema from lymphatic and venous insufficiency also longstanding type I and type 2 diabetes. He is not a 80year-old gentleman living by himself trying to make ends meet.   He is perfecto Acids (FISH OIL) 1000 MG Oral Cap Take  by mouth. Take two capsules daily     • Magnesium 100 MG Oral Tab Take 250 mg by mouth daily. Take two tablets daily        Allergies:   Ace Inhibitors          Coughing  Augmentin [Amoxicil*    DIARRHEA   PHYSICAL EX Additional Notes: Patient consent was obtained to proceed with the visit and recommended plan of care after discussion of all risks and benefits, including the risks of COVID-19 exposure. Detail Level: Simple

## 2020-07-06 RX ORDER — LOSARTAN POTASSIUM 100 MG/1
100 TABLET ORAL
Qty: 90 TABLET | Refills: 3 | Status: SHIPPED | OUTPATIENT
Start: 2020-07-06 | End: 2021-08-25

## 2020-07-07 RX ORDER — LANCETS
EACH MISCELLANEOUS
Qty: 200 EACH | Refills: 3 | Status: SHIPPED | OUTPATIENT
Start: 2020-07-07 | End: 2020-07-13

## 2020-07-13 ENCOUNTER — TELEPHONE (OUTPATIENT)
Dept: FAMILY MEDICINE CLINIC | Facility: CLINIC | Age: 85
End: 2020-07-13

## 2020-07-13 DIAGNOSIS — E11.621 TYPE 2 DIABETES MELLITUS WITH FOOT ULCER, WITHOUT LONG-TERM CURRENT USE OF INSULIN (HCC): ICD-10-CM

## 2020-07-13 DIAGNOSIS — L97.509 TYPE 2 DIABETES MELLITUS WITH FOOT ULCER, WITHOUT LONG-TERM CURRENT USE OF INSULIN (HCC): Primary | ICD-10-CM

## 2020-07-13 DIAGNOSIS — E11.621 TYPE 2 DIABETES MELLITUS WITH FOOT ULCER, WITHOUT LONG-TERM CURRENT USE OF INSULIN (HCC): Primary | ICD-10-CM

## 2020-07-13 DIAGNOSIS — L97.509 TYPE 2 DIABETES MELLITUS WITH FOOT ULCER, WITHOUT LONG-TERM CURRENT USE OF INSULIN (HCC): ICD-10-CM

## 2020-07-13 RX ORDER — LANCETS
EACH MISCELLANEOUS
Qty: 200 EACH | Refills: 3 | Status: SHIPPED | OUTPATIENT
Start: 2020-07-13 | End: 2020-07-14

## 2020-07-13 NOTE — TELEPHONE ENCOUNTER
Pt needs refill of test strips and lancett    They both need correct icd codes so they are medicare compliant

## 2020-07-14 RX ORDER — LANCETS
EACH MISCELLANEOUS
Qty: 200 EACH | Refills: 3 | Status: SHIPPED | OUTPATIENT
Start: 2020-07-14 | End: 2021-03-23

## 2020-07-14 RX ORDER — BLOOD SUGAR DIAGNOSTIC
STRIP MISCELLANEOUS
Qty: 100 STRIP | Refills: 1 | Status: SHIPPED | OUTPATIENT
Start: 2020-07-14 | End: 2020-07-14

## 2020-07-14 RX ORDER — LANCETS
EACH MISCELLANEOUS
Qty: 200 EACH | Refills: 3 | Status: SHIPPED | OUTPATIENT
Start: 2020-07-14 | End: 2020-07-14

## 2020-07-14 RX ORDER — BLOOD SUGAR DIAGNOSTIC
STRIP MISCELLANEOUS
Qty: 100 STRIP | Refills: 1 | Status: SHIPPED | OUTPATIENT
Start: 2020-07-14 | End: 2020-10-26

## 2020-07-14 NOTE — TELEPHONE ENCOUNTER
PLEASE CALL [facility] REGARDING LANCETS AND TEST STRIPS HE DOES NOT WANT ANOTHER RX SENT
Per pharmacy, need to resent RX with dx code for medicare.  rx resent
no

## 2020-07-16 ENCOUNTER — TELEPHONE (OUTPATIENT)
Dept: FAMILY MEDICINE CLINIC | Facility: CLINIC | Age: 85
End: 2020-07-16

## 2020-07-16 NOTE — TELEPHONE ENCOUNTER
Needs to discuss \"lancet coto\" need - Pt said he's been waiting 4 days to get this done - pls call asap

## 2020-07-20 ENCOUNTER — VIRTUAL PHONE E/M (OUTPATIENT)
Dept: FAMILY MEDICINE CLINIC | Facility: CLINIC | Age: 85
End: 2020-07-20
Payer: MEDICARE

## 2020-07-20 ENCOUNTER — PATIENT MESSAGE (OUTPATIENT)
Dept: FAMILY MEDICINE CLINIC | Facility: CLINIC | Age: 85
End: 2020-07-20

## 2020-07-20 DIAGNOSIS — T14.8XXA BLISTER: Primary | ICD-10-CM

## 2020-07-20 PROCEDURE — 99441 PHONE E/M BY PHYS 5-10 MIN: CPT | Performed by: FAMILY MEDICINE

## 2020-07-20 NOTE — TELEPHONE ENCOUNTER
From: Carlyn Hernandez  To: Felix Barone DO  Sent: 7/20/2020 8:55 AM CDT  Subject: Non-Urgent Medical Question    This is Russell Hale son She Beyer. I have attached pictures of my Dad's leg. What do you recommend that he do? Office visit? Prescription?

## 2020-07-20 NOTE — PROGRESS NOTES
Virtual Telephone Check-In    Dany Jimenez verbally consents to a Virtual/Telephone Check-In visit on 07/20/20. Patient understands and accepts financial responsibility for any deductible, co-insurance and/or co-pays associated with this service. Hypertonicity of bladder    • Intervertebral lumbar disc disorder with myelopathy, lumbar region    • Lesion of plantar nerve 6/29/2012   • Neuropathy    • Nocturnal leg cramps 11/7/2013   • Other abnormal blood chemistry    • Other and unspecified hyperli apply Misc Test  each 3   • Glucose Blood (ONETOUCH VERIO) In Vitro Strip Use as directed to test blood sugar  strip 1   • LOSARTAN 100 MG Oral Tab Take 1 tablet (100 mg total) by mouth once daily.  90 tablet 3   • furosemide 20 MG Oral Tab TA following visit was completed using two-way, real-time interactive audio and/or video communication.   This has been done in good angie to provide continuity of care in the best interest of the provider-patient relationship, due to the ongoing public health

## 2020-07-21 RX ORDER — GLIPIZIDE 2.5 MG/1
TABLET, EXTENDED RELEASE ORAL
Qty: 360 TABLET | Refills: 0 | Status: SHIPPED | OUTPATIENT
Start: 2020-07-21 | End: 2020-11-18

## 2020-07-21 RX ORDER — POTASSIUM CHLORIDE 1500 MG/1
TABLET, FILM COATED, EXTENDED RELEASE ORAL
Qty: 60 TABLET | Refills: 0 | Status: SHIPPED | OUTPATIENT
Start: 2020-07-21 | End: 2020-10-06

## 2020-08-14 ENCOUNTER — OFFICE VISIT (OUTPATIENT)
Dept: PODIATRY CLINIC | Facility: CLINIC | Age: 85
End: 2020-08-14
Payer: MEDICARE

## 2020-08-14 DIAGNOSIS — M21.6X2 PLANTAR FLEXED METATARSAL BONE OF LEFT FOOT: Primary | ICD-10-CM

## 2020-08-14 DIAGNOSIS — M21.6X1 PLANTAR FLEXED METATARSAL BONE OF RIGHT FOOT: ICD-10-CM

## 2020-08-14 DIAGNOSIS — B35.1 ONYCHOMYCOSIS: ICD-10-CM

## 2020-08-14 DIAGNOSIS — L84 CALLUS OF FOOT: ICD-10-CM

## 2020-08-14 DIAGNOSIS — I73.89 OTHER SPECIFIED PERIPHERAL VASCULAR DISEASES (HCC): ICD-10-CM

## 2020-08-14 DIAGNOSIS — L84 HELOMA DURUM: ICD-10-CM

## 2020-08-14 DIAGNOSIS — E11.42 DIABETIC POLYNEUROPATHY ASSOCIATED WITH TYPE 2 DIABETES MELLITUS (HCC): ICD-10-CM

## 2020-08-14 DIAGNOSIS — M20.42 HAMMER TOES OF BOTH FEET: ICD-10-CM

## 2020-08-14 DIAGNOSIS — M20.41 HAMMER TOES OF BOTH FEET: ICD-10-CM

## 2020-08-14 PROCEDURE — 11056 PARNG/CUTG B9 HYPRKR LES 2-4: CPT | Performed by: PODIATRIST

## 2020-08-14 PROCEDURE — 11721 DEBRIDE NAIL 6 OR MORE: CPT | Performed by: PODIATRIST

## 2020-08-14 NOTE — PROGRESS NOTES
Patient returns in clinic complaining of painful calluses  Candido Gibbs is a 80year old male. Patient presents with:  Toenail Care: Pt is diabetic. Las a1c was 6.4 on 03/18/20. Saw PCP on 05/22/20. States toenail are causing pain and has pretty bad pain. Aspirin 81 MG Oral Tab Take 81 mg by mouth daily. • Multiple Vitamins-Minerals (CENTRUM SILVER) Oral Tab Take 1 Tab by mouth daily. • Omega-3 Fatty Acids (FISH OIL) 1000 MG Oral Cap Take  by mouth.  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    Callus of foot    Plantar flexed metatarsal bone of right foot    Heloma durum    Onychomycosis    Hammer toes of both feet    Other specified peripheral vascular diseases ([facility]        Plan:  Today using a nail nippers trimmed and debrided all toen

## 2020-10-06 NOTE — TELEPHONE ENCOUNTER
furosemide 20 MG Oral Tab 270 tablet 1 5/22/2020    Sig:   TAKE one TABLET BY MOUTH TWICE DAILY     Spoke with patient for update:  Reports order furosemide changed from: 2 pill BID, to 1 pill BID. See tele enc 5/22/2020.     Patient reports he is out of:

## 2020-10-06 NOTE — TELEPHONE ENCOUNTER
furosemide 20 MG Oral Tab     Trying to get it refilled but there is some confusion about how many pills he takes. Ever Aguilar told him one thing and bottle says another.   He wrote the new directions on the bottle with a felt pen and the pharmacist needs a new scr

## 2020-10-07 RX ORDER — POTASSIUM CHLORIDE 1500 MG/1
1 TABLET, FILM COATED, EXTENDED RELEASE ORAL 2 TIMES DAILY
Qty: 60 TABLET | Refills: 0 | Status: SHIPPED | OUTPATIENT
Start: 2020-10-07 | End: 2020-11-02

## 2020-10-07 RX ORDER — FUROSEMIDE 20 MG/1
TABLET ORAL
Qty: 270 TABLET | Refills: 1 | Status: SHIPPED | OUTPATIENT
Start: 2020-10-07 | End: 2021-07-07

## 2020-10-08 NOTE — TELEPHONE ENCOUNTER
Refills sent last night, pharmacy notified, they said they are preparing it. Per Dr. Kristina Lopez, patient needs follow up, I left detailed message for patient on home phone to make an appointment. My chart sent too.

## 2020-10-16 ENCOUNTER — OFFICE VISIT (OUTPATIENT)
Dept: PODIATRY CLINIC | Facility: CLINIC | Age: 85
End: 2020-10-16
Payer: MEDICARE

## 2020-10-16 DIAGNOSIS — M21.6X2 PLANTAR FLEXED METATARSAL BONE OF LEFT FOOT: Primary | ICD-10-CM

## 2020-10-16 DIAGNOSIS — L84 HELOMA DURUM: ICD-10-CM

## 2020-10-16 DIAGNOSIS — B35.1 ONYCHOMYCOSIS: ICD-10-CM

## 2020-10-16 DIAGNOSIS — Z89.421 HISTORY OF PARTIAL AMPUTATION OF TOE OF RIGHT FOOT (HCC): ICD-10-CM

## 2020-10-16 DIAGNOSIS — I73.89 OTHER SPECIFIED PERIPHERAL VASCULAR DISEASES (HCC): ICD-10-CM

## 2020-10-16 DIAGNOSIS — M20.42 HAMMER TOES OF BOTH FEET: ICD-10-CM

## 2020-10-16 DIAGNOSIS — E11.42 DIABETIC POLYNEUROPATHY ASSOCIATED WITH TYPE 2 DIABETES MELLITUS (HCC): ICD-10-CM

## 2020-10-16 DIAGNOSIS — M20.41 HAMMER TOES OF BOTH FEET: ICD-10-CM

## 2020-10-16 DIAGNOSIS — M21.6X1 PLANTAR FLEXED METATARSAL BONE OF RIGHT FOOT: ICD-10-CM

## 2020-10-16 DIAGNOSIS — L84 CALLUS OF FOOT: ICD-10-CM

## 2020-10-16 PROCEDURE — 11056 PARNG/CUTG B9 HYPRKR LES 2-4: CPT | Performed by: PODIATRIST

## 2020-10-16 PROCEDURE — 11721 DEBRIDE NAIL 6 OR MORE: CPT | Performed by: PODIATRIST

## 2020-10-16 NOTE — PROGRESS NOTES
Patient returns in clinic complaining of painful calluses  Miya Ortiz is a 80year old male. Patient presents with:  Diabetic Foot Care: BS this AM 80 - nail and callus trimming.         HPI:   Pleasant gentleman who is well-known to me in a former patie Tab Take 1 Tab by mouth daily. • Omega-3 Fatty Acids (FISH OIL) 1000 MG Oral Cap Take  by mouth. Take two capsules daily     • Magnesium 100 MG Oral Tab Take 250 mg by mouth daily.  Take two tablets daily         Past Medical History:   Diagnosis Date history of malignant neoplasm of prostate    • Polymyalgia ([facility] [address]     rheumatica   • Pyogenic inflammation of bone (HCC) 12/6/2019   • Rectal bleeding 11/4/2019   • Rhabdomyolysis    • Rotator cuff sprain    • Severe sepsis (HCC)    • Stress incontinence, m Yes        Alcohol/week: 0.0 standard drinks        Comment: 1 beer on occasion      Drug use: No    Other Topics      Concerns:        Caffeine Concern: Yes        Exercise: Yes          REVIEW OF SYSTEMS:   Review of Systems  GENERAL HEALTH: feels well o with type 2 diabetes mellitus (HCC)    Callus of foot    Plantar flexed metatarsal bone of right foot    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

## 2020-10-21 ENCOUNTER — LAB ENCOUNTER (OUTPATIENT)
Dept: LAB | Age: 85
End: 2020-10-21
Attending: FAMILY MEDICINE
Payer: MEDICARE

## 2020-10-21 ENCOUNTER — OFFICE VISIT (OUTPATIENT)
Dept: FAMILY MEDICINE CLINIC | Facility: CLINIC | Age: 85
End: 2020-10-21
Payer: MEDICARE

## 2020-10-21 VITALS
DIASTOLIC BLOOD PRESSURE: 62 MMHG | WEIGHT: 177 LBS | SYSTOLIC BLOOD PRESSURE: 132 MMHG | OXYGEN SATURATION: 95 % | HEART RATE: 82 BPM | BODY MASS INDEX: 27.78 KG/M2 | HEIGHT: 67 IN | RESPIRATION RATE: 18 BRPM

## 2020-10-21 DIAGNOSIS — E11.621 TYPE 2 DIABETES MELLITUS WITH FOOT ULCER, WITHOUT LONG-TERM CURRENT USE OF INSULIN (HCC): ICD-10-CM

## 2020-10-21 DIAGNOSIS — I10 ESSENTIAL HYPERTENSION: ICD-10-CM

## 2020-10-21 DIAGNOSIS — E11.621 TYPE 2 DIABETES MELLITUS WITH FOOT ULCER, WITHOUT LONG-TERM CURRENT USE OF INSULIN (HCC): Primary | ICD-10-CM

## 2020-10-21 DIAGNOSIS — L97.509 TYPE 2 DIABETES MELLITUS WITH FOOT ULCER, WITHOUT LONG-TERM CURRENT USE OF INSULIN (HCC): ICD-10-CM

## 2020-10-21 DIAGNOSIS — L97.509 TYPE 2 DIABETES MELLITUS WITH FOOT ULCER, WITHOUT LONG-TERM CURRENT USE OF INSULIN (HCC): Primary | ICD-10-CM

## 2020-10-21 PROCEDURE — 85025 COMPLETE CBC W/AUTO DIFF WBC: CPT

## 2020-10-21 PROCEDURE — 80053 COMPREHEN METABOLIC PANEL: CPT

## 2020-10-21 PROCEDURE — 99213 OFFICE O/P EST LOW 20 MIN: CPT | Performed by: FAMILY MEDICINE

## 2020-10-21 PROCEDURE — 83036 HEMOGLOBIN GLYCOSYLATED A1C: CPT

## 2020-10-21 PROCEDURE — 90662 IIV NO PRSV INCREASED AG IM: CPT | Performed by: FAMILY MEDICINE

## 2020-10-21 PROCEDURE — G0008 ADMIN INFLUENZA VIRUS VAC: HCPCS | Performed by: FAMILY MEDICINE

## 2020-10-21 PROCEDURE — 36415 COLL VENOUS BLD VENIPUNCTURE: CPT

## 2020-10-21 NOTE — PROGRESS NOTES
Patient is well-known to my practice he is here for review of his blood sugars and his medications. I also took this opportunity did perform a complete diabetic foot exam and he sees a podiatrist regularly.   He does have some superficial redness of his le

## 2020-10-23 ENCOUNTER — TELEPHONE (OUTPATIENT)
Dept: FAMILY MEDICINE CLINIC | Facility: CLINIC | Age: 85
End: 2020-10-23

## 2020-10-26 DIAGNOSIS — E11.621 TYPE 2 DIABETES MELLITUS WITH FOOT ULCER, WITHOUT LONG-TERM CURRENT USE OF INSULIN (HCC): Primary | ICD-10-CM

## 2020-10-26 DIAGNOSIS — L97.509 TYPE 2 DIABETES MELLITUS WITH FOOT ULCER, WITHOUT LONG-TERM CURRENT USE OF INSULIN (HCC): Primary | ICD-10-CM

## 2020-10-26 RX ORDER — BLOOD SUGAR DIAGNOSTIC
STRIP MISCELLANEOUS
Qty: 100 STRIP | Refills: 6 | Status: SHIPPED | OUTPATIENT
Start: 2020-10-26 | End: 2020-10-28

## 2020-10-27 DIAGNOSIS — L97.509 TYPE 2 DIABETES MELLITUS WITH FOOT ULCER, WITHOUT LONG-TERM CURRENT USE OF INSULIN (HCC): ICD-10-CM

## 2020-10-27 DIAGNOSIS — E11.621 TYPE 2 DIABETES MELLITUS WITH FOOT ULCER, WITHOUT LONG-TERM CURRENT USE OF INSULIN (HCC): ICD-10-CM

## 2020-10-27 RX ORDER — BLOOD SUGAR DIAGNOSTIC
STRIP MISCELLANEOUS
Qty: 100 STRIP | Refills: 6 | OUTPATIENT
Start: 2020-10-27

## 2020-10-28 ENCOUNTER — TELEPHONE (OUTPATIENT)
Dept: FAMILY MEDICINE CLINIC | Facility: CLINIC | Age: 85
End: 2020-10-28

## 2020-10-28 DIAGNOSIS — L97.509 TYPE 2 DIABETES MELLITUS WITH FOOT ULCER, WITHOUT LONG-TERM CURRENT USE OF INSULIN (HCC): ICD-10-CM

## 2020-10-28 DIAGNOSIS — E11.621 TYPE 2 DIABETES MELLITUS WITH FOOT ULCER, WITHOUT LONG-TERM CURRENT USE OF INSULIN (HCC): ICD-10-CM

## 2020-10-28 RX ORDER — BLOOD SUGAR DIAGNOSTIC
STRIP MISCELLANEOUS
Qty: 200 STRIP | Refills: 1 | Status: SHIPPED | OUTPATIENT
Start: 2020-10-28 | End: 2020-10-30

## 2020-10-28 NOTE — TELEPHONE ENCOUNTER
HAD ELECTRONIC DENIAL FOR HIS TEST STRIPS. ONE TOUCH ULTRA    NEEDS TO HAVE  ICD 10 CODE AND DOES HE USE INSULIN. CAN WE PLEASE OK THIS REFILL?

## 2020-10-30 RX ORDER — BLOOD SUGAR DIAGNOSTIC
STRIP MISCELLANEOUS
Qty: 200 STRIP | Refills: 1 | Status: SHIPPED | OUTPATIENT
Start: 2020-10-30 | End: 2021-05-24

## 2020-10-30 NOTE — TELEPHONE ENCOUNTER
pls resend script - pharmacy did not receive - pharmacy also asking to make sure medicare compliant with codes.   pls do so asap

## 2020-11-02 RX ORDER — POTASSIUM CHLORIDE 1500 MG/1
TABLET, FILM COATED, EXTENDED RELEASE ORAL
Qty: 60 TABLET | Refills: 0 | Status: SHIPPED | OUTPATIENT
Start: 2020-11-02 | End: 2020-12-14

## 2020-11-02 RX ORDER — SIMVASTATIN 10 MG
TABLET ORAL
Qty: 90 TABLET | Refills: 0 | Status: SHIPPED | OUTPATIENT
Start: 2020-11-02 | End: 2020-12-14

## 2020-11-18 RX ORDER — GLIPIZIDE 2.5 MG/1
TABLET, EXTENDED RELEASE ORAL
Qty: 360 TABLET | Refills: 0 | Status: SHIPPED | OUTPATIENT
Start: 2020-11-18 | End: 2021-03-04

## 2020-12-14 RX ORDER — POTASSIUM CHLORIDE 1500 MG/1
TABLET, FILM COATED, EXTENDED RELEASE ORAL
Qty: 60 TABLET | Refills: 0 | Status: SHIPPED | OUTPATIENT
Start: 2020-12-14 | End: 2021-01-13

## 2020-12-14 RX ORDER — SIMVASTATIN 10 MG
TABLET ORAL
Qty: 90 TABLET | Refills: 0 | Status: SHIPPED | OUTPATIENT
Start: 2020-12-14 | End: 2021-05-17

## 2020-12-18 ENCOUNTER — OFFICE VISIT (OUTPATIENT)
Dept: PODIATRY CLINIC | Facility: CLINIC | Age: 85
End: 2020-12-18
Payer: MEDICARE

## 2020-12-18 VITALS — HEIGHT: 67 IN | BODY MASS INDEX: 27.78 KG/M2 | WEIGHT: 177 LBS

## 2020-12-18 DIAGNOSIS — L84 HELOMA DURUM: ICD-10-CM

## 2020-12-18 DIAGNOSIS — M20.41 HAMMER TOES OF BOTH FEET: ICD-10-CM

## 2020-12-18 DIAGNOSIS — M21.6X2 PLANTAR FLEXED METATARSAL BONE OF LEFT FOOT: Primary | ICD-10-CM

## 2020-12-18 DIAGNOSIS — E11.42 DIABETIC POLYNEUROPATHY ASSOCIATED WITH TYPE 2 DIABETES MELLITUS (HCC): ICD-10-CM

## 2020-12-18 DIAGNOSIS — M21.6X1 PLANTAR FLEXED METATARSAL BONE OF RIGHT FOOT: ICD-10-CM

## 2020-12-18 DIAGNOSIS — B35.1 ONYCHOMYCOSIS: ICD-10-CM

## 2020-12-18 DIAGNOSIS — I73.89 OTHER SPECIFIED PERIPHERAL VASCULAR DISEASES (HCC): ICD-10-CM

## 2020-12-18 DIAGNOSIS — L84 CALLUS OF FOOT: ICD-10-CM

## 2020-12-18 DIAGNOSIS — M20.42 HAMMER TOES OF BOTH FEET: ICD-10-CM

## 2020-12-18 PROCEDURE — 11721 DEBRIDE NAIL 6 OR MORE: CPT | Performed by: PODIATRIST

## 2020-12-20 NOTE — PROGRESS NOTES
Patient returns in clinic complaining of painful calluses  Robi Tenorio is a 80year old male.  Patient presents with:  Toenail Care        HPI:   Pleasant gentleman who is well-known to me in a former patient presents to the clinic for routine foot care a 6/19/2014   • Acute colitis 2/27/2016   • Aortic atherosclerosis ([facility] [address] 12/25/2018    CT abdomen 2/27/16   • Arthritis of low back ([facility] [address] 12/25/2018    Lumbar DDD CXR 8/17/18   • Atypical mole 1/17/2013   • Benign essential HTN    • Bronchitis, not specified acromioclavicular joint separation 5/20/2013   • Type II or unspecified type diabetes mellitus without mention of complication, not stated as uncontrolled    • Type II or unspecified type diabetes mellitus without mention of complication, uncontrolled    • unusual skin lesions or rashes  RESPIRATORY: denies shortness of breath with exertion  CARDIOVASCULAR: denies chest pain on exertion  GI: denies abdominal pain and denies heartburn  NEURO: denies headaches    EXAM:   Ht 5' 7\" (1.702 m)   Wt 177 lb (80.3 k and debrided all toenails as documented above this was a total of 10 toenails. Debrided them as far down to healthy tissue as possible and using a 15 blade trimmed and debrided all hyperkeratoses uneventfully alleviating the patient's painful symptoms.   Megha Muro

## 2020-12-29 ENCOUNTER — OFFICE VISIT (OUTPATIENT)
Dept: FAMILY MEDICINE CLINIC | Facility: CLINIC | Age: 85
End: 2020-12-29
Payer: MEDICARE

## 2020-12-29 VITALS
RESPIRATION RATE: 18 BRPM | BODY MASS INDEX: 28.09 KG/M2 | WEIGHT: 179 LBS | HEART RATE: 89 BPM | HEIGHT: 67 IN | OXYGEN SATURATION: 98 % | SYSTOLIC BLOOD PRESSURE: 140 MMHG | DIASTOLIC BLOOD PRESSURE: 60 MMHG

## 2020-12-29 DIAGNOSIS — I70.209 DIABETES TYPE 2 WITH ATHEROSCLEROSIS OF ARTERIES OF EXTREMITIES (HCC): Primary | ICD-10-CM

## 2020-12-29 DIAGNOSIS — E11.51 DIABETES TYPE 2 WITH ATHEROSCLEROSIS OF ARTERIES OF EXTREMITIES (HCC): Primary | ICD-10-CM

## 2020-12-29 PROCEDURE — 99213 OFFICE O/P EST LOW 20 MIN: CPT | Performed by: FAMILY MEDICINE

## 2020-12-29 RX ORDER — MIRABEGRON 25 MG/1
25 TABLET, FILM COATED, EXTENDED RELEASE ORAL DAILY
Qty: 30 TABLET | Refills: 0 | Status: SHIPPED | OUTPATIENT
Start: 2020-12-29 | End: 2021-01-28

## 2020-12-29 NOTE — PROGRESS NOTES
Presents for follow-up of his blood sugars he has good days and bad days but in general is doing quite well. We spent the majority of today's visit talking about anxiety and depression. Is hard of hearing status certainly is a challenge for him.   Sylvia

## 2021-01-13 RX ORDER — POTASSIUM CHLORIDE 1500 MG/1
1 TABLET, FILM COATED, EXTENDED RELEASE ORAL 2 TIMES DAILY
Qty: 60 TABLET | Refills: 1 | Status: SHIPPED | OUTPATIENT
Start: 2021-01-13 | End: 2021-03-22

## 2021-01-27 DIAGNOSIS — Z23 NEED FOR VACCINATION: ICD-10-CM

## 2021-02-12 NOTE — ED PROVIDER NOTES
Last visit 9/14/20  Future 9/20/21 Patient Seen in: [facility] Emergency Department      History   Patient presents with:  GI Bleeding (gastrointestinal)    Stated Complaint: rectal bleed/pain    HPI    Patient is a 80-year-old male with a history of hypertension, diabetes, high chol hereditary and idiopathic peripheral neuropathy    • Urethral stricture    • Urinary frequency               Past Surgical History:   Procedure Laterality Date   • ANESTHESIA FOR EAR SURGERY  age 8   • CABG  01/01/2007    CABG x4   • CATARACT Bilateral Musculoskeletal: Normal range of motion and neck supple. Cardiovascular:      Rate and Rhythm: Normal rate and regular rhythm. Heart sounds: Normal heart sounds.    Pulmonary:      Effort: Pulmonary effort is normal.      Breath sounds: Normal breath Abnormal            Final result                 Please view results for these tests on the individual orders.    LACTIC ACID, PLASMA   RAINBOW DRAW BLUE   RAINBOW DRAW LAVENDER   RAINBOW DRAW LIGHT GREEN   RAINBOW DRAW GOLD   C. DIFFICILE(TOXIGENIC)PCR   S clinically with point of maximal tenderness. Dictated by: Ingrid Fracnisco MD on 10/09/2019 at 13:03     Approved by: Ingrid Francisco MD            Ct Abdomen Pelvis Iv Contrast, No Oral (er)    Result Date: 10/26/2019  CONCLUSION:  1.  There is a large amount of s Clinical Impression:  Acute colitis  (primary encounter diagnosis)  Acute renal insufficiency  Hyperglycemia    Disposition:  Admit  10/26/2019 11:25 pm    Follow-up:  No follow-up provider specified.       Medications Prescribed:  Current Discharge Med

## 2021-02-19 ENCOUNTER — OFFICE VISIT (OUTPATIENT)
Dept: PODIATRY CLINIC | Facility: CLINIC | Age: 86
End: 2021-02-19
Payer: MEDICARE

## 2021-02-19 VITALS — BODY MASS INDEX: 26.98 KG/M2 | WEIGHT: 178 LBS | HEIGHT: 68 IN

## 2021-02-19 DIAGNOSIS — M21.6X2 PLANTAR FLEXED METATARSAL BONE OF LEFT FOOT: Primary | ICD-10-CM

## 2021-02-19 DIAGNOSIS — E11.42 DIABETIC POLYNEUROPATHY ASSOCIATED WITH TYPE 2 DIABETES MELLITUS (HCC): ICD-10-CM

## 2021-02-19 DIAGNOSIS — M20.42 HAMMER TOES OF BOTH FEET: ICD-10-CM

## 2021-02-19 DIAGNOSIS — Z89.421 HISTORY OF PARTIAL AMPUTATION OF TOE OF RIGHT FOOT (HCC): ICD-10-CM

## 2021-02-19 DIAGNOSIS — M21.6X1 PLANTAR FLEXED METATARSAL BONE OF RIGHT FOOT: ICD-10-CM

## 2021-02-19 DIAGNOSIS — I73.89 OTHER SPECIFIED PERIPHERAL VASCULAR DISEASES (HCC): ICD-10-CM

## 2021-02-19 DIAGNOSIS — B35.1 ONYCHOMYCOSIS: ICD-10-CM

## 2021-02-19 DIAGNOSIS — L84 CALLUS OF FOOT: ICD-10-CM

## 2021-02-19 DIAGNOSIS — M20.41 HAMMER TOES OF BOTH FEET: ICD-10-CM

## 2021-02-19 DIAGNOSIS — L84 HELOMA DURUM: ICD-10-CM

## 2021-02-19 NOTE — PROGRESS NOTES
Jeffrey Lord is a 80year old male. Patient presents with:  Toenail Care: routine        HPI:   This pleasant gentleman returns to the clinic complaining of his toes hurting him on his left foot. He has recently been shoveling snow is wearing boots.   At Arthritis of low back ([facility] [address] 12/25/2018    Lumbar DDD CXR 8/17/18   • Atypical mole 1/17/2013   • Benign essential HTN    • Bronchitis, not specified as acute or chronic    • CAD (coronary artery disease) 1/17/2013   • Cancer (HCC)     prostate cancer -seed complication, not stated as uncontrolled    • Type II or unspecified type diabetes mellitus without mention of complication, uncontrolled    • Unspecified hereditary and idiopathic peripheral neuropathy    • Urethral stricture    • Urinary frequency    • V chest pain on exertion  GI: denies abdominal pain and denies heartburn  NEURO: denies headaches    EXAM:   Ht 5' 8\" (1.727 m)   Wt 178 lb (80.7 kg)   BMI 27.06 kg/m²   GENERAL: well developed, well nourished, in no apparent distress  EXTREMITIES:   1.  Int possible on both feet. This was done uneventfully there was no hemorrhage. There is mild incurvation of the medial and lateral nail borders of the hallux which using a slant back technique were removed and they would not get ingrown.   Gave the patient in

## 2021-02-22 ENCOUNTER — OFFICE VISIT (OUTPATIENT)
Dept: FAMILY MEDICINE CLINIC | Facility: CLINIC | Age: 86
End: 2021-02-22
Payer: MEDICARE

## 2021-02-22 ENCOUNTER — LAB ENCOUNTER (OUTPATIENT)
Dept: LAB | Age: 86
End: 2021-02-22
Attending: FAMILY MEDICINE
Payer: MEDICARE

## 2021-02-22 VITALS
DIASTOLIC BLOOD PRESSURE: 78 MMHG | HEIGHT: 68 IN | BODY MASS INDEX: 27.43 KG/M2 | RESPIRATION RATE: 22 BRPM | SYSTOLIC BLOOD PRESSURE: 132 MMHG | HEART RATE: 78 BPM | WEIGHT: 181 LBS | OXYGEN SATURATION: 98 %

## 2021-02-22 DIAGNOSIS — I70.209 DIABETES TYPE 2 WITH ATHEROSCLEROSIS OF ARTERIES OF EXTREMITIES (HCC): Primary | ICD-10-CM

## 2021-02-22 DIAGNOSIS — I70.209 DIABETES TYPE 2 WITH ATHEROSCLEROSIS OF ARTERIES OF EXTREMITIES (HCC): ICD-10-CM

## 2021-02-22 DIAGNOSIS — E11.621 TYPE 2 DIABETES MELLITUS WITH FOOT ULCER, WITHOUT LONG-TERM CURRENT USE OF INSULIN (HCC): ICD-10-CM

## 2021-02-22 DIAGNOSIS — E11.51 DIABETES TYPE 2 WITH ATHEROSCLEROSIS OF ARTERIES OF EXTREMITIES (HCC): ICD-10-CM

## 2021-02-22 DIAGNOSIS — L97.509 TYPE 2 DIABETES MELLITUS WITH FOOT ULCER, WITHOUT LONG-TERM CURRENT USE OF INSULIN (HCC): ICD-10-CM

## 2021-02-22 DIAGNOSIS — C61 MALIGNANT NEOPLASM OF PROSTATE (HCC): ICD-10-CM

## 2021-02-22 DIAGNOSIS — I87.2 VENOUS STASIS DERMATITIS OF BOTH LOWER EXTREMITIES: ICD-10-CM

## 2021-02-22 DIAGNOSIS — E11.51 DIABETES TYPE 2 WITH ATHEROSCLEROSIS OF ARTERIES OF EXTREMITIES (HCC): Primary | ICD-10-CM

## 2021-02-22 PROBLEM — L03.116 CELLULITIS AND ABSCESS OF LEFT LOWER EXTREMITY: Status: RESOLVED | Noted: 2020-05-11 | Resolved: 2021-02-22

## 2021-02-22 PROBLEM — L02.416 CELLULITIS AND ABSCESS OF LEFT LOWER EXTREMITY: Status: RESOLVED | Noted: 2020-05-11 | Resolved: 2021-02-22

## 2021-02-22 LAB
EST. AVERAGE GLUCOSE BLD GHB EST-MCNC: 151 MG/DL (ref 68–126)
HBA1C MFR BLD HPLC: 6.9 % (ref ?–5.7)

## 2021-02-22 PROCEDURE — 36415 COLL VENOUS BLD VENIPUNCTURE: CPT

## 2021-02-22 PROCEDURE — 83036 HEMOGLOBIN GLYCOSYLATED A1C: CPT

## 2021-02-22 PROCEDURE — 99213 OFFICE O/P EST LOW 20 MIN: CPT | Performed by: FAMILY MEDICINE

## 2021-02-22 NOTE — PROGRESS NOTES
[facility] Progress Note    SUBJECTIVE: Vito Green 80year old male is here today for Patient presents with:  Rash: b/l shins  Diabetes      Hard of hearing, left hearing aids at home. Not working well for him lately.     Is here to be sen for Intervertebral lumbar disc disorder with myelopathy, lumbar region    • Lesion of plantar nerve 6/29/2012   • Malignant neoplasm of prostate ([facility] [address] 6/29/2012   • Neuropathy    • Nocturnal leg cramps 11/7/2013   • Other abnormal blood chemistry    • Other an Hx:  See HPI    ROS  Negative other than as noted    OBJECTIVE:  /78   Pulse 78   Resp 22   Ht 5' 8\" (1.727 m)   Wt 181 lb (82.1 kg)   SpO2 98%   BMI 27.52 kg/m²     Exam    Extremity - pitting edema, red discoloration on lower legs, no open lesions ([address]    Venous stasis dermatitis of both lower extremities         Advised on care of skin of legs, no active treatment bylolis armendariz this time. He has no pain, no active ulcer currently. Prostate cancer treated. Will resolve those active problems.      C

## 2021-03-04 ENCOUNTER — TELEPHONE (OUTPATIENT)
Dept: FAMILY MEDICINE CLINIC | Facility: CLINIC | Age: 86
End: 2021-03-04

## 2021-03-04 RX ORDER — GLIPIZIDE 2.5 MG/1
5 TABLET, EXTENDED RELEASE ORAL 2 TIMES DAILY
Qty: 360 TABLET | Refills: 0 | Status: SHIPPED | OUTPATIENT
Start: 2021-03-04 | End: 2021-05-24

## 2021-03-22 RX ORDER — POTASSIUM CHLORIDE 1500 MG/1
TABLET, FILM COATED, EXTENDED RELEASE ORAL
Qty: 60 TABLET | Refills: 0 | Status: SHIPPED | OUTPATIENT
Start: 2021-03-22 | End: 2021-05-17

## 2021-03-22 NOTE — TELEPHONE ENCOUNTER
Last ov 2/22/2021  Next ov 5/24/2021    Last refill 1/13/2021    .   Potassium:    Lab Results   Component Value Date    K 4.4 10/21/2020    K 3.6 05/19/2020    K 4.0 03/18/2020

## 2021-03-23 DIAGNOSIS — E11.621 TYPE 2 DIABETES MELLITUS WITH FOOT ULCER, WITHOUT LONG-TERM CURRENT USE OF INSULIN (HCC): ICD-10-CM

## 2021-03-23 DIAGNOSIS — L97.509 TYPE 2 DIABETES MELLITUS WITH FOOT ULCER, WITHOUT LONG-TERM CURRENT USE OF INSULIN (HCC): ICD-10-CM

## 2021-03-23 RX ORDER — LANCETS
EACH MISCELLANEOUS
Qty: 200 EACH | Refills: 3 | Status: SHIPPED | OUTPATIENT
Start: 2021-03-23 | End: 2021-05-24

## 2021-03-23 RX ORDER — BLOOD-GLUCOSE METER
1 EACH MISCELLANEOUS 2 TIMES DAILY
Qty: 1 KIT | Refills: 0 | Status: SHIPPED | OUTPATIENT
Start: 2021-03-23 | End: 2021-09-21 | Stop reason: CLARIF

## 2021-05-13 ENCOUNTER — OFFICE VISIT (OUTPATIENT)
Dept: PODIATRY CLINIC | Facility: CLINIC | Age: 86
End: 2021-05-13
Payer: MEDICARE

## 2021-05-13 DIAGNOSIS — B35.1 ONYCHOMYCOSIS: ICD-10-CM

## 2021-05-13 DIAGNOSIS — L84 CALLUS OF FOOT: ICD-10-CM

## 2021-05-13 DIAGNOSIS — I73.89 OTHER SPECIFIED PERIPHERAL VASCULAR DISEASES (HCC): ICD-10-CM

## 2021-05-13 DIAGNOSIS — L84 HELOMA DURUM: ICD-10-CM

## 2021-05-13 DIAGNOSIS — M21.6X2 PLANTAR FLEXED METATARSAL BONE OF LEFT FOOT: Primary | ICD-10-CM

## 2021-05-13 DIAGNOSIS — M20.42 HAMMER TOES OF BOTH FEET: ICD-10-CM

## 2021-05-13 DIAGNOSIS — M20.41 HAMMER TOES OF BOTH FEET: ICD-10-CM

## 2021-05-13 DIAGNOSIS — Z89.421 HISTORY OF PARTIAL AMPUTATION OF TOE OF RIGHT FOOT (HCC): ICD-10-CM

## 2021-05-13 DIAGNOSIS — E11.42 DIABETIC POLYNEUROPATHY ASSOCIATED WITH TYPE 2 DIABETES MELLITUS (HCC): ICD-10-CM

## 2021-05-13 PROCEDURE — 11721 DEBRIDE NAIL 6 OR MORE: CPT | Performed by: PODIATRIST

## 2021-05-13 NOTE — PROGRESS NOTES
Dany Jimenez is a 80year old male. Patient presents with:  Toenail Care: patient unable to do it himself - pain scale 5/10. HPI:   This pleasant gentleman returns to the clinic complaining of his toes hurting him on his left foot.   He has recently History:   Diagnosis Date   • Accelerated hypertension 6/19/2014   • Acute colitis 2/27/2016   • Aortic atherosclerosis ([facility] 12/25/2018    CT abdomen 2/27/16   • Arthritis of low back ([facility] 12/25/2018    Lumbar DDD CXR 8/17/18   • Atypical mole 1/17/2013 Rotator cuff sprain    • [facility]    • Stress incontinence, male 5/16/2019   • Type 2 acromioclavicular joint separation 5/20/2013   • Type 2 diabetes mellitus with foot ulcer, without long-term current use of insulin ([facility] 8/26/2014   • Type II education level: Not on file    Tobacco Use      Smoking status: Former Smoker        Packs/day: 0.00        Quit date: 1980        Years since quittin.3      Smokeless tobacco: Never Used    Vaping Use      Vaping Use: Never used    Substance and which was nontraumatic due to bone infection on the fourth toe right foot    ASSESSMENT AND PLAN:   Diagnoses and all orders for this visit:    Plantar flexed metatarsal bone of left foot    Diabetic polyneuropathy associated with type 2 diabetes mellitus

## 2021-05-17 RX ORDER — SIMVASTATIN 10 MG
TABLET ORAL
Qty: 90 TABLET | Refills: 0 | Status: SHIPPED | OUTPATIENT
Start: 2021-05-17 | End: 2021-09-01

## 2021-05-17 RX ORDER — POTASSIUM CHLORIDE 1500 MG/1
TABLET, FILM COATED, EXTENDED RELEASE ORAL
Qty: 60 TABLET | Refills: 0 | Status: SHIPPED | OUTPATIENT
Start: 2021-05-17 | End: 2021-07-07

## 2021-05-24 ENCOUNTER — LAB ENCOUNTER (OUTPATIENT)
Dept: LAB | Age: 86
End: 2021-05-24
Attending: FAMILY MEDICINE
Payer: MEDICARE

## 2021-05-24 ENCOUNTER — OFFICE VISIT (OUTPATIENT)
Dept: FAMILY MEDICINE CLINIC | Facility: CLINIC | Age: 86
End: 2021-05-24
Payer: MEDICARE

## 2021-05-24 VITALS
RESPIRATION RATE: 18 BRPM | BODY MASS INDEX: 29.73 KG/M2 | DIASTOLIC BLOOD PRESSURE: 70 MMHG | SYSTOLIC BLOOD PRESSURE: 122 MMHG | HEIGHT: 66 IN | OXYGEN SATURATION: 94 % | HEART RATE: 88 BPM | WEIGHT: 185 LBS

## 2021-05-24 DIAGNOSIS — I10 ESSENTIAL HYPERTENSION: ICD-10-CM

## 2021-05-24 DIAGNOSIS — L97.509 TYPE 2 DIABETES MELLITUS WITH FOOT ULCER, WITHOUT LONG-TERM CURRENT USE OF INSULIN (HCC): ICD-10-CM

## 2021-05-24 DIAGNOSIS — E11.621 TYPE 2 DIABETES MELLITUS WITH FOOT ULCER, WITHOUT LONG-TERM CURRENT USE OF INSULIN (HCC): Primary | ICD-10-CM

## 2021-05-24 DIAGNOSIS — R35.0 FREQUENT URINATION: ICD-10-CM

## 2021-05-24 DIAGNOSIS — E78.5 HYPERLIPIDEMIA, UNSPECIFIED HYPERLIPIDEMIA TYPE: ICD-10-CM

## 2021-05-24 DIAGNOSIS — B35.6 TINEA CRURIS: ICD-10-CM

## 2021-05-24 DIAGNOSIS — E11.621 TYPE 2 DIABETES MELLITUS WITH FOOT ULCER, WITHOUT LONG-TERM CURRENT USE OF INSULIN (HCC): ICD-10-CM

## 2021-05-24 DIAGNOSIS — Z13.0 SCREENING FOR DEFICIENCY ANEMIA: ICD-10-CM

## 2021-05-24 DIAGNOSIS — L97.509 TYPE 2 DIABETES MELLITUS WITH FOOT ULCER, WITHOUT LONG-TERM CURRENT USE OF INSULIN (HCC): Primary | ICD-10-CM

## 2021-05-24 DIAGNOSIS — Z00.00 ENCOUNTER FOR ANNUAL HEALTH EXAMINATION: ICD-10-CM

## 2021-05-24 PROCEDURE — 80053 COMPREHEN METABOLIC PANEL: CPT

## 2021-05-24 PROCEDURE — G0439 PPPS, SUBSEQ VISIT: HCPCS | Performed by: FAMILY MEDICINE

## 2021-05-24 PROCEDURE — 85025 COMPLETE CBC W/AUTO DIFF WBC: CPT

## 2021-05-24 PROCEDURE — 36415 COLL VENOUS BLD VENIPUNCTURE: CPT

## 2021-05-24 PROCEDURE — 80061 LIPID PANEL: CPT

## 2021-05-24 PROCEDURE — 83036 HEMOGLOBIN GLYCOSYLATED A1C: CPT

## 2021-05-24 RX ORDER — LANCETS
EACH MISCELLANEOUS
Qty: 200 EACH | Refills: 3 | Status: SHIPPED | OUTPATIENT
Start: 2021-05-24 | End: 2021-06-18 | Stop reason: ALTCHOICE

## 2021-05-24 RX ORDER — BLOOD SUGAR DIAGNOSTIC
STRIP MISCELLANEOUS
Qty: 200 STRIP | Refills: 3 | Status: SHIPPED | OUTPATIENT
Start: 2021-05-24 | End: 2021-09-21 | Stop reason: CLARIF

## 2021-05-24 RX ORDER — GLIPIZIDE 2.5 MG/1
TABLET, EXTENDED RELEASE ORAL
Qty: 360 TABLET | Refills: 0 | Status: SHIPPED | OUTPATIENT
Start: 2021-05-24 | End: 2021-07-07

## 2021-05-24 RX ORDER — TAMSULOSIN HYDROCHLORIDE 0.4 MG/1
0.4 CAPSULE ORAL DAILY
Qty: 30 CAPSULE | Refills: 2 | Status: SHIPPED | OUTPATIENT
Start: 2021-05-24 | End: 2021-06-23

## 2021-05-24 NOTE — PATIENT INSTRUCTIONS
Wallace Castellano's SCREENING SCHEDULE   Tests on this list are recommended by your physician but may not be covered, or covered at this frequency, by your insurer. Please check with your insurance carrier before scheduling to verify coverage.     PREVENTATIV Limited to patients who meet one of the following criteria:   • Men who are 73-68 years old and have smoked more than 100 cigarettes in their lifetime   • Anyone with a family history    Colorectal Cancer Screening Covered up to Age 76     Colonoscopy Scre 23 (Pneumovax)  Covered Once after 65 No orders found for this or any previous visit. Please get once after your 65th birthday    Hepatitis B for Moderate/High Risk No orders found for this or any previous visit.  Medium/high risk factors:   End-stage renal

## 2021-05-24 NOTE — PROGRESS NOTES
HPI:   Shahla Calero is a 80year old male who presents for a Medicare Subsequent Annual Wellness visit (Pt already had Initial Annual Wellness). Needs refill on diabetes supplies. Has been urinating a lot.  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 Coronary artery disease involving native heart without angina pectoris, Coronary atherosclerosis of unspecified type of vessel, native or graft, Dermatophytosis of foot, Dermatophytosis of the body, Diabetes mellitus type 2 in nonobese [facility]), Diabetes type history that includes repair rotator cuff,acute; circumcision,othr (N/A, 2/25/2016); cystoscopy,dil urethral stricture (N/A, 2/25/2016); patient with preoperative order for iv antibiotic surgical site infect (N/A, 2/25/2016); patient documented not to have 20/40   Able To Tolerate Visual Acuity: Yes      General Appearance:  Alert, cooperative, no distress, appears stated age   Head:  Normocephalic, without obvious abnormality, atraumatic   Eyes:  PERRL, conjunctiva/corneas clear, EOM's intact, both eyes   E 01/01/2008   • Zoster Vaccine Recombinant Adjuvanted (Shingrix) 05/08/2018, 07/01/2020        ASSESSMENT AND OTHER RELEVANT CHRONIC CONDITIONS:   Rexann Prader is a 80year old male who presents for a Medicare Assessment.      PLAN SUMMARY:   Diagnoses and LDL Annually LDL Cholesterol (mg/dL)   Date Value   03/18/2020 54     LDL CHOLESTROL (mg/dL)   Date Value   09/13/2013 58        EKG - w/ Initial Preventative Physical Exam only, or if medically necessary Electrocardiogram date05/19/2020    Colorectal Canc prescription benefits      SPECIFIC DISEASE MONITORING Internal Lab or Procedure External Lab or Procedure      Annual Monitoring of Persistent     Medications (ACE/ARB, digoxin diuretics, anticonvulsants.)    Potassium  Annually Potassium (mmol/L)   Date

## 2021-06-18 ENCOUNTER — TELEPHONE (OUTPATIENT)
Dept: FAMILY MEDICINE CLINIC | Facility: CLINIC | Age: 86
End: 2021-06-18

## 2021-06-18 DIAGNOSIS — L97.509 TYPE 2 DIABETES MELLITUS WITH FOOT ULCER, WITHOUT LONG-TERM CURRENT USE OF INSULIN (HCC): Primary | ICD-10-CM

## 2021-06-18 DIAGNOSIS — E11.9 TYPE 2 DIABETES MELLITUS WITHOUT COMPLICATION, WITHOUT LONG-TERM CURRENT USE OF INSULIN (HCC): ICD-10-CM

## 2021-06-18 DIAGNOSIS — E11.621 TYPE 2 DIABETES MELLITUS WITH FOOT ULCER, WITHOUT LONG-TERM CURRENT USE OF INSULIN (HCC): Primary | ICD-10-CM

## 2021-06-18 RX ORDER — LANCETS 33 GAUGE
EACH MISCELLANEOUS
Qty: 200 EACH | Refills: 3 | Status: SHIPPED | OUTPATIENT
Start: 2021-06-18 | End: 2021-06-18

## 2021-06-18 RX ORDER — LANCETS 33 GAUGE
EACH MISCELLANEOUS
Qty: 200 EACH | Refills: 3 | Status: SHIPPED | OUTPATIENT
Start: 2021-06-18 | End: 2021-09-21 | Stop reason: CLARIF

## 2021-06-18 RX ORDER — LANCETS 33 GAUGE
EACH MISCELLANEOUS
Qty: 200 EACH | Refills: 3 | Status: SHIPPED
Start: 2021-06-18 | End: 2021-06-18

## 2021-07-01 NOTE — H&P
HPI:     Rehan Berrios is a 80year old male with a PMH of HTN, DM, GIB, GERD, CAD, colitis. He presents as a consult from Dr Nielsen's office to establish care.     He is slightly confused on exam which his neighbor who is with him reports is his jose carlos given the lightheadedness noted since starting flomax. He will increase water intake, start rapaflo, stop flomax. Check PSA. Myrbetriq. F/u in 6-8 weeks with PVR and symptom check.       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 (Patient not taking: Reported on 7/12/2021 ) 12 g 0   • Mirabegron ER (MYRBETRIQ) 50 MG Oral Tablet 24 Hr Take by mouth.   (Patient not taking: Reported on 7/12/2021 )     • Magnesium 100 MG Oral Tab Take 2

## 2021-07-07 RX ORDER — FUROSEMIDE 20 MG/1
TABLET ORAL
Qty: 270 TABLET | Refills: 2 | Status: SHIPPED | OUTPATIENT
Start: 2021-07-07 | End: 2021-11-08

## 2021-07-07 RX ORDER — GLIPIZIDE 2.5 MG/1
5 TABLET, EXTENDED RELEASE ORAL 2 TIMES DAILY
Qty: 360 TABLET | Refills: 2 | Status: SHIPPED | OUTPATIENT
Start: 2021-07-07

## 2021-07-07 RX ORDER — POTASSIUM CHLORIDE 1500 MG/1
1 TABLET, FILM COATED, EXTENDED RELEASE ORAL 2 TIMES DAILY
Qty: 60 TABLET | Refills: 2 | Status: SHIPPED | OUTPATIENT
Start: 2021-07-07 | End: 2021-11-08

## 2021-07-12 ENCOUNTER — OFFICE VISIT (OUTPATIENT)
Dept: SURGERY | Facility: CLINIC | Age: 86
End: 2021-07-12
Payer: MEDICARE

## 2021-07-12 ENCOUNTER — LAB ENCOUNTER (OUTPATIENT)
Dept: LAB | Facility: HOSPITAL | Age: 86
End: 2021-07-12
Attending: UROLOGY
Payer: MEDICARE

## 2021-07-12 DIAGNOSIS — R39.12 WEAK URINARY STREAM: ICD-10-CM

## 2021-07-12 DIAGNOSIS — C61 PROSTATE CANCER (HCC): Primary | ICD-10-CM

## 2021-07-12 DIAGNOSIS — C61 PROSTATE CANCER (HCC): ICD-10-CM

## 2021-07-12 DIAGNOSIS — R35.0 URINARY FREQUENCY: ICD-10-CM

## 2021-07-12 LAB
APPEARANCE: CLEAR
BILIRUBIN: NEGATIVE
GLUCOSE (URINE DIPSTICK): NEGATIVE MG/DL
KETONES (URINE DIPSTICK): NEGATIVE MG/DL
LEUKOCYTES: NEGATIVE
MULTISTIX LOT#: ABNORMAL NUMERIC
NITRITE, URINE: NEGATIVE
PH, URINE: 5.5 (ref 4.5–8)
PROTEIN (URINE DIPSTICK): NEGATIVE MG/DL
PSA SERPL-MCNC: <0.01 NG/ML (ref ?–4)
SPECIFIC GRAVITY: 1.03 (ref 1–1.03)
URINE-COLOR: YELLOW
UROBILINOGEN,SEMI-QN: 0.2 MG/DL (ref 0–1.9)

## 2021-07-12 PROCEDURE — 51798 US URINE CAPACITY MEASURE: CPT | Performed by: UROLOGY

## 2021-07-12 PROCEDURE — 81002 URINALYSIS NONAUTO W/O SCOPE: CPT | Performed by: UROLOGY

## 2021-07-12 PROCEDURE — 36415 COLL VENOUS BLD VENIPUNCTURE: CPT

## 2021-07-12 PROCEDURE — 84153 ASSAY OF PSA TOTAL: CPT

## 2021-07-12 PROCEDURE — 99204 OFFICE O/P NEW MOD 45 MIN: CPT | Performed by: UROLOGY

## 2021-07-12 RX ORDER — SILODOSIN 8 MG/1
8 CAPSULE ORAL EVERY EVENING
Qty: 30 CAPSULE | Refills: 11 | Status: SHIPPED | OUTPATIENT
Start: 2021-07-12 | End: 2021-08-11

## 2021-07-12 RX ORDER — TAMSULOSIN HYDROCHLORIDE 0.4 MG/1
0.4 CAPSULE ORAL EVERY EVENING
Qty: 90 CAPSULE | Refills: 6 | Status: SHIPPED | OUTPATIENT
Start: 2021-07-12 | End: 2021-07-12

## 2021-07-12 RX ORDER — MIRABEGRON 50 MG/1
50 TABLET, FILM COATED, EXTENDED RELEASE ORAL DAILY
Qty: 30 TABLET | Refills: 11 | Status: SHIPPED | OUTPATIENT
Start: 2021-07-12

## 2021-07-12 NOTE — PROGRESS NOTES
Results reviewed. PSA is undetectable which is great news. No changes to plan as we discussed today. Please let patient know.     Thank you,  MPH

## 2021-07-12 NOTE — PATIENT INSTRUCTIONS
1. Increase water intake to 40-60 ounces (2 liters) per day or enough to keep urine clear to pale yellow. 2. Cut back on dietary irritants such as coffee, tea, soda, juice. Read through dietary irritant handout. 3. Start 50 mg myrbetriq.   4. Stop flomax

## 2021-07-13 ENCOUNTER — TELEPHONE (OUTPATIENT)
Dept: SURGERY | Facility: CLINIC | Age: 86
End: 2021-07-13

## 2021-07-13 ENCOUNTER — OFFICE VISIT (OUTPATIENT)
Dept: PODIATRY CLINIC | Facility: CLINIC | Age: 86
End: 2021-07-13
Payer: MEDICARE

## 2021-07-13 DIAGNOSIS — M21.6X1 PLANTAR FLEXED METATARSAL BONE OF RIGHT FOOT: ICD-10-CM

## 2021-07-13 DIAGNOSIS — M20.42 HAMMER TOES OF BOTH FEET: ICD-10-CM

## 2021-07-13 DIAGNOSIS — M21.6X2 PLANTAR FLEXED METATARSAL BONE OF LEFT FOOT: Primary | ICD-10-CM

## 2021-07-13 DIAGNOSIS — I73.89 OTHER SPECIFIED PERIPHERAL VASCULAR DISEASES (HCC): ICD-10-CM

## 2021-07-13 DIAGNOSIS — L84 CALLUS OF FOOT: ICD-10-CM

## 2021-07-13 DIAGNOSIS — L84 HELOMA DURUM: ICD-10-CM

## 2021-07-13 DIAGNOSIS — B35.1 ONYCHOMYCOSIS: ICD-10-CM

## 2021-07-13 DIAGNOSIS — E11.42 DIABETIC POLYNEUROPATHY ASSOCIATED WITH TYPE 2 DIABETES MELLITUS (HCC): ICD-10-CM

## 2021-07-13 DIAGNOSIS — M20.41 HAMMER TOES OF BOTH FEET: ICD-10-CM

## 2021-07-13 PROCEDURE — 11721 DEBRIDE NAIL 6 OR MORE: CPT | Performed by: PODIATRIST

## 2021-07-13 NOTE — TELEPHONE ENCOUNTER
LMTCB in regards to msg below   ----- Message from Mireya Garcia MD sent at 7/12/2021  5:22 PM CDT -----  Results reviewed. PSA is undetectable which is great news. No changes to plan as we discussed today. Please let patient know.     Thank you,  MPH

## 2021-07-13 NOTE — PROGRESS NOTES
Sarthak Brown is a 80year old male. Patient presents with:  Toenail Care: routine nail care. states left hallux is long. denies any pain at this time.         HPI:   This pleasant gentleman returns to the clinic complaining of his toes hurting him on his l (OCUVITE EXTRA OR) Take by mouth. • Mirabegron ER (MYRBETRIQ) 50 MG Oral Tablet 24 Hr Take by mouth. • Aspirin 81 MG Oral Tab Take 81 mg by mouth daily. • Multiple Vitamins-Minerals (CENTRUM SILVER) Oral Tab Take 1 Tab by mouth daily.      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 3. Neurologic: The patient has diminished pedal sensation does have diabetic peripheral neuropathy   4.  Musculoskeletal: Patient does have mild hallux valgus on the le

## 2021-07-19 NOTE — TELEPHONE ENCOUNTER
I attempted to call the patient, but the phone cut out during the conversation. When I called him back I left a message regarding [facility] rec:  \"              Unfortunately if his insurance won't cover then we won't have any other options.  He could take this

## 2021-07-19 NOTE — TELEPHONE ENCOUNTER
I called the pharmacy and discussed message from MPH:    '              Unfortunately if his insurance won't cover then we won't have any other options. He could take this medicine every other day to cut cost or just not take if it's going to be too much.

## 2021-07-21 ENCOUNTER — TELEPHONE (OUTPATIENT)
Dept: SURGERY | Facility: CLINIC | Age: 86
End: 2021-07-21

## 2021-07-21 NOTE — TELEPHONE ENCOUNTER
See encounter 7/13/2021    Per MPH:    \"Unfortunately if his insurance won't cover then we won't have any other options. He could take this medicine every other day to cut cost or just not take if it's going to be too much.  The other medications that help

## 2021-08-25 RX ORDER — LOSARTAN POTASSIUM 100 MG/1
100 TABLET ORAL
Qty: 90 TABLET | Refills: 3 | Status: SHIPPED | OUTPATIENT
Start: 2021-08-25

## 2021-09-01 RX ORDER — SIMVASTATIN 10 MG
10 TABLET ORAL DAILY
Qty: 90 TABLET | Refills: 0 | Status: SHIPPED | OUTPATIENT
Start: 2021-09-01 | End: 2022-01-10

## 2021-09-01 NOTE — TELEPHONE ENCOUNTER
Patient stopped in office to request refill of SIMVASTATIN 10 MG Oral Tab. Send to [facility], [address] [place] [address] Luis E Sommers [phone], [phone]    Please call patient when script has been sent to pharmacy.

## 2021-09-07 ENCOUNTER — HOSPITAL ENCOUNTER (INPATIENT)
Facility: HOSPITAL | Age: 86
LOS: 2 days | Discharge: NURSING, REHAB OR LONG-TERM CARE | DRG: 084 | End: 2021-09-09
Attending: EMERGENCY MEDICINE | Admitting: INTERNAL MEDICINE
Payer: MEDICARE

## 2021-09-07 ENCOUNTER — APPOINTMENT (OUTPATIENT)
Dept: CT IMAGING | Facility: HOSPITAL | Age: 86
DRG: 084 | End: 2021-09-07
Attending: PHYSICIAN ASSISTANT
Payer: MEDICARE

## 2021-09-07 DIAGNOSIS — I60.9 SUBARACHNOID HEMORRHAGE (HCC): Primary | ICD-10-CM

## 2021-09-07 DIAGNOSIS — S16.1XXA STRAIN OF NECK MUSCLE, INITIAL ENCOUNTER: ICD-10-CM

## 2021-09-07 LAB
ALBUMIN SERPL-MCNC: 3.2 G/DL (ref 3.4–5)
ALBUMIN/GLOB SERPL: 0.9 {RATIO} (ref 1–2)
ALP LIVER SERPL-CCNC: 103 U/L
ALT SERPL-CCNC: 38 U/L
ANION GAP SERPL CALC-SCNC: 6 MMOL/L (ref 0–18)
ANTIBODY SCREEN: NEGATIVE
APTT PPP: 30.2 SECONDS (ref 25.4–36.1)
AST SERPL-CCNC: 22 U/L (ref 15–37)
BASOPHILS # BLD AUTO: 0.02 X10(3) UL (ref 0–0.2)
BASOPHILS NFR BLD AUTO: 0.2 %
BILIRUB SERPL-MCNC: 0.7 MG/DL (ref 0.1–2)
BUN BLD-MCNC: 14 MG/DL (ref 7–18)
CALCIUM BLD-MCNC: 8.8 MG/DL (ref 8.5–10.1)
CHLORIDE SERPL-SCNC: 111 MMOL/L (ref 98–112)
CO2 SERPL-SCNC: 23 MMOL/L (ref 21–32)
CREAT BLD-MCNC: 0.86 MG/DL
EOSINOPHIL # BLD AUTO: 0.15 X10(3) UL (ref 0–0.7)
EOSINOPHIL NFR BLD AUTO: 1.7 %
ERYTHROCYTE [DISTWIDTH] IN BLOOD BY AUTOMATED COUNT: 11.9 %
GLOBULIN PLAS-MCNC: 3.6 G/DL (ref 2.8–4.4)
GLUCOSE BLD-MCNC: 157 MG/DL (ref 70–99)
GLUCOSE BLD-MCNC: 77 MG/DL (ref 70–99)
GLUCOSE BLD-MCNC: 80 MG/DL (ref 70–99)
HCT VFR BLD AUTO: 45 %
HGB BLD-MCNC: 15.4 G/DL
IMM GRANULOCYTES # BLD AUTO: 0.04 X10(3) UL (ref 0–1)
IMM GRANULOCYTES NFR BLD: 0.4 %
INR BLD: 0.97 (ref 0.89–1.11)
LYMPHOCYTES # BLD AUTO: 2.01 X10(3) UL (ref 1–4)
LYMPHOCYTES NFR BLD AUTO: 22.6 %
M PROTEIN MFR SERPL ELPH: 6.8 G/DL (ref 6.4–8.2)
MCH RBC QN AUTO: 29.7 PG (ref 26–34)
MCHC RBC AUTO-ENTMCNC: 34.2 G/DL (ref 31–37)
MCV RBC AUTO: 86.7 FL
MONOCYTES # BLD AUTO: 0.82 X10(3) UL (ref 0.1–1)
MONOCYTES NFR BLD AUTO: 9.2 %
NEUTROPHILS # BLD AUTO: 5.86 X10 (3) UL (ref 1.5–7.7)
NEUTROPHILS # BLD AUTO: 5.86 X10(3) UL (ref 1.5–7.7)
NEUTROPHILS NFR BLD AUTO: 65.9 %
OSMOLALITY SERPL CALC.SUM OF ELEC: 289 MOSM/KG (ref 275–295)
PLATELET # BLD AUTO: 242 10(3)UL (ref 150–450)
POTASSIUM SERPL-SCNC: 3.9 MMOL/L (ref 3.5–5.1)
PSA SERPL DL<=0.01 NG/ML-MCNC: 13.1 SECONDS (ref 12.2–14.5)
RBC # BLD AUTO: 5.19 X10(6)UL
RH BLOOD TYPE: NEGATIVE
SARS-COV-2 RNA RESP QL NAA+PROBE: NOT DETECTED
SODIUM SERPL-SCNC: 140 MMOL/L (ref 136–145)
WBC # BLD AUTO: 8.9 X10(3) UL (ref 4–11)

## 2021-09-07 PROCEDURE — 72125 CT NECK SPINE W/O DYE: CPT | Performed by: PHYSICIAN ASSISTANT

## 2021-09-07 PROCEDURE — 99223 1ST HOSP IP/OBS HIGH 75: CPT | Performed by: INTERNAL MEDICINE

## 2021-09-07 PROCEDURE — 70450 CT HEAD/BRAIN W/O DYE: CPT | Performed by: PHYSICIAN ASSISTANT

## 2021-09-07 RX ORDER — HYDROMORPHONE HYDROCHLORIDE 1 MG/ML
0.5 INJECTION, SOLUTION INTRAMUSCULAR; INTRAVENOUS; SUBCUTANEOUS EVERY 30 MIN PRN
Status: ACTIVE | OUTPATIENT
Start: 2021-09-07 | End: 2021-09-08

## 2021-09-07 RX ORDER — SODIUM CHLORIDE 9 MG/ML
INJECTION, SOLUTION INTRAVENOUS CONTINUOUS
Status: ACTIVE | OUTPATIENT
Start: 2021-09-07 | End: 2021-09-08

## 2021-09-07 RX ORDER — ONDANSETRON 2 MG/ML
4 INJECTION INTRAMUSCULAR; INTRAVENOUS EVERY 4 HOURS PRN
Status: ACTIVE | OUTPATIENT
Start: 2021-09-07 | End: 2021-09-08

## 2021-09-07 RX ORDER — TAMSULOSIN HYDROCHLORIDE 0.4 MG/1
0.4 CAPSULE ORAL DAILY
COMMUNITY
End: 2021-09-21 | Stop reason: CLARIF

## 2021-09-07 NOTE — CONSULTS
Full consult to follow tomorrow. 89YM presents to ED today with chief complaint of headaches and neck pain. He states that he tripped fall yesterday. Pt denies hitting his head or any other injuries: no LOC.     Head CT:  A small focal 6 mm subarachnoid

## 2021-09-07 NOTE — ED PROVIDER NOTES
Patient Seen in: [facility] Emergency Department      History   Patient presents with:  Head Neck Injury    Stated Complaint: necl pain     HPI/Subjective:   HPI    66-year-old male presents to emergency department for accidental fall yesterday whil Hypertonicity of bladder    • Intervertebral lumbar disc disorder with myelopathy, lumbar region    • Lesion of plantar nerve 6/29/2012   • Malignant neoplasm of prostate ([facility] 6/29/2012   • Neuropathy    • Nocturnal leg cramps 11/7/2013   • Other abnormal necl pain   Other systems are as noted in HPI. Constitutional and vital signs reviewed. All other systems reviewed and negative except as noted above.     Physical Exam     ED Triage Vitals [09/07/21 1455]   /79   Pulse 75   Resp 18   Temp 98.1 who the president is (\"does not pay attention to that\"). Mildly antalgic gait, patient states thats how he ambulates, slowly.    Psychiatric:         Mood and Affect: Mood normal.         Behavior: Behavior normal.             ED Course     Labs Review thinners. Patient states the lateral sides of his neck have been sore, is worse with rotation, better when is not rotating.   Patient states he thought it get better since this happened yesterday and he called the nonemergency line and they called an ambul

## 2021-09-07 NOTE — ED INITIAL ASSESSMENT (HPI)
89YM c/c of neck injury Pt state that he tripped fall yesterday  injury his neck.  Pt denies hitting his head or any other injuries

## 2021-09-08 ENCOUNTER — APPOINTMENT (OUTPATIENT)
Dept: CT IMAGING | Facility: HOSPITAL | Age: 86
DRG: 084 | End: 2021-09-08
Attending: NEUROLOGICAL SURGERY
Payer: MEDICARE

## 2021-09-08 LAB
EST. AVERAGE GLUCOSE BLD GHB EST-MCNC: 140 MG/DL (ref 68–126)
GLUCOSE BLD-MCNC: 101 MG/DL (ref 70–99)
GLUCOSE BLD-MCNC: 141 MG/DL (ref 70–99)
GLUCOSE BLD-MCNC: 143 MG/DL (ref 70–99)
GLUCOSE BLD-MCNC: 144 MG/DL (ref 70–99)
GLUCOSE BLD-MCNC: 147 MG/DL (ref 70–99)
HBA1C MFR BLD HPLC: 6.5 % (ref ?–5.7)

## 2021-09-08 PROCEDURE — 99232 SBSQ HOSP IP/OBS MODERATE 35: CPT | Performed by: INTERNAL MEDICINE

## 2021-09-08 PROCEDURE — 70450 CT HEAD/BRAIN W/O DYE: CPT | Performed by: NEUROLOGICAL SURGERY

## 2021-09-08 RX ORDER — DEXTROSE MONOHYDRATE 25 G/50ML
50 INJECTION, SOLUTION INTRAVENOUS
Status: DISCONTINUED | OUTPATIENT
Start: 2021-09-08 | End: 2021-09-09

## 2021-09-08 RX ORDER — PRAVASTATIN SODIUM 20 MG
20 TABLET ORAL NIGHTLY
Refills: 0 | Status: DISCONTINUED | OUTPATIENT
Start: 2021-09-08 | End: 2021-09-09

## 2021-09-08 RX ORDER — TROLAMINE SALICYLATE 10 G/100G
CREAM TOPICAL 3 TIMES DAILY PRN
Status: DISCONTINUED | OUTPATIENT
Start: 2021-09-08 | End: 2021-09-09

## 2021-09-08 RX ORDER — LOSARTAN POTASSIUM 100 MG/1
100 TABLET ORAL
Status: DISCONTINUED | OUTPATIENT
Start: 2021-09-08 | End: 2021-09-09

## 2021-09-08 RX ORDER — ACETAMINOPHEN 325 MG/1
650 TABLET ORAL EVERY 6 HOURS PRN
Status: DISCONTINUED | OUTPATIENT
Start: 2021-09-08 | End: 2021-09-09

## 2021-09-08 RX ORDER — METOCLOPRAMIDE HYDROCHLORIDE 5 MG/ML
10 INJECTION INTRAMUSCULAR; INTRAVENOUS EVERY 8 HOURS PRN
Status: DISCONTINUED | OUTPATIENT
Start: 2021-09-08 | End: 2021-09-09

## 2021-09-08 RX ORDER — TAMSULOSIN HYDROCHLORIDE 0.4 MG/1
0.4 CAPSULE ORAL DAILY
Status: DISCONTINUED | OUTPATIENT
Start: 2021-09-08 | End: 2021-09-09

## 2021-09-08 RX ORDER — LABETALOL HYDROCHLORIDE 5 MG/ML
10 INJECTION, SOLUTION INTRAVENOUS EVERY 6 HOURS PRN
Status: DISCONTINUED | OUTPATIENT
Start: 2021-09-08 | End: 2021-09-09

## 2021-09-08 RX ORDER — ONDANSETRON 2 MG/ML
4 INJECTION INTRAMUSCULAR; INTRAVENOUS EVERY 6 HOURS PRN
Status: DISCONTINUED | OUTPATIENT
Start: 2021-09-08 | End: 2021-09-09

## 2021-09-08 RX ORDER — HYDRALAZINE HYDROCHLORIDE 20 MG/ML
10 INJECTION INTRAMUSCULAR; INTRAVENOUS EVERY 6 HOURS PRN
Status: DISCONTINUED | OUTPATIENT
Start: 2021-09-08 | End: 2021-09-09

## 2021-09-08 NOTE — PLAN OF CARE
Assumed care 2245  Patient alert, oriented x4 forgetful  Patient and family poor historian- medication list completed as able  No neuro deficits, R leg 5/5 but slightly weaker than L- patient states previous injury  BP elevated, physician on call made awar

## 2021-09-08 NOTE — OCCUPATIONAL THERAPY NOTE
OCCUPATIONAL THERAPY EVALUATION - INPATIENT     Room Number: 3800/8287-J  Evaluation Date: 9/8/2021  Type of Evaluation: Initial  Presenting Problem: fall, 1 Juanpablo Vasquez    Physician Order: IP Consult to Occupational Therapy  Reason for Therapy: ADL/IADL Dysfunction foot [facility] 1/15/2020   • Hyperglycemia 10/27/2019   • Hypertension 1/17/2013   • Hypertension complications 7/27/2152   • Hypertonicity of bladder    • Intervertebral lumbar disc disorder with myelopathy, lumbar region    • Lesion of plantar nerve 6/29/201 [address]   • PATIENT DOCUMENTED NOT TO HAVE EXPERIENCED ANY OF THE FOLLOWING EVENTS N/A 2/25/2016    Procedure: CYSTOSCOPY WITH URETHRAL DILATION;  Surgeon: Dustin Ricci MD;  Location: [address] [place] [address]   • PATIENT WITH PREOPERATIVE functional limits     COORDINATION  Gross Motor    WFL    Fine Motor    WFL      ADDITIONAL TESTS                                    NEUROLOGICAL FINDINGS  Neurological Findings: None                ACTIVITY TOLERANCE                         O2 SATURATIONS Patient End of Session: Up in chair;Needs met;Call light within reach; All patient questions and concerns addressed; Alarm set    ASSESSMENT   Patient is a 80year old  male admitted 9/7/2021 for fall with SAH.  Complete medical history and occupational pro Profile/Medical History LOW - Brief history including review of medical or therapy records    Specific performance deficits impacting engagement in ADL/IADL LOW  1 - 3 performance deficits    Client Assessment/Performance Deficits LOW - No comorbidities no

## 2021-09-08 NOTE — CONSULTS
[facility]  Neurological Surgery Inpatient Consult Note    Marco A Ornelas, 80year old male Patient Status:  Inpatient    1927 MRN [id number]   [facility] 7NE-A Attending Jerica Cameron MD   Hosp Day # 1 PCP Richar Cook History:   Diagnosis Date   • Accelerated hypertension 6/19/2014   • Acute colitis 2/27/2016   • Aortic atherosclerosis ([address] 12/25/2018    CT abdomen 2/27/16   • Arthritis of low back 12/25/2018    Lumbar DDD CXR 8/17/18   • Atypical mole 1/17/2013   • Be Rotator cuff sprain    • [facility]    • Stress incontinence, male 5/16/2019   • Type 2 acromioclavicular joint separation 5/20/2013   • Type 2 diabetes mellitus with foot ulcer, without long-term current use of insulin ([facility] 8/26/2014   • Type II use. He reports that he does not use drugs. ALLERGIES:    Ace Inhibitors          Coughing  Augmentin [Amoxicil*    DIARRHEA    MEDICATIONS:  simvastatin 10 MG Oral Tab, Take 1 tablet (10 mg total) by mouth daily. , Disp: 90 tablet, Rfl: 0  losartan 100 at Unknown time  Multiple Vitamins-Minerals (CENTRUM SILVER) Oral Tab, Take 1 Tab by mouth daily. , Disp: , Rfl: , Unknown at Unknown time  Magnesium 100 MG Oral Tab, Take 250 mg by mouth daily.  Take two tablets daily , Disp: , Rfl: , Unknown at Unknown efra to follow simple commands. Pupils equally round and reactive to light. 3+ brisk bilaterally. EOMI. Visual fields are full. Face is symmetrical. Tongue is midline. Very hard of hearing.     Rodney Hunt MD, [facility]  Neurological Surgeon  Best Case

## 2021-09-08 NOTE — ED PROVIDER NOTES
I reviewed that chart and discussed the case. I have examined the patient and noted extract muscles intact, pupils equal round reactive to light. Head atraumatic.   Mild tenderness mid cervical region, no crepitus or step-off.  5-5 strength upper lower ex

## 2021-09-08 NOTE — PLAN OF CARE
dtr at bedside. Pt and dtr now agreeable to [facility]. Dr. Madhu Pinto updated.  Plan for SW to arrange for [facility] tomorrow

## 2021-09-08 NOTE — PLAN OF CARE
Assumed care @ 0730  No neuro deficits noted  Repeat CT completed  Daughter updated via phone    Plan for dc pending PT eval

## 2021-09-08 NOTE — PHYSICAL THERAPY NOTE
PHYSICAL THERAPY EVALUATION - INPATIENT     Room Number: 1442/5559-Q  Evaluation Date: 9/8/2021  Type of Evaluation: Initial  Physician Order: PT Eval and Treat    Presenting Problem: SAH s/p fall   Reason for Therapy: Mobility Dysfunction and Discha Dermatophytosis of the body    • Diabetes mellitus type 2 in nonobese [facility])    • Diabetes type 2 with atherosclerosis of arteries of extremities ([facility] 6/29/2012   • Dyslipidemia    • Elevated liver enzymes 11/7/2013   • Esophageal reflux    • Essential hyp Laterality Date   • AMPUTATION TOE,I-P JT     • ANESTHESIA FOR EAR SURGERY  age 8   • CABG  01/01/2007    CABG x4   • CATARACT Bilateral    • CIRCUMCISION,OTHR N/A 2/25/2016    Procedure: CIRCUMCISION ADULT;  Surgeon: Christie Garcia MD;  Location: [facility] S ASSESSMENT  Rating: Unable to rate  Location: R side of neck   Management Techniques: Activity promotion; Body mechanics; Relaxation;Repositioning    COGNITION  · Orientation Level:  oriented x4  · Following Commands:  follows one step commands with increase (e.g., wheelchair, bedside commode, etc.): A Little   -   Moving from lying on back to sitting on the side of the bed?: A Little   How much help from another person does the patient currently need. ..   -   Moving to and from a bed to a chair (including a w to him, not abandon the RW, and to keep both hands on the RW. Pt ascended and descended 2 steps with use of 1 rail, min assist, step to gait pattern, and increased unsteadiness and 2 instances of not properly stepping on the step.  See OT note for bathroom to demonstrate supine - sit EOB @ level: modified independent     Goal #2 Patient is able to demonstrate transfers Sit to/from Stand at assistance level: modified independent     Goal #3 Patient is able to ambulate 200 feet with assist device: walker - rol

## 2021-09-08 NOTE — CM/SW NOTE
Pt admitted for fall at home and has a SAH. PT saw pt and is recommending [facility]. ANGELITA Nelson spoke with dtr who says she wants to take pt home and get [facility]. Referral for [facility] sent in [place] - waiting for a response. HHO/F2F written.

## 2021-09-08 NOTE — PROGRESS NOTES
[facility]  Progress Note     Irma Olvera Patient Status:  Inpatient    1927 MRN [id number]   [facility] Attending Yolanda Vidal MD   Hosp Day # 1 PCP Hilda Alexander MD     Chief Complaint: fall  S:   Denies headac set up w/ [facility]. They can f/u w/ PCP if they decide on [facility]. Pt had to stop call as had to drop something off said would call me back   She wants to take him home       Quality:  · DVT Prophylaxis: SCDs  · CODE status:  Ful

## 2021-09-08 NOTE — H&P
[facility]  History and Physical     Que Campmagda Patient Status:  Inpatient    1927 MRN [id number]   [facility] 7NE-A Attending Vinh Suarez MD   Hosp Day # 1 PCP Moshe Rubinstein, MD     Chief Complaint: Neck pain    History type of vessel, native or graft    • Dermatophytosis of foot    • Dermatophytosis of the body    • Diabetes mellitus type 2 in nonobese [facility])    • Diabetes type 2 with atherosclerosis of arteries of extremities ([facility] 6/29/2012   • Dyslipidemia    • Elevate Past Surgical History:   Past Surgical History:   Procedure Laterality Date   • AMPUTATION TOE,I-P JT     • ANESTHESIA FOR EAR SURGERY  age 8   • CABG  01/01/2007    CABG x4   • CATARACT Bilateral    • CIRCUMCISION,OTHR N/A 2/25/2016    Procedure: CIR times daily. , Disp: 360 tablet, Rfl: 2  tamsulosin (FLOMAX) cap, Take 0.4 mg by mouth daily. , Disp: , Rfl:   Mirabegron ER (MYRBETRIQ) 50 MG Oral Tablet 24 Hr, Take 50 mg by mouth daily. , Disp: 30 tablet, Rfl: 11  Potassium Chloride ER 20 MEQ Oral Tab CR, auscultation bilaterally. No wheezes. No rhonchi. Cardiovascular: S1, S2. Regular rate and rhythm. No murmurs, rubs or gallops. Equal pulses. Chest and Back: No tenderness or deformity. Abdomen: Soft, nontender, nondistended. Positive bowel sounds.  No SCDs  · CODE status: Full  · Pérez: no  · If COVID testing is negative, may discontinue isolation: yes     Plan of care discussed with patient/RN    Leatha Mckeon DO  7/6/1923          **Certification      PHYSICIAN Certification of Need for  Shyame Juliana

## 2021-09-09 VITALS
TEMPERATURE: 98 F | WEIGHT: 184.94 LBS | RESPIRATION RATE: 18 BRPM | HEART RATE: 80 BPM | BODY MASS INDEX: 30 KG/M2 | OXYGEN SATURATION: 96 % | DIASTOLIC BLOOD PRESSURE: 62 MMHG | SYSTOLIC BLOOD PRESSURE: 123 MMHG

## 2021-09-09 LAB
ATRIAL RATE: 64 BPM
GLUCOSE BLD-MCNC: 131 MG/DL (ref 70–99)
GLUCOSE BLD-MCNC: 315 MG/DL (ref 70–99)
GLUCOSE BLD-MCNC: 352 MG/DL (ref 70–99)
GLUCOSE BLD-MCNC: 91 MG/DL (ref 70–99)
P AXIS: 52 DEGREES
P-R INTERVAL: 164 MS
Q-T INTERVAL: 474 MS
QRS DURATION: 144 MS
QTC CALCULATION (BEZET): 489 MS
R AXIS: 0 DEGREES
T AXIS: 43 DEGREES
VENTRICULAR RATE: 64 BPM

## 2021-09-09 PROCEDURE — 99239 HOSP IP/OBS DSCHRG MGMT >30: CPT | Performed by: INTERNAL MEDICINE

## 2021-09-09 NOTE — CM/SW NOTE
Spoke with pt's dtr reagarding the accepting [facility] choices - offered to email the lst to her but she preferred to discuss the choices over the phone. After discussing each facility, pt's dtr has chosen [facility] for [employer].   Called Daxa at [facility] to co

## 2021-09-09 NOTE — CM/SW NOTE
Spoke with pt's dtr Yady Knight who is now agreeable to having pt to go to [facility]. Referrals sent for [facility] in [place] - waiting for responses. DON screen requested. Pt can be dc'd today once dtr chooses [facility].

## 2021-09-09 NOTE — CM/SW NOTE
09/09/21 1621   Discharge disposition   Expected discharge disposition [address] [facility]   Discharge transportation [facility]

## 2021-09-09 NOTE — PROGRESS NOTES
Assumed care @ 1930. Pt a/o x4, forgetful occasionally. VSS. Not on Tele. No acute respiratory distress noted. Denies any pain. Pt ambulated in the room with standby assist.  (-) BM. Pt refusing hospital gown. No other complaints made.     Cristian

## 2021-09-09 NOTE — PLAN OF CARE
NURSING DISCHARGE NOTE    Discharged Nursing home via Wheelchair. Accompanied by Support staff  Belongings Taken by patient/family.   Report called to RN at the [facility]

## 2021-09-09 NOTE — PLAN OF CARE
Assumed care @ 6216  A&Ox4, forgetful.  No further neuro deficits noted  Up to the chair  Daughter updated via phone x2    Plan for dc to [facility] [place] pending approval

## 2021-09-09 NOTE — PROGRESS NOTES
[facility]  Neurosurgery Progress Note    Chloe Napier Patient Status:  Inpatient    1927 MRN [id number]   [facility] 7NE-A Attending Cyntha Leventhal, MD   Hosp Day # 2 PCP Jose J Bolaños MD     Chief Complaint:  Patient presents slightly in size since previous study.  There is no new hemorrhage.       Chronic small vessel ischemic change and atrophy is otherwise unchanged.         Assessment:  80year old male w/ R traumatic SAH    Plan:  Marcel Trejo from neurosurgical standpoint to dischar

## 2021-09-09 NOTE — PROGRESS NOTES
[facility]  Progress Note     Rayraymadeleine Gabriel Patient Status:  Inpatient    1927 MRN [id number]   [facility] 7NE-A Attending Sushma Bailey MD   Hosp Day # 2 PCP Piter Olson MD     Chief Complaint: fall  S:    Dressed and Prophylaxis: SCDs  · CODE status: Full  · Pérez: no  If COVID testing is negative, may discontinue isolation: yes     Will the patient be referred to [facility] on discharge?: No   Estimated date of discharge:  today  Discharge is dependent on:  Home   At this

## 2021-09-10 ENCOUNTER — INITIAL APN SNF VISIT (OUTPATIENT)
Dept: INTERNAL MEDICINE CLINIC | Age: 86
End: 2021-09-10

## 2021-09-10 ENCOUNTER — NURSE ONLY (OUTPATIENT)
Dept: LAB | Age: 86
End: 2021-09-10
Attending: FAMILY MEDICINE
Payer: MEDICARE

## 2021-09-10 VITALS
SYSTOLIC BLOOD PRESSURE: 122 MMHG | RESPIRATION RATE: 18 BRPM | DIASTOLIC BLOOD PRESSURE: 57 MMHG | OXYGEN SATURATION: 93 % | TEMPERATURE: 97 F | HEART RATE: 89 BPM

## 2021-09-10 DIAGNOSIS — Z91.81 HISTORY OF FALLING: Primary | ICD-10-CM

## 2021-09-10 DIAGNOSIS — E56.9 VITAMIN DEFICIENCY: ICD-10-CM

## 2021-09-10 DIAGNOSIS — Z11.59 SPECIAL SCREENING EXAMINATION FOR VIRAL DISEASE: Primary | ICD-10-CM

## 2021-09-10 DIAGNOSIS — Z79.899 MEDICATION MANAGEMENT: ICD-10-CM

## 2021-09-10 DIAGNOSIS — R79.89 LOW SERUM VITAMIN D: ICD-10-CM

## 2021-09-10 LAB
ALBUMIN SERPL-MCNC: 3.8 G/DL (ref 3.4–5)
ALBUMIN/GLOB SERPL: 1.2 {RATIO} (ref 1–2)
ALP LIVER SERPL-CCNC: 121 U/L
ALT SERPL-CCNC: 36 U/L
ANION GAP SERPL CALC-SCNC: 6 MMOL/L (ref 0–18)
AST SERPL-CCNC: 22 U/L (ref 15–37)
BASOPHILS # BLD AUTO: 0.05 X10(3) UL (ref 0–0.2)
BASOPHILS NFR BLD AUTO: 0.6 %
BILIRUB SERPL-MCNC: 1.1 MG/DL (ref 0.1–2)
BUN BLD-MCNC: 18 MG/DL (ref 7–18)
CALCIUM BLD-MCNC: 8.8 MG/DL (ref 8.5–10.1)
CHLORIDE SERPL-SCNC: 112 MMOL/L (ref 98–112)
CO2 SERPL-SCNC: 22 MMOL/L (ref 21–32)
CREAT BLD-MCNC: 0.91 MG/DL
EOSINOPHIL # BLD AUTO: 0.23 X10(3) UL (ref 0–0.7)
EOSINOPHIL NFR BLD AUTO: 2.8 %
ERYTHROCYTE [DISTWIDTH] IN BLOOD BY AUTOMATED COUNT: 12.1 %
GLOBULIN PLAS-MCNC: 3.2 G/DL (ref 2.8–4.4)
GLUCOSE BLD-MCNC: 120 MG/DL (ref 70–99)
HCT VFR BLD AUTO: 48.6 %
HGB BLD-MCNC: 16.5 G/DL
IMM GRANULOCYTES # BLD AUTO: 0.04 X10(3) UL (ref 0–1)
IMM GRANULOCYTES NFR BLD: 0.5 %
LYMPHOCYTES # BLD AUTO: 2.33 X10(3) UL (ref 1–4)
LYMPHOCYTES NFR BLD AUTO: 28.3 %
M PROTEIN MFR SERPL ELPH: 7 G/DL (ref 6.4–8.2)
MAGNESIUM SERPL-MCNC: 2.3 MG/DL (ref 1.6–2.6)
MCH RBC QN AUTO: 29.9 PG (ref 26–34)
MCHC RBC AUTO-ENTMCNC: 34 G/DL (ref 31–37)
MCV RBC AUTO: 88 FL
MONOCYTES # BLD AUTO: 0.86 X10(3) UL (ref 0.1–1)
MONOCYTES NFR BLD AUTO: 10.5 %
NEUTROPHILS # BLD AUTO: 4.71 X10 (3) UL (ref 1.5–7.7)
NEUTROPHILS # BLD AUTO: 4.71 X10(3) UL (ref 1.5–7.7)
NEUTROPHILS NFR BLD AUTO: 57.3 %
OSMOLALITY SERPL CALC.SUM OF ELEC: 293 MOSM/KG (ref 275–295)
PLATELET # BLD AUTO: 252 10(3)UL (ref 150–450)
POTASSIUM SERPL-SCNC: 4 MMOL/L (ref 3.5–5.1)
RBC # BLD AUTO: 5.52 X10(6)UL
SARS-COV-2 RNA RESP QL NAA+PROBE: NOT DETECTED
SODIUM SERPL-SCNC: 140 MMOL/L (ref 136–145)
VIT D+METAB SERPL-MCNC: 19.5 NG/ML (ref 30–100)
WBC # BLD AUTO: 8.2 X10(3) UL (ref 4–11)

## 2021-09-10 PROCEDURE — 82306 VITAMIN D 25 HYDROXY: CPT

## 2021-09-10 PROCEDURE — 85025 COMPLETE CBC W/AUTO DIFF WBC: CPT

## 2021-09-10 PROCEDURE — 1111F DSCHRG MED/CURRENT MED MERGE: CPT | Performed by: NURSE PRACTITIONER

## 2021-09-10 PROCEDURE — 80053 COMPREHEN METABOLIC PANEL: CPT

## 2021-09-10 PROCEDURE — 83735 ASSAY OF MAGNESIUM: CPT

## 2021-09-10 PROCEDURE — 99310 SBSQ NF CARE HIGH MDM 45: CPT | Performed by: NURSE PRACTITIONER

## 2021-09-10 RX ORDER — DOCUSATE SODIUM 100 MG/1
100 CAPSULE, LIQUID FILLED ORAL DAILY PRN
COMMUNITY
End: 2021-09-21 | Stop reason: CLARIF

## 2021-09-10 NOTE — PROGRESS NOTES
Esthelagurmeet Sai  : 1927  Age 80year old  male patient is admitted to Facility: [facility] for [address] [place] [address] date:  21  Discharge date to [facility]:  21   Advanced Directive:  Full Code  Discharge Plan: Returning fortunately no need for intervention. No fracture seen in cervical neck. Patient was admitted to the floor seen by PT OT recommend subacute rehab or 24-hour supervision at home.  Discussed with daughter initially she was going to take home and consider opti body    • Diabetes mellitus type 2 in nonobese [facility])    • Diabetes type 2 with atherosclerosis of arteries of extremities ([facility]) 6/29/2012   • Dyslipidemia    • Elevated liver enzymes 11/7/2013   • Esophageal reflux    • Essential hypertension    • Flushing ANESTHESIA FOR EAR SURGERY  age 8   • CABG  01/01/2007    CABG x4   • CATARACT Bilateral    • CIRCUMCISION,OTHR N/A 2/25/2016    Procedure: CIRCUMCISION ADULT;  Surgeon: Ashley Prado MD;  Location: [address] [place] [address]   • COLONOSCOPY N/A 5/1/2017 by mouth daily. • simvastatin 10 MG Oral Tab Take 1 tablet (10 mg total) by mouth daily. 90 tablet 0   • losartan 100 MG Oral Tab Take 1 tablet (100 mg total) by mouth once daily.  90 tablet 3   • Mirabegron ER (MYRBETRIQ) 50 MG Oral Tablet 24 Hr Take 5 shortness of breath, wheezing or cough   CARDIOVASCULAR:denies chest pain, no palpitations   GI: denies nausea, vomiting, constipation, diarrhea; no rectal bleeding; no heartburn  :no dysuria, urgency or frequency; no epididymal or testicular pain; no pe ANIONGAP 6 09/10/2021    GFR 67 01/09/2018    GFRNAA 72 09/10/2021    GFRAA 83 09/10/2021    CA 8.8 09/10/2021    OSMOCALC 293 09/10/2021    ALKPHO 121 (H) 09/10/2021    AST 22 09/10/2021    ALT 36 09/10/2021    BILT 1.1 09/10/2021    TP 7.0 09/10/2021 diet  Losartan 100mg po daily   Simvastatin 10 mg p.o. daily  Potassium chloride 20 mEq p.o. twice daily  Furosemide 20mg po BID  Labs weekly in [facility]; more often if clinically indicated  Follow-up with Cardiology    Diabetes Type II (A1C 6.7 at hospital)  L

## 2021-09-10 NOTE — DISCHARGE SUMMARY
AIYANA HOSPITALIST  DISCHARGE SUMMARY     [facility] Patient Status:  Inpatient    1927 MRN [id number]   [facility] 7NE-A Attending No att. providers found   Hosp Day # 2 PCP Antonio Faustin MD     Date of Admission: 2021  Date of neurosurgery was consulted fortunately no need for intervention. No fracture seen in cervical neck. Patient was admitted to the floor seen by PT OT recommend subacute rehab or 24-hour supervision at home.  Discussed with daughter initially she was going to findings and recommendations (brief descriptions):  • Hold aspirin for 1 week then resume  • No neurosurgery follow-up needed  • PT recommended JESSICA or 24-hour supervision daughter has elected to have him placed at the [facility] for rehab  • A1c 6.7 continue strip  Refills: 3     OneTouch Ultra Strp  Generic drug: Glucose Blood      USE TO TEST BLOOD SUGAR TWICE DAILY AS DIRECTED. Quantity: 200 strip  Refills: 3     Potassium Chloride ER 20 MEQ Tbcr      Take 1 tablet by mouth 2 (two) times daily.    Quantity DISCHARGE INSTRUCTIONS: See electronic chart    Rob Burton NP  Time spent:  > 30 minutes

## 2021-09-13 ENCOUNTER — SNF VISIT (OUTPATIENT)
Dept: INTERNAL MEDICINE CLINIC | Age: 86
End: 2021-09-13

## 2021-09-13 VITALS
DIASTOLIC BLOOD PRESSURE: 78 MMHG | SYSTOLIC BLOOD PRESSURE: 157 MMHG | OXYGEN SATURATION: 97 % | BODY MASS INDEX: 27 KG/M2 | RESPIRATION RATE: 20 BRPM | HEART RATE: 78 BPM | WEIGHT: 170 LBS | TEMPERATURE: 97 F

## 2021-09-13 DIAGNOSIS — I60.9 SUBARACHNOID HEMORRHAGE (HCC): ICD-10-CM

## 2021-09-13 DIAGNOSIS — Z91.81 AT RISK FOR FALLING: ICD-10-CM

## 2021-09-13 DIAGNOSIS — I10 ESSENTIAL HYPERTENSION: ICD-10-CM

## 2021-09-13 DIAGNOSIS — I70.209 DIABETES TYPE 2 WITH ATHEROSCLEROSIS OF ARTERIES OF EXTREMITIES (HCC): ICD-10-CM

## 2021-09-13 DIAGNOSIS — E11.51 DIABETES TYPE 2 WITH ATHEROSCLEROSIS OF ARTERIES OF EXTREMITIES (HCC): ICD-10-CM

## 2021-09-13 DIAGNOSIS — Z91.81 HISTORY OF FALLING: Primary | ICD-10-CM

## 2021-09-13 PROCEDURE — 99309 SBSQ NF CARE MODERATE MDM 30: CPT | Performed by: NURSE PRACTITIONER

## 2021-09-13 NOTE — PROGRESS NOTES
[facility], 2/14/1927, 80year old, male    Chief Complaint:  Patient presents with:   Follow - Up: fall with SAH, mild neck pain,   Weakness  Pain       Subjective:    HPI:  Shannon Martinez is a 80year old male with previous medical history including HTN, CAD, alone    Medications reviewed: Yes    Diagnostics reviewed:    Lab Results   Component Value Date    WBC 8.2 09/10/2021    RBC 5.52 09/10/2021    HGB 16.5 09/10/2021    HCT 48.6 09/10/2021    MCV 88.0 09/10/2021    MCH 29.9 09/10/2021    MCHC 34.0 09/10/20 twice daily  Furosemide 20mg po BID  Labs weekly in [facility]; more often if clinically indicated  Follow-up with Cardiology     Diabetes Type II (A1C 6.7 at hospital)  LCS diet po  Accu check daily before breakfast; notify if blood glucose <70 or >200  Glipizid

## 2021-09-14 ENCOUNTER — EXTERNAL FACILITY (OUTPATIENT)
Dept: FAMILY MEDICINE CLINIC | Facility: CLINIC | Age: 86
End: 2021-09-14

## 2021-09-14 DIAGNOSIS — I60.9 SUBARACHNOID HEMORRHAGE (HCC): Primary | ICD-10-CM

## 2021-09-14 DIAGNOSIS — E55.9 VITAMIN D DEFICIENCY: ICD-10-CM

## 2021-09-14 DIAGNOSIS — M23.91 INTERNAL DERANGEMENT OF BOTH KNEES: ICD-10-CM

## 2021-09-14 DIAGNOSIS — I70.209 DIABETES TYPE 2 WITH ATHEROSCLEROSIS OF ARTERIES OF EXTREMITIES (HCC): ICD-10-CM

## 2021-09-14 DIAGNOSIS — I10 ESSENTIAL HYPERTENSION: ICD-10-CM

## 2021-09-14 DIAGNOSIS — F32.A ANXIETY AND DEPRESSION: ICD-10-CM

## 2021-09-14 DIAGNOSIS — R41.89 COGNITIVE DECLINE: ICD-10-CM

## 2021-09-14 DIAGNOSIS — K21.9 GASTROESOPHAGEAL REFLUX DISEASE WITHOUT ESOPHAGITIS: ICD-10-CM

## 2021-09-14 DIAGNOSIS — E11.42 DIABETIC POLYNEUROPATHY ASSOCIATED WITH TYPE 2 DIABETES MELLITUS (HCC): ICD-10-CM

## 2021-09-14 DIAGNOSIS — Z91.81 AT RISK FOR FALLING: ICD-10-CM

## 2021-09-14 DIAGNOSIS — I80.9 PHLEBITIS AND THROMBOPHLEBITIS: ICD-10-CM

## 2021-09-14 DIAGNOSIS — Z91.81 AT HIGH RISK FOR INJURY RELATED TO FALL: ICD-10-CM

## 2021-09-14 DIAGNOSIS — M23.92 INTERNAL DERANGEMENT OF BOTH KNEES: ICD-10-CM

## 2021-09-14 DIAGNOSIS — F41.9 ANXIETY AND DEPRESSION: ICD-10-CM

## 2021-09-14 DIAGNOSIS — E78.5 HYPERLIPIDEMIA, UNSPECIFIED HYPERLIPIDEMIA TYPE: ICD-10-CM

## 2021-09-14 DIAGNOSIS — I25.10 CAD IN NATIVE ARTERY: ICD-10-CM

## 2021-09-14 DIAGNOSIS — E11.51 DIABETES TYPE 2 WITH ATHEROSCLEROSIS OF ARTERIES OF EXTREMITIES (HCC): ICD-10-CM

## 2021-09-14 DIAGNOSIS — N28.9 ACUTE RENAL INSUFFICIENCY: ICD-10-CM

## 2021-09-14 PROCEDURE — 99306 1ST NF CARE HIGH MDM 50: CPT | Performed by: FAMILY MEDICINE

## 2021-09-16 ENCOUNTER — SNF DISCHARGE (OUTPATIENT)
Dept: INTERNAL MEDICINE CLINIC | Age: 86
End: 2021-09-16

## 2021-09-16 VITALS
TEMPERATURE: 97 F | OXYGEN SATURATION: 97 % | BODY MASS INDEX: 28 KG/M2 | WEIGHT: 173 LBS | HEART RATE: 73 BPM | RESPIRATION RATE: 18 BRPM | DIASTOLIC BLOOD PRESSURE: 73 MMHG | SYSTOLIC BLOOD PRESSURE: 153 MMHG

## 2021-09-16 DIAGNOSIS — I60.9 SUBARACHNOID HEMORRHAGE (HCC): Primary | ICD-10-CM

## 2021-09-16 DIAGNOSIS — Z91.81 AT RISK FOR FALLING: ICD-10-CM

## 2021-09-16 DIAGNOSIS — E11.51 DIABETES TYPE 2 WITH ATHEROSCLEROSIS OF ARTERIES OF EXTREMITIES (HCC): ICD-10-CM

## 2021-09-16 DIAGNOSIS — I10 ESSENTIAL HYPERTENSION: ICD-10-CM

## 2021-09-16 DIAGNOSIS — R79.89 LOW SERUM VITAMIN D: ICD-10-CM

## 2021-09-16 DIAGNOSIS — N39.3 MALE STRESS INCONTINENCE: ICD-10-CM

## 2021-09-16 DIAGNOSIS — I70.209 DIABETES TYPE 2 WITH ATHEROSCLEROSIS OF ARTERIES OF EXTREMITIES (HCC): ICD-10-CM

## 2021-09-16 PROBLEM — K92.2 GASTROINTESTINAL HEMORRHAGE: Status: RESOLVED | Noted: 2017-04-29 | Resolved: 2021-09-16

## 2021-09-16 PROBLEM — E55.9 VITAMIN D DEFICIENCY: Status: ACTIVE | Noted: 2021-09-16

## 2021-09-16 PROBLEM — R41.89 COGNITIVE DECLINE: Status: ACTIVE | Noted: 2021-09-16

## 2021-09-16 PROCEDURE — 99316 NF DSCHRG MGMT 30 MIN+: CPT | Performed by: NURSE PRACTITIONER

## 2021-09-16 NOTE — PROGRESS NOTES
Rehan Berrios, 2/14/1927, 80year old, male is being discharged from [address] [place] [address] at [address]    Date of Admission: 9/9/21    Date of Discharge: 9/19/21                                 Admitting Diagnoses:  1.  Lyssa Crumble auscultation anterior and posterior, no wheezing, no acc muscle use  CARDIOVASCULAR: S1, S2 normal, RRR; no S3, no S4;   ABDOMEN: active BS+, soft, nondistended; no visible masses; nontender, no guarding  :Deferred  MUSCULOSKELETAL: weakness generalized to evaluate and treat  JESSICA team to establish care plan meeting with patient and POA/family as appropriate  JESSICA team/ & discharge planner to assist with establishing safe discharge plan for next level of care  ELOS 14 days, improving more quick    Follow-Up with specialists as recommended. Podiatry Dr. Tristin Lakhani    TCC offered    Medication Reconciliation Completed:  Yes    [phone] spent in coordination of care in preparation for discharge.     YAMILETH Collins  9/16/2021  12:31 PM

## 2021-09-16 NOTE — PROGRESS NOTES
Arin Frazier 86 Author: Naya Olvera MD     1927 MRN [id number]   [facility]  Admission 21      Last Hospital Discharge 21 PCP Varney Holstein, [place] of Discharge 21       Date of Admission: 21 Take 1 tablet (10 mg total) by mouth daily. losartan 100 MG Oral Tab, Take 1 tablet (100 mg total) by mouth once daily. Mirabegron ER (MYRBETRIQ) 50 MG Oral Tablet 24 Hr, Take 50 mg by mouth daily.   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     He family history includes Hea Musculoskeletal:         General: Normal range of motion. Cervical back: Normal range of motion and neck supple. Lymphadenopathy:      Cervical: No cervical adenopathy. Skin:     General: Skin is warm and dry. Coloration: Skin is not pale. intracranial hemorrhage, mass or evidence of acute ischemia. SINUSES:           No sign of acute sinusitis. MASTOIDS:          No sign of acute inflammation. SKULL:             No evidence for fracture or osseous abnormality. OTHER:             None. Dictated by (CST): Rufus Hatchet, MD on 9/07/2021 at 6:12 PM     Finalized by (CST): Rufus Hatchet, MD on 9/07/2021 at 6:15 PM       CT SPINE CERVICAL (CPT=72125)    Result Date: 9/7/2021            PROCEDURE:  CT SPINE CERVICAL (CPT=72125)  COMPAR Finalized by (CST): Violeta Heredia MD on 9/07/2021 at 6:18 PM         No results found for this or any previous visit (from the past 72 hour(s)).         ASSESSMENT/ PLAN:   80year old male s/p mechanical fall with SAH, with cognitive decline:

## 2021-09-17 ENCOUNTER — NURSE ONLY (OUTPATIENT)
Dept: LAB | Age: 86
End: 2021-09-17
Attending: FAMILY MEDICINE
Payer: MEDICARE

## 2021-09-17 DIAGNOSIS — R69 DIAGNOSIS UNKNOWN: Primary | ICD-10-CM

## 2021-09-17 LAB
ALBUMIN SERPL-MCNC: 3 G/DL (ref 3.4–5)
ALBUMIN/GLOB SERPL: 1 {RATIO} (ref 1–2)
ALP LIVER SERPL-CCNC: 104 U/L
ALT SERPL-CCNC: 42 U/L
ANION GAP SERPL CALC-SCNC: 6 MMOL/L (ref 0–18)
AST SERPL-CCNC: 24 U/L (ref 15–37)
BASOPHILS # BLD AUTO: 0.03 X10(3) UL (ref 0–0.2)
BASOPHILS NFR BLD AUTO: 0.4 %
BILIRUB SERPL-MCNC: 0.7 MG/DL (ref 0.1–2)
BUN BLD-MCNC: 15 MG/DL (ref 7–18)
CALCIUM BLD-MCNC: 8.3 MG/DL (ref 8.5–10.1)
CHLORIDE SERPL-SCNC: 110 MMOL/L (ref 98–112)
CO2 SERPL-SCNC: 25 MMOL/L (ref 21–32)
CREAT BLD-MCNC: 0.81 MG/DL
EOSINOPHIL # BLD AUTO: 0.19 X10(3) UL (ref 0–0.7)
EOSINOPHIL NFR BLD AUTO: 2.6 %
ERYTHROCYTE [DISTWIDTH] IN BLOOD BY AUTOMATED COUNT: 11.9 %
GLOBULIN PLAS-MCNC: 3.1 G/DL (ref 2.8–4.4)
GLUCOSE BLD-MCNC: 62 MG/DL (ref 70–99)
HCT VFR BLD AUTO: 42 %
HGB BLD-MCNC: 14.2 G/DL
IMM GRANULOCYTES # BLD AUTO: 0.03 X10(3) UL (ref 0–1)
IMM GRANULOCYTES NFR BLD: 0.4 %
LYMPHOCYTES # BLD AUTO: 2.11 X10(3) UL (ref 1–4)
LYMPHOCYTES NFR BLD AUTO: 28.6 %
MCH RBC QN AUTO: 29.2 PG (ref 26–34)
MCHC RBC AUTO-ENTMCNC: 33.8 G/DL (ref 31–37)
MCV RBC AUTO: 86.2 FL
MONOCYTES # BLD AUTO: 0.78 X10(3) UL (ref 0.1–1)
MONOCYTES NFR BLD AUTO: 10.6 %
NEUTROPHILS # BLD AUTO: 4.24 X10 (3) UL (ref 1.5–7.7)
NEUTROPHILS # BLD AUTO: 4.24 X10(3) UL (ref 1.5–7.7)
NEUTROPHILS NFR BLD AUTO: 57.4 %
OSMOLALITY SERPL CALC.SUM OF ELEC: 291 MOSM/KG (ref 275–295)
PATIENT FASTING Y/N/NP: YES
PLATELET # BLD AUTO: 232 10(3)UL (ref 150–450)
POTASSIUM SERPL-SCNC: 3.5 MMOL/L (ref 3.5–5.1)
PROT SERPL-MCNC: 6.1 G/DL (ref 6.4–8.2)
RBC # BLD AUTO: 4.87 X10(6)UL
SODIUM SERPL-SCNC: 141 MMOL/L (ref 136–145)
WBC # BLD AUTO: 7.4 X10(3) UL (ref 4–11)

## 2021-09-17 PROCEDURE — 80053 COMPREHEN METABOLIC PANEL: CPT

## 2021-09-17 PROCEDURE — 85025 COMPLETE CBC W/AUTO DIFF WBC: CPT

## 2021-09-21 ENCOUNTER — PATIENT OUTREACH (OUTPATIENT)
Dept: CASE MANAGEMENT | Age: 86
End: 2021-09-21

## 2021-09-21 ENCOUNTER — OFFICE VISIT (OUTPATIENT)
Dept: INTERNAL MEDICINE CLINIC | Facility: CLINIC | Age: 86
End: 2021-09-21
Payer: MEDICARE

## 2021-09-21 ENCOUNTER — TELEPHONE (OUTPATIENT)
Dept: FAMILY MEDICINE CLINIC | Facility: CLINIC | Age: 86
End: 2021-09-21

## 2021-09-21 VITALS
DIASTOLIC BLOOD PRESSURE: 70 MMHG | WEIGHT: 171 LBS | HEIGHT: 66 IN | SYSTOLIC BLOOD PRESSURE: 132 MMHG | OXYGEN SATURATION: 97 % | BODY MASS INDEX: 27.48 KG/M2 | HEART RATE: 99 BPM | RESPIRATION RATE: 24 BRPM | TEMPERATURE: 98 F

## 2021-09-21 DIAGNOSIS — I60.9 SUBARACHNOID HEMORRHAGE (HCC): Primary | ICD-10-CM

## 2021-09-21 DIAGNOSIS — E11.9 TYPE 2 DIABETES MELLITUS WITHOUT COMPLICATION, WITHOUT LONG-TERM CURRENT USE OF INSULIN (HCC): ICD-10-CM

## 2021-09-21 DIAGNOSIS — N39.3 MALE STRESS INCONTINENCE: ICD-10-CM

## 2021-09-21 DIAGNOSIS — Z02.9 ENCOUNTERS FOR UNSPECIFIED ADMINISTRATIVE PURPOSE: ICD-10-CM

## 2021-09-21 DIAGNOSIS — R41.89 COGNITIVE DECLINE: ICD-10-CM
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
## 2021-09-21 PROCEDURE — 99495 TRANSJ CARE MGMT MOD F2F 14D: CPT | Performed by: NURSE PRACTITIONER

## 2021-09-21 PROCEDURE — 1111F DSCHRG MED/CURRENT MED MERGE: CPT | Performed by: NURSE PRACTITIONER

## 2021-09-21 RX ORDER — SILODOSIN 8 MG/1
8 CAPSULE ORAL DAILY
COMMUNITY

## 2021-09-21 NOTE — PROGRESS NOTES
TRANSITIONAL CARE CLINIC PHARMACIST MEDICATION RECONCILIATION        Neel Anand MRN [id number]    1927 PCP Silvia Iverson MD       Comments: Medication history completed by the [facility] Pharmacist with the patient in the office. Silodosin for his prostate/urination flow. States he doesn't know what he takes. Most recent fill shows Silodosin 8mg daily on 8/31/21. States he takes all his cardiac medications and diabetes medications as prescribed.       Reviewed all medications in

## 2021-09-22 PROBLEM — E11.9 TYPE 2 DIABETES MELLITUS WITHOUT COMPLICATION, WITHOUT LONG-TERM CURRENT USE OF INSULIN (HCC): Status: ACTIVE | Noted: 2021-09-22

## 2021-09-22 NOTE — PROGRESS NOTES
[facility]      HISTORY   CHIEF COMPLAINT: HFU-Subarachnoid hemorrhage, s/p fall, DMII controlled, stress incontinence, and cognitive decline.      HPI: Zay De Luna is a 80year old male here today for hospital f His BS in [employer] were . He has no other concerns at time of exam.     Interactive contact within 2 business days post discharge first initiated on Date: 9/21/2021      Allergies:     Ace Inhibitors          Coughing  Augmentin [Amoxicil*    DIARRHEA   Cu abdomen 2/27/16   • Arthritis of low back 12/25/2018    Lumbar DDD CXR 8/17/18   • Atypical mole 1/17/2013   • Benign essential HTN    • Bronchitis, not specified as acute or chronic    • CAD (coronary artery disease) 1/17/2013   • Cancer ([facility]     prostat Type 2 diabetes mellitus with foot ulcer, without long-term current use of insulin ([facility] 8/26/2014   • Type II or unspecified type diabetes mellitus without mention of complication, not stated as uncontrolled    • Type II or unspecified type diabetes alee No       Imaging & Consults:        Lab Results   Component Value Date/Time    WBC 7.4 09/17/2021 06:30 AM    HGB 14.2 09/17/2021 06:30 AM    HCT 42.0 09/17/2021 06:30 AM    .0 09/17/2021 06:30 AM    GLU 62 (L) 09/17/2021 06:30 AM     09/17/20 denies depression or anxiety  HEMATOLOGIC: denies history of anemia, bruising, bleeding    PHYSICAL EXAM:   09/21/21  1337   BP: 132/70   Pulse: 99   Resp: 24   Temp: 97.7 °F (36.5 °C)       GENERAL: well developed, well nourished, in no apparent distress, SAH  · Patient reports feeling comfortable with driving at this time  · Discuss driving with PCP-no recommendations per neurosurgery      Orders Placed This Encounter      silodosin 8 MG Oral Cap          Sig: Take 8 mg by mouth daily.       Medications & R Certification:  During the visit, the following was completed:  ? Obtained and reviewed discharge information  and continuity of care documents  ?  Reviewed need for follow-up on pending tests, need for follow-up on diagnostic tests and need for follow-up o

## 2021-10-08 ENCOUNTER — OFFICE VISIT (OUTPATIENT)
Dept: FAMILY MEDICINE CLINIC | Facility: CLINIC | Age: 86
End: 2021-10-08
Payer: MEDICARE

## 2021-10-08 VITALS
HEIGHT: 66 IN | BODY MASS INDEX: 28.13 KG/M2 | OXYGEN SATURATION: 98 % | RESPIRATION RATE: 16 BRPM | DIASTOLIC BLOOD PRESSURE: 50 MMHG | WEIGHT: 175 LBS | HEART RATE: 70 BPM | SYSTOLIC BLOOD PRESSURE: 98 MMHG
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
## 2021-10-08 DIAGNOSIS — R35.0 URINARY FREQUENCY: ICD-10-CM

## 2021-10-08 DIAGNOSIS — Z09 HOSPITAL DISCHARGE FOLLOW-UP: Primary | ICD-10-CM

## 2021-10-08 PROCEDURE — 1111F DSCHRG MED/CURRENT MED MERGE: CPT | Performed by: FAMILY MEDICINE

## 2021-10-08 PROCEDURE — 99213 OFFICE O/P EST LOW 20 MIN: CPT | Performed by: FAMILY MEDICINE

## 2021-10-08 PROCEDURE — G0008 ADMIN INFLUENZA VIRUS VAC: HCPCS | Performed by: FAMILY MEDICINE

## 2021-10-08 PROCEDURE — 90662 IIV NO PRSV INCREASED AG IM: CPT | Performed by: FAMILY MEDICINE

## 2021-10-08 NOTE — PATIENT INSTRUCTIONS
If still taking the furosemide reduce to once a day. Then see if you need to urinate less. Make sure to take the mirabegron daily.     Jazmín Keene MD

## 2021-10-08 NOTE — PROGRESS NOTES
[facility] Progress Note    SUBJECTIVE: Miya Ortiz 80year old male is here today for Patient presents with:  Hospital F/U: no longer c/o neck, no recent falls  Urinary Frequency      Doing well, no falls, no neck pain. Was at the SNF.  Had Hypertension 1/17/2013   • Hypertension complications 9/46/0806   • Hypertonicity of bladder    • Intervertebral lumbar disc disorder with myelopathy, lumbar region    • Lesion of plantar nerve 6/29/2012   • Malignant neoplasm of prostate ([facility] 6/29/2012 THE FOLLOWING EVENTS N/A 2/25/2016    Procedure: CYSTOSCOPY WITH URETHRAL DILATION;  Surgeon: Imani Little MD;  Location: [address] [place] [address]   • PATIENT WITH PREOPERATIVE ORDER FOR IV ANTIBIOTIC SURGICAL SITE INFECT N/A 2/25/2016    Procedure: Martín Kent Pain.     • ONETOUCH ULTRA BLUE In Vitro Strip Test 2 times daily 100 strip 3   • Multiple Vitamins-Minerals (OCUVITE EXTRA OR) Take by mouth. • Multiple Vitamins-Minerals (CENTRUM SILVER) Oral Tab Take 1 Tab by mouth daily.      • magnesium 250 MG Oral

## 2021-10-12 ENCOUNTER — OFFICE VISIT (OUTPATIENT)
Dept: PODIATRY CLINIC | Facility: CLINIC | Age: 86
End: 2021-10-12
Payer: MEDICARE

## 2021-10-12 DIAGNOSIS — E11.42 DIABETIC POLYNEUROPATHY ASSOCIATED WITH TYPE 2 DIABETES MELLITUS (HCC): ICD-10-CM

## 2021-10-12 DIAGNOSIS — M21.6X1 PLANTAR FLEXED METATARSAL BONE OF RIGHT FOOT: ICD-10-CM

## 2021-10-12 DIAGNOSIS — B35.1 ONYCHOMYCOSIS: ICD-10-CM

## 2021-10-12 DIAGNOSIS — I73.89 OTHER SPECIFIED PERIPHERAL VASCULAR DISEASES (HCC): ICD-10-CM

## 2021-10-12 DIAGNOSIS — M21.6X2 PLANTAR FLEXED METATARSAL BONE OF LEFT FOOT: Primary | ICD-10-CM

## 2021-10-12 DIAGNOSIS — M20.41 HAMMER TOES OF BOTH FEET: ICD-10-CM

## 2021-10-12 DIAGNOSIS — L84 HELOMA DURUM: ICD-10-CM

## 2021-10-12 DIAGNOSIS — M20.42 HAMMER TOES OF BOTH FEET: ICD-10-CM

## 2021-10-12 DIAGNOSIS — L84 CALLUS OF FOOT: ICD-10-CM

## 2021-10-12 PROCEDURE — 11721 DEBRIDE NAIL 6 OR MORE: CPT | Performed by: PODIATRIST

## 2021-10-12 NOTE — PROGRESS NOTES
Mary Henriquez is a 80year old male. Patient presents with:  Toenail Care: routine nail and callus care. HPI:   This pleasant gentleman returns to the clinic complaining of his toes hurting him on his left foot.  He complains of when his nails get el 12/25/2018    Lumbar DDD CXR 8/17/18   • Atypical mole 1/17/2013   • Benign essential HTN    • Bronchitis, not specified as acute or chronic    • CAD (coronary artery disease) 1/17/2013   • Cancer [facility])     prostate cancer -seed implants   • Cataract    • without long-term current use of insulin ([facility] 8/26/2014   • Type II or unspecified type diabetes mellitus without mention of complication, not stated as uncontrolled    • Type II or unspecified type diabetes mellitus without mention of complication, uncon drinks        Comment: 1 beer on occasion      Drug use: No    Other Topics      Concerns:        Caffeine Concern: Yes        Exercise: Yes          REVIEW OF SYSTEMS:   Today reviewed systens as documented below  GENERAL HEALTH: feels well otherwise  SKI of both feet    Other specified peripheral vascular diseases ([facility]    Plantar flexed metatarsal bone of right foot        Plan:  Today using a nail nippers were trimmed and debrided toenails 1-5 manually and mechanically in girth and width as far down to he

## 2021-11-05 ENCOUNTER — TELEPHONE (OUTPATIENT)
Dept: FAMILY MEDICINE CLINIC | Facility: CLINIC | Age: 86
End: 2021-11-05

## 2021-11-05 NOTE — TELEPHONE ENCOUNTER
I spoke with pt he states he takes Furosemide 20 mg daily. Pt states he was taking 2 tabs but it was decreased to 1 tab at last visit. Pt did schedule salvador 11/8/21. Pt states he does not need to go to ER or immediate care for evaluation.

## 2021-11-05 NOTE — TELEPHONE ENCOUNTER
I called patient because he did not show up for his appointment. Per RH I was to ask him how he was feeling. Patient stated he was still having the same problem.   He urinates every 30 minutes   Sometimes 15    RH would like us to contact the patient and

## 2021-11-05 NOTE — TELEPHONE ENCOUNTER
[id number] Sarah Nino will follow up then, make sure no signs of fluid overload, and either adjust medications or start work up for polyuria.     Puja Rivera MD

## 2021-11-08 ENCOUNTER — LAB ENCOUNTER (OUTPATIENT)
Dept: LAB | Age: 86
End: 2021-11-08
Attending: FAMILY MEDICINE
Payer: MEDICARE

## 2021-11-08 ENCOUNTER — OFFICE VISIT (OUTPATIENT)
Dept: FAMILY MEDICINE CLINIC | Facility: CLINIC | Age: 86
End: 2021-11-08
Payer: MEDICARE

## 2021-11-08 VITALS
RESPIRATION RATE: 18 BRPM | OXYGEN SATURATION: 98 % | HEART RATE: 64 BPM | DIASTOLIC BLOOD PRESSURE: 78 MMHG | HEIGHT: 66 IN | BODY MASS INDEX: 28.45 KG/M2 | WEIGHT: 177 LBS | SYSTOLIC BLOOD PRESSURE: 102 MMHG

## 2021-11-08 DIAGNOSIS — E11.51 DIABETES TYPE 2 WITH ATHEROSCLEROSIS OF ARTERIES OF EXTREMITIES (HCC): ICD-10-CM

## 2021-11-08 DIAGNOSIS — I70.209 DIABETES TYPE 2 WITH ATHEROSCLEROSIS OF ARTERIES OF EXTREMITIES (HCC): ICD-10-CM

## 2021-11-08 DIAGNOSIS — R35.0 FREQUENT URINATION: Primary | ICD-10-CM

## 2021-11-08 DIAGNOSIS — R35.0 FREQUENT URINATION: ICD-10-CM

## 2021-11-08 PROCEDURE — 99213 OFFICE O/P EST LOW 20 MIN: CPT | Performed by: FAMILY MEDICINE

## 2021-11-08 PROCEDURE — 81003 URINALYSIS AUTO W/O SCOPE: CPT

## 2021-11-08 PROCEDURE — 36415 COLL VENOUS BLD VENIPUNCTURE: CPT

## 2021-11-08 PROCEDURE — 83036 HEMOGLOBIN GLYCOSYLATED A1C: CPT

## 2021-11-08 RX ORDER — TAMSULOSIN HYDROCHLORIDE 0.4 MG/1
CAPSULE ORAL
COMMUNITY
Start: 2021-10-14

## 2021-11-08 NOTE — PATIENT INSTRUCTIONS
Stop furosemide, as looking back not needed for your heart or kidneys. Can also stop the potassium chloride. We are going to do labs to check if your urine is abnormal and the frequent urination caused by something else.  As well as check blood sugar av

## 2021-11-08 NOTE — PROGRESS NOTES
[facility] Progress Note    SUBJECTIVE: Avery Sheldon 80year old male is here today for Patient presents with:  Medication Follow-Up      Still frequent, still going often enough just leaks out.  Has been wearing a pad    Saw urology Dr. 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     Diagnoses and all orders for this visit:    Frequent urination         He has no hx of heart failure, and lasix was started due to stasis dermatitis in setting of amlodipine which

## 2021-11-09 ENCOUNTER — LAB ENCOUNTER (OUTPATIENT)
Dept: LAB | Age: 86
End: 2021-11-09
Attending: FAMILY MEDICINE
Payer: MEDICARE

## 2021-11-09 DIAGNOSIS — R35.0 FREQUENT URINATION: ICD-10-CM

## 2021-11-09 DIAGNOSIS — E11.51 DIABETES TYPE 2 WITH ATHEROSCLEROSIS OF ARTERIES OF EXTREMITIES (HCC): ICD-10-CM

## 2021-11-09 DIAGNOSIS — I70.209 DIABETES TYPE 2 WITH ATHEROSCLEROSIS OF ARTERIES OF EXTREMITIES (HCC): ICD-10-CM

## 2021-11-09 PROCEDURE — 83935 ASSAY OF URINE OSMOLALITY: CPT

## 2021-12-13 ENCOUNTER — OFFICE VISIT (OUTPATIENT)
Dept: PODIATRY CLINIC | Facility: CLINIC | Age: 86
End: 2021-12-13
Payer: MEDICARE

## 2021-12-13 DIAGNOSIS — I73.89 OTHER SPECIFIED PERIPHERAL VASCULAR DISEASES (HCC): ICD-10-CM

## 2021-12-13 DIAGNOSIS — L84 HELOMA DURUM: ICD-10-CM

## 2021-12-13 DIAGNOSIS — Z89.421 HISTORY OF PARTIAL AMPUTATION OF TOE OF RIGHT FOOT (HCC): ICD-10-CM

## 2021-12-13 DIAGNOSIS — M20.41 HAMMER TOES OF BOTH FEET: ICD-10-CM

## 2021-12-13 DIAGNOSIS — M21.6X1 PLANTAR FLEXED METATARSAL BONE OF RIGHT FOOT: ICD-10-CM

## 2021-12-13 DIAGNOSIS — E11.42 DIABETIC POLYNEUROPATHY ASSOCIATED WITH TYPE 2 DIABETES MELLITUS (HCC): Primary | ICD-10-CM

## 2021-12-13 DIAGNOSIS — L84 CALLUS OF FOOT: ICD-10-CM

## 2021-12-13 DIAGNOSIS — B35.1 ONYCHOMYCOSIS: ICD-10-CM

## 2021-12-13 DIAGNOSIS — M20.42 HAMMER TOES OF BOTH FEET: ICD-10-CM

## 2021-12-13 PROCEDURE — 11721 DEBRIDE NAIL 6 OR MORE: CPT | Performed by: PODIATRIST

## 2021-12-13 NOTE — PROGRESS NOTES
Rehan Berrios is a 80year old male. Patient presents with:  Toenail Care: Routine nail care, 80yr old male unable to cut his own nails, pt has no c/o no today.         HPI:   This pleasant gentleman returns to the clinic complaining of his toes hurting him Bronchitis, not specified as acute or chronic    • CAD (coronary artery disease) 1/17/2013   • Cancer [facility])     prostate cancer -seed implants   • Cataract    • Change in voice 2/19/2014   • Chondritis 9/16/2014   • Coronary artery disease involving native mellitus without mention of complication, not stated as uncontrolled    • Type II or unspecified type diabetes mellitus without mention of complication, uncontrolled    • Unspecified hereditary and idiopathic peripheral neuropathy    • Urethral stricture Caffeine Concern: Yes        Exercise: Yes          REVIEW OF SYSTEMS:   Today reviewed systens as documented below  GENERAL HEALTH: feels well otherwise  SKIN: Refer to exam below  RESPIRATORY: denies shortness of breath with exertion  CARDIOVASCULAR: den amputation of toe of right foot ([facility]        Plan: Today using a nail nippers were trimmed and debrided toenails 1-5 manually and mechanically in girth and width as far down to healthy tissue as possible on both feet.   This was done uneventfully there was

## 2022-01-10 RX ORDER — SIMVASTATIN 10 MG
TABLET ORAL
Qty: 90 TABLET | Refills: 1 | Status: SHIPPED | OUTPATIENT
Start: 2022-01-10

## 2022-02-04 ENCOUNTER — TELEPHONE (OUTPATIENT)
Dept: FAMILY MEDICINE CLINIC | Facility: CLINIC | Age: 87
End: 2022-02-04

## 2022-02-04 NOTE — TELEPHONE ENCOUNTER
Patient called and stated that he slipped on the floor yesterday and smacked his forehead on the 4\"section of tub . He is having a lot of forehead pain/headache. He wanted an appointment with . I walked back and spoke to Deb Dye who spoke directly to Dr. Janeth Dowell. Patient advised to go to ER for evaluation. Patient agreed. Told him  would be able to see the hospitals notes.

## 2022-02-05 NOTE — TELEPHONE ENCOUNTER
Called patient today (Saturday) to see how he was as he did not go to the ER    He explained he iced his head for about 20 minutes and felt better. He then took a nap and feels good today.

## 2022-02-07 ENCOUNTER — TELEPHONE (OUTPATIENT)
Dept: FAMILY MEDICINE CLINIC | Facility: CLINIC | Age: 87
End: 2022-02-07

## 2022-02-07 RX ORDER — BLOOD SUGAR DIAGNOSTIC
STRIP MISCELLANEOUS
Qty: 100 STRIP | Refills: 1 | Status: CANCELLED | OUTPATIENT
Start: 2022-02-07 | End: 2023-02-07

## 2022-02-07 RX ORDER — LANCETS
EACH MISCELLANEOUS
Qty: 50 EACH | Refills: 3 | Status: SHIPPED | OUTPATIENT
Start: 2022-02-07

## 2022-02-07 RX ORDER — BLOOD SUGAR DIAGNOSTIC
STRIP MISCELLANEOUS
Qty: 50 EACH | Refills: 3 | Status: SHIPPED | OUTPATIENT
Start: 2022-02-07 | End: 2022-02-09

## 2022-02-07 NOTE — TELEPHONE ENCOUNTER
Needs dx and needs to state wether pt uses insulin or not.   Please resend test strips and lancets rx with correct info

## 2022-02-09 ENCOUNTER — TELEPHONE (OUTPATIENT)
Dept: FAMILY MEDICINE CLINIC | Facility: CLINIC | Age: 87
End: 2022-02-09

## 2022-02-09 RX ORDER — BLOOD SUGAR DIAGNOSTIC
STRIP MISCELLANEOUS
Qty: 200 STRIP | Refills: 3 | Status: SHIPPED | OUTPATIENT
Start: 2022-02-09 | End: 2022-02-09

## 2022-02-09 RX ORDER — BLOOD SUGAR DIAGNOSTIC
STRIP MISCELLANEOUS
Qty: 200 STRIP | Refills: 0 | Status: SHIPPED | OUTPATIENT
Start: 2022-02-09 | End: 2022-02-09

## 2022-02-09 NOTE — TELEPHONE ENCOUNTER
Mariah Lora from [employer] and needs rx sent in for lancets   She also needs the insulin usage for the one touch strips    Please call with any questions

## 2022-02-22 ENCOUNTER — OFFICE VISIT (OUTPATIENT)
Dept: PODIATRY CLINIC | Facility: CLINIC | Age: 87
End: 2022-02-22
Payer: MEDICARE

## 2022-02-22 DIAGNOSIS — M20.42 HAMMER TOES OF BOTH FEET: ICD-10-CM

## 2022-02-22 DIAGNOSIS — L84 CALLUS OF FOOT: ICD-10-CM

## 2022-02-22 DIAGNOSIS — B35.1 ONYCHOMYCOSIS: ICD-10-CM

## 2022-02-22 DIAGNOSIS — M21.6X1 PLANTAR FLEXED METATARSAL BONE OF RIGHT FOOT: ICD-10-CM

## 2022-02-22 DIAGNOSIS — L84 HELOMA DURUM: ICD-10-CM

## 2022-02-22 DIAGNOSIS — Z89.421 HISTORY OF PARTIAL AMPUTATION OF TOE OF RIGHT FOOT (HCC): ICD-10-CM

## 2022-02-22 DIAGNOSIS — M20.41 HAMMER TOES OF BOTH FEET: ICD-10-CM

## 2022-02-22 DIAGNOSIS — I73.89 OTHER SPECIFIED PERIPHERAL VASCULAR DISEASES (HCC): ICD-10-CM

## 2022-02-22 DIAGNOSIS — E11.42 DIABETIC POLYNEUROPATHY ASSOCIATED WITH TYPE 2 DIABETES MELLITUS (HCC): Primary | ICD-10-CM

## 2022-02-22 DIAGNOSIS — M21.6X2 PLANTAR FLEXED METATARSAL BONE OF LEFT FOOT: ICD-10-CM

## 2022-02-22 PROCEDURE — 11721 DEBRIDE NAIL 6 OR MORE: CPT | Performed by: PODIATRIST

## 2022-04-22 RX ORDER — GLIPIZIDE 2.5 MG/1
TABLET, EXTENDED RELEASE ORAL
Qty: 180 TABLET | Refills: 0 | Status: SHIPPED | OUTPATIENT
Start: 2022-04-22

## 2022-04-25 ENCOUNTER — OFFICE VISIT (OUTPATIENT)
Dept: PODIATRY CLINIC | Facility: CLINIC | Age: 87
End: 2022-04-25
Payer: MEDICARE

## 2022-04-25 DIAGNOSIS — M21.6X1 PLANTAR FLEXED METATARSAL BONE OF RIGHT FOOT: ICD-10-CM

## 2022-04-25 DIAGNOSIS — M20.42 HAMMER TOES OF BOTH FEET: ICD-10-CM

## 2022-04-25 DIAGNOSIS — E11.42 DIABETIC POLYNEUROPATHY ASSOCIATED WITH TYPE 2 DIABETES MELLITUS (HCC): ICD-10-CM

## 2022-04-25 DIAGNOSIS — I73.89 OTHER SPECIFIED PERIPHERAL VASCULAR DISEASES (HCC): ICD-10-CM

## 2022-04-25 DIAGNOSIS — M20.41 HAMMER TOES OF BOTH FEET: ICD-10-CM

## 2022-04-25 DIAGNOSIS — B35.1 ONYCHOMYCOSIS: ICD-10-CM

## 2022-04-25 DIAGNOSIS — E08.41 DIABETIC MONONEUROPATHY ASSOCIATED WITH DIABETES MELLITUS DUE TO UNDERLYING CONDITION (HCC): ICD-10-CM

## 2022-04-25 DIAGNOSIS — E11.51 DIABETES TYPE 2 WITH ATHEROSCLEROSIS OF ARTERIES OF EXTREMITIES (HCC): ICD-10-CM

## 2022-04-25 DIAGNOSIS — I70.209 DIABETES TYPE 2 WITH ATHEROSCLEROSIS OF ARTERIES OF EXTREMITIES (HCC): ICD-10-CM

## 2022-04-25 DIAGNOSIS — M21.6X2 PLANTAR FLEXED METATARSAL BONE OF LEFT FOOT: ICD-10-CM

## 2022-04-25 DIAGNOSIS — L84 HELOMA DURUM: ICD-10-CM

## 2022-04-25 DIAGNOSIS — L84 CALLUS OF FOOT: ICD-10-CM

## 2022-06-27 ENCOUNTER — OFFICE VISIT (OUTPATIENT)
Dept: PODIATRY CLINIC | Facility: CLINIC | Age: 87
End: 2022-06-27
Payer: MEDICARE

## 2022-06-27 DIAGNOSIS — L84 HELOMA DURUM: ICD-10-CM

## 2022-06-27 DIAGNOSIS — M21.6X2 PLANTAR FLEXED METATARSAL BONE OF LEFT FOOT: ICD-10-CM

## 2022-06-27 DIAGNOSIS — E11.42 DIABETIC POLYNEUROPATHY ASSOCIATED WITH TYPE 2 DIABETES MELLITUS (HCC): ICD-10-CM

## 2022-06-27 DIAGNOSIS — M21.6X1 PLANTAR FLEXED METATARSAL BONE OF RIGHT FOOT: ICD-10-CM

## 2022-06-27 DIAGNOSIS — M20.41 HAMMER TOES OF BOTH FEET: ICD-10-CM

## 2022-06-27 DIAGNOSIS — I73.89 OTHER SPECIFIED PERIPHERAL VASCULAR DISEASES (HCC): ICD-10-CM

## 2022-06-27 DIAGNOSIS — B35.1 ONYCHOMYCOSIS: ICD-10-CM

## 2022-06-27 DIAGNOSIS — I70.209 DIABETES TYPE 2 WITH ATHEROSCLEROSIS OF ARTERIES OF EXTREMITIES (HCC): Primary | ICD-10-CM

## 2022-06-27 DIAGNOSIS — M20.42 HAMMER TOES OF BOTH FEET: ICD-10-CM

## 2022-06-27 DIAGNOSIS — E11.51 DIABETES TYPE 2 WITH ATHEROSCLEROSIS OF ARTERIES OF EXTREMITIES (HCC): Primary | ICD-10-CM

## 2022-06-27 DIAGNOSIS — L84 CALLUS OF FOOT: ICD-10-CM

## 2022-07-08 ENCOUNTER — TELEPHONE (OUTPATIENT)
Dept: FAMILY MEDICINE CLINIC | Facility: CLINIC | Age: 87
End: 2022-07-08

## 2022-07-08 RX ORDER — SIMVASTATIN 10 MG
TABLET ORAL
Qty: 90 TABLET | Refills: 0 | Status: SHIPPED | OUTPATIENT
Start: 2022-07-08

## 2022-07-08 RX ORDER — GLIPIZIDE 2.5 MG/1
TABLET, EXTENDED RELEASE ORAL
Qty: 360 TABLET | Refills: 0 | Status: SHIPPED | OUTPATIENT
Start: 2022-07-08

## 2022-07-08 NOTE — TELEPHONE ENCOUNTER
PT NEED REFILL ON SIMVISTASTIN 10 MG AND GLIPIZIDE ER 2.5 MG      HE IS OUT AND NEEDS THEM TODAY.     [facility] ON FILE

## 2022-08-22 ENCOUNTER — OFFICE VISIT (OUTPATIENT)
Dept: FAMILY MEDICINE CLINIC | Facility: CLINIC | Age: 87
End: 2022-08-22
Payer: MEDICARE

## 2022-08-22 ENCOUNTER — LAB ENCOUNTER (OUTPATIENT)
Dept: LAB | Age: 87
End: 2022-08-22
Attending: FAMILY MEDICINE
Payer: MEDICARE

## 2022-08-22 VITALS
DIASTOLIC BLOOD PRESSURE: 74 MMHG | RESPIRATION RATE: 16 BRPM | WEIGHT: 166 LBS | HEART RATE: 64 BPM | SYSTOLIC BLOOD PRESSURE: 128 MMHG | BODY MASS INDEX: 26.68 KG/M2 | OXYGEN SATURATION: 98 % | HEIGHT: 66 IN

## 2022-08-22 DIAGNOSIS — Z13.0 SCREENING FOR DEFICIENCY ANEMIA: ICD-10-CM

## 2022-08-22 DIAGNOSIS — I70.209 DIABETES TYPE 2 WITH ATHEROSCLEROSIS OF ARTERIES OF EXTREMITIES (HCC): ICD-10-CM

## 2022-08-22 DIAGNOSIS — I70.209 DIABETES TYPE 2 WITH ATHEROSCLEROSIS OF ARTERIES OF EXTREMITIES (HCC): Primary | ICD-10-CM

## 2022-08-22 DIAGNOSIS — I10 ESSENTIAL HYPERTENSION: ICD-10-CM

## 2022-08-22 DIAGNOSIS — E11.9 TYPE 2 DIABETES MELLITUS WITHOUT COMPLICATION, WITHOUT LONG-TERM CURRENT USE OF INSULIN (HCC): ICD-10-CM

## 2022-08-22 DIAGNOSIS — I25.10 CAD IN NATIVE ARTERY: ICD-10-CM

## 2022-08-22 DIAGNOSIS — E11.51 DIABETES TYPE 2 WITH ATHEROSCLEROSIS OF ARTERIES OF EXTREMITIES (HCC): Primary | ICD-10-CM

## 2022-08-22 DIAGNOSIS — E11.51 DIABETES TYPE 2 WITH ATHEROSCLEROSIS OF ARTERIES OF EXTREMITIES (HCC): ICD-10-CM

## 2022-08-22 LAB
ALBUMIN SERPL-MCNC: 3.9 G/DL (ref 3.4–5)
ALBUMIN/GLOB SERPL: 1.1 {RATIO} (ref 1–2)
ALP LIVER SERPL-CCNC: 102 U/L
ALT SERPL-CCNC: 32 U/L
ANION GAP SERPL CALC-SCNC: 7 MMOL/L (ref 0–18)
AST SERPL-CCNC: 17 U/L (ref 15–37)
BASOPHILS # BLD AUTO: 0.04 X10(3) UL (ref 0–0.2)
BASOPHILS NFR BLD AUTO: 0.4 %
BILIRUB SERPL-MCNC: 1 MG/DL (ref 0.1–2)
BUN BLD-MCNC: 21 MG/DL (ref 7–18)
CALCIUM BLD-MCNC: 9.4 MG/DL (ref 8.5–10.1)
CHLORIDE SERPL-SCNC: 110 MMOL/L (ref 98–112)
CHOLEST SERPL-MCNC: 213 MG/DL (ref ?–200)
CO2 SERPL-SCNC: 22 MMOL/L (ref 21–32)
CREAT BLD-MCNC: 0.97 MG/DL
CREAT UR-SCNC: 144 MG/DL
EOSINOPHIL # BLD AUTO: 0.12 X10(3) UL (ref 0–0.7)
EOSINOPHIL NFR BLD AUTO: 1.2 %
ERYTHROCYTE [DISTWIDTH] IN BLOOD BY AUTOMATED COUNT: 12.7 %
EST. AVERAGE GLUCOSE BLD GHB EST-MCNC: 117 MG/DL (ref 68–126)
FASTING PATIENT LIPID ANSWER: NO
FASTING STATUS PATIENT QL REPORTED: NO
GFR SERPLBLD BASED ON 1.73 SQ M-ARVRAT: 72 ML/MIN/1.73M2 (ref 60–?)
GLOBULIN PLAS-MCNC: 3.6 G/DL (ref 2.8–4.4)
GLUCOSE BLD-MCNC: 116 MG/DL (ref 70–99)
HBA1C MFR BLD: 5.7 % (ref ?–5.7)
HCT VFR BLD AUTO: 48.7 %
HDLC SERPL-MCNC: 50 MG/DL (ref 40–59)
HGB BLD-MCNC: 16.2 G/DL
IMM GRANULOCYTES # BLD AUTO: 0.03 X10(3) UL (ref 0–1)
IMM GRANULOCYTES NFR BLD: 0.3 %
LDLC SERPL CALC-MCNC: 125 MG/DL (ref ?–100)
LYMPHOCYTES # BLD AUTO: 1.85 X10(3) UL (ref 1–4)
LYMPHOCYTES NFR BLD AUTO: 18.6 %
MCH RBC QN AUTO: 30.2 PG (ref 26–34)
MCHC RBC AUTO-ENTMCNC: 33.3 G/DL (ref 31–37)
MCV RBC AUTO: 90.7 FL
MICROALBUMIN UR-MCNC: 2.68 MG/DL
MICROALBUMIN/CREAT 24H UR-RTO: 18.6 UG/MG (ref ?–30)
MONOCYTES # BLD AUTO: 0.96 X10(3) UL (ref 0.1–1)
MONOCYTES NFR BLD AUTO: 9.7 %
NEUTROPHILS # BLD AUTO: 6.92 X10 (3) UL (ref 1.5–7.7)
NEUTROPHILS # BLD AUTO: 6.92 X10(3) UL (ref 1.5–7.7)
NEUTROPHILS NFR BLD AUTO: 69.8 %
NONHDLC SERPL-MCNC: 163 MG/DL (ref ?–130)
OSMOLALITY SERPL CALC.SUM OF ELEC: 292 MOSM/KG (ref 275–295)
PLATELET # BLD AUTO: 243 10(3)UL (ref 150–450)
POTASSIUM SERPL-SCNC: 4.1 MMOL/L (ref 3.5–5.1)
PROT SERPL-MCNC: 7.5 G/DL (ref 6.4–8.2)
RBC # BLD AUTO: 5.37 X10(6)UL
SODIUM SERPL-SCNC: 139 MMOL/L (ref 136–145)
TRIGL SERPL-MCNC: 213 MG/DL (ref 30–149)
VLDLC SERPL CALC-MCNC: 38 MG/DL (ref 0–30)
WBC # BLD AUTO: 9.9 X10(3) UL (ref 4–11)

## 2022-08-22 PROCEDURE — 80061 LIPID PANEL: CPT

## 2022-08-22 PROCEDURE — 99213 OFFICE O/P EST LOW 20 MIN: CPT | Performed by: FAMILY MEDICINE

## 2022-08-22 PROCEDURE — 83036 HEMOGLOBIN GLYCOSYLATED A1C: CPT

## 2022-08-22 PROCEDURE — 36415 COLL VENOUS BLD VENIPUNCTURE: CPT

## 2022-08-22 PROCEDURE — 82570 ASSAY OF URINE CREATININE: CPT

## 2022-08-22 PROCEDURE — 85025 COMPLETE CBC W/AUTO DIFF WBC: CPT

## 2022-08-22 PROCEDURE — 82043 UR ALBUMIN QUANTITATIVE: CPT

## 2022-08-22 PROCEDURE — 80053 COMPREHEN METABOLIC PANEL: CPT

## 2022-08-22 RX ORDER — LOSARTAN POTASSIUM 100 MG/1
100 TABLET ORAL
Qty: 90 TABLET | Refills: 3 | Status: SHIPPED | OUTPATIENT
Start: 2022-08-22

## 2022-09-06 ENCOUNTER — OFFICE VISIT (OUTPATIENT)
Dept: PODIATRY CLINIC | Facility: CLINIC | Age: 87
End: 2022-09-06
Payer: MEDICARE

## 2022-09-06 DIAGNOSIS — M20.41 HAMMER TOES OF BOTH FEET: ICD-10-CM

## 2022-09-06 DIAGNOSIS — E11.51 DIABETES TYPE 2 WITH ATHEROSCLEROSIS OF ARTERIES OF EXTREMITIES (HCC): ICD-10-CM

## 2022-09-06 DIAGNOSIS — E11.42 DIABETIC POLYNEUROPATHY ASSOCIATED WITH TYPE 2 DIABETES MELLITUS (HCC): ICD-10-CM

## 2022-09-06 DIAGNOSIS — M20.42 HAMMER TOES OF BOTH FEET: ICD-10-CM

## 2022-09-06 DIAGNOSIS — L84 CALLUS OF FOOT: ICD-10-CM

## 2022-09-06 DIAGNOSIS — M21.6X1 PLANTAR FLEXED METATARSAL BONE OF RIGHT FOOT: ICD-10-CM

## 2022-09-06 DIAGNOSIS — I73.89 OTHER SPECIFIED PERIPHERAL VASCULAR DISEASES (HCC): ICD-10-CM

## 2022-09-06 DIAGNOSIS — L84 HELOMA DURUM: ICD-10-CM

## 2022-09-06 DIAGNOSIS — L97.501 SKIN ULCER OF TOE, LIMITED TO BREAKDOWN OF SKIN, UNSPECIFIED LATERALITY (HCC): Primary | ICD-10-CM

## 2022-09-06 DIAGNOSIS — B35.1 ONYCHOMYCOSIS: ICD-10-CM

## 2022-09-06 DIAGNOSIS — M21.6X2 PLANTAR FLEXED METATARSAL BONE OF LEFT FOOT: ICD-10-CM

## 2022-09-06 DIAGNOSIS — I70.209 DIABETES TYPE 2 WITH ATHEROSCLEROSIS OF ARTERIES OF EXTREMITIES (HCC): ICD-10-CM

## 2022-09-06 PROCEDURE — L3260 AMBULATORY SURGICAL BOOT EAC: HCPCS | Performed by: PODIATRIST

## 2022-09-06 PROCEDURE — 99213 OFFICE O/P EST LOW 20 MIN: CPT | Performed by: PODIATRIST

## 2022-09-06 RX ORDER — SULFAMETHOXAZOLE AND TRIMETHOPRIM 800; 160 MG/1; MG/1
1 TABLET ORAL 2 TIMES DAILY
Qty: 14 TABLET | Refills: 1 | Status: SHIPPED | OUTPATIENT
Start: 2022-09-06

## 2022-09-15 ENCOUNTER — OFFICE VISIT (OUTPATIENT)
Dept: PODIATRY CLINIC | Facility: CLINIC | Age: 87
End: 2022-09-15
Payer: MEDICARE

## 2022-09-15 DIAGNOSIS — I73.89 OTHER SPECIFIED PERIPHERAL VASCULAR DISEASES (HCC): ICD-10-CM

## 2022-09-15 DIAGNOSIS — L97.501 SKIN ULCER OF TOE, LIMITED TO BREAKDOWN OF SKIN, UNSPECIFIED LATERALITY (HCC): ICD-10-CM

## 2022-09-15 DIAGNOSIS — E11.42 DIABETIC POLYNEUROPATHY ASSOCIATED WITH TYPE 2 DIABETES MELLITUS (HCC): Primary | ICD-10-CM

## 2022-09-15 PROCEDURE — 1126F AMNT PAIN NOTED NONE PRSNT: CPT | Performed by: PODIATRIST

## 2022-09-15 PROCEDURE — 99213 OFFICE O/P EST LOW 20 MIN: CPT | Performed by: PODIATRIST

## 2022-09-20 ENCOUNTER — OFFICE VISIT (OUTPATIENT)
Dept: PODIATRY CLINIC | Facility: CLINIC | Age: 87
End: 2022-09-20

## 2022-09-20 DIAGNOSIS — L97.501 SKIN ULCER OF TOE, LIMITED TO BREAKDOWN OF SKIN, UNSPECIFIED LATERALITY (HCC): ICD-10-CM

## 2022-09-20 DIAGNOSIS — E11.42 DIABETIC POLYNEUROPATHY ASSOCIATED WITH TYPE 2 DIABETES MELLITUS (HCC): Primary | ICD-10-CM

## 2022-09-20 DIAGNOSIS — I73.89 OTHER SPECIFIED PERIPHERAL VASCULAR DISEASES (HCC): ICD-10-CM

## 2022-09-20 PROCEDURE — 1126F AMNT PAIN NOTED NONE PRSNT: CPT | Performed by: PODIATRIST

## 2022-09-20 PROCEDURE — 99213 OFFICE O/P EST LOW 20 MIN: CPT | Performed by: PODIATRIST

## 2022-09-21 ENCOUNTER — TELEPHONE (OUTPATIENT)
Dept: PODIATRY CLINIC | Facility: CLINIC | Age: 87
End: 2022-09-21

## 2022-09-21 NOTE — TELEPHONE ENCOUNTER
Disregard the appointment message as patient does have one scheduled before ultra sound. Patients daughter called form out of town and trying to find out if patient should still be using a cream for toe and also if another appointment is to be made before ultra sound in mid October. Please reach out to her and you can leave a voice message for her.

## 2022-09-21 NOTE — TELEPHONE ENCOUNTER
Daughter Sandeep Mora called.  Message left to call us to make an appointment for 1 week after ultrasound and to continue the mupirocin while the ulcer is open

## 2022-09-21 NOTE — TELEPHONE ENCOUNTER
Patients daughter wondering if patient should continue with mupirocin 2% external ointment if so for how long.

## 2022-09-28 ENCOUNTER — TELEPHONE (OUTPATIENT)
Dept: FAMILY MEDICINE CLINIC | Facility: CLINIC | Age: 87
End: 2022-09-28

## 2022-09-28 NOTE — TELEPHONE ENCOUNTER
Patient's daughter is going through patient's medications and is checking on dosing strength or hypertension, diabetes and statin medications. Reviewed medication profile with her and she verbalizes understanding.

## 2022-10-04 ENCOUNTER — TELEPHONE (OUTPATIENT)
Dept: FAMILY MEDICINE CLINIC | Facility: CLINIC | Age: 87
End: 2022-10-04

## 2022-10-04 ENCOUNTER — HOSPITAL ENCOUNTER (OUTPATIENT)
Dept: GENERAL RADIOLOGY | Age: 87
Discharge: HOME | End: 2022-10-04
Attending: FAMILY MEDICINE
Payer: MEDICARE

## 2022-10-04 ENCOUNTER — OFFICE VISIT (OUTPATIENT)
Dept: FAMILY MEDICINE CLINIC | Facility: CLINIC | Age: 87
End: 2022-10-04
Payer: MEDICARE

## 2022-10-04 VITALS
HEIGHT: 66 IN | OXYGEN SATURATION: 94 % | RESPIRATION RATE: 22 BRPM | WEIGHT: 164.81 LBS | SYSTOLIC BLOOD PRESSURE: 128 MMHG | BODY MASS INDEX: 26.49 KG/M2 | DIASTOLIC BLOOD PRESSURE: 68 MMHG | HEART RATE: 86 BPM

## 2022-10-04 DIAGNOSIS — Z11.1 SCREENING FOR TUBERCULOSIS: ICD-10-CM

## 2022-10-04 DIAGNOSIS — S69.91XA INJURY OF RIGHT WRIST, INITIAL ENCOUNTER: Primary | ICD-10-CM

## 2022-10-04 DIAGNOSIS — Z02.2 ENCOUNTER FOR EXAMINATION FOR ADMISSION TO NURSING HOME: ICD-10-CM

## 2022-10-04 DIAGNOSIS — S69.91XA INJURY OF RIGHT WRIST, INITIAL ENCOUNTER: ICD-10-CM

## 2022-10-04 PROCEDURE — 73110 X-RAY EXAM OF WRIST: CPT | Performed by: FAMILY MEDICINE

## 2022-10-04 PROCEDURE — G0008 ADMIN INFLUENZA VIRUS VAC: HCPCS | Performed by: FAMILY MEDICINE

## 2022-10-04 PROCEDURE — 90662 IIV NO PRSV INCREASED AG IM: CPT | Performed by: FAMILY MEDICINE

## 2022-10-04 PROCEDURE — 99215 OFFICE O/P EST HI 40 MIN: CPT | Performed by: FAMILY MEDICINE

## 2022-10-04 RX ORDER — NAPROXEN 500 MG/1
500 TABLET ORAL 2 TIMES DAILY WITH MEALS
Qty: 14 TABLET | Refills: 0 | Status: SHIPPED | OUTPATIENT
Start: 2022-10-04 | End: 2022-10-04

## 2022-10-04 RX ORDER — NAPROXEN 500 MG/1
500 TABLET ORAL 2 TIMES DAILY WITH MEALS
Qty: 14 TABLET | Refills: 0 | Status: SHIPPED | OUTPATIENT
Start: 2022-10-04

## 2022-10-04 NOTE — TELEPHONE ENCOUNTER
I called Georgie Messer back and let him know of the likely fracture and gave information for orthopedics.     Nathanael Bonilla MD

## 2022-10-04 NOTE — TELEPHONE ENCOUNTER
Please contact son Sarah Brunilda with father's xray results when they come in. He was the one who accompanied Janie Edgar today to his appointment.

## 2022-10-05 ENCOUNTER — TELEPHONE (OUTPATIENT)
Dept: ORTHOPEDICS CLINIC | Facility: CLINIC | Age: 87
End: 2022-10-05

## 2022-10-05 ENCOUNTER — OFFICE VISIT (OUTPATIENT)
Dept: ORTHOPEDICS CLINIC | Facility: CLINIC | Age: 87
End: 2022-10-05
Payer: MEDICARE

## 2022-10-05 VITALS — HEIGHT: 66 IN | BODY MASS INDEX: 26.36 KG/M2 | WEIGHT: 164 LBS

## 2022-10-05 DIAGNOSIS — M19.041 PRIMARY OSTEOARTHRITIS OF RIGHT HAND: ICD-10-CM

## 2022-10-05 DIAGNOSIS — S62.114A CLOSED NONDISPLACED FRACTURE OF TRIQUETRUM OF RIGHT WRIST, INITIAL ENCOUNTER: Primary | ICD-10-CM

## 2022-10-05 DIAGNOSIS — S69.91XA INJURY OF RIGHT SCAPHOLUNATE LIGAMENT WITH NO INSTABILITY, INITIAL ENCOUNTER: ICD-10-CM

## 2022-10-05 PROCEDURE — 99204 OFFICE O/P NEW MOD 45 MIN: CPT | Performed by: PHYSICIAN ASSISTANT

## 2022-10-05 PROCEDURE — 1125F AMNT PAIN NOTED PAIN PRSNT: CPT | Performed by: PHYSICIAN ASSISTANT

## 2022-10-05 NOTE — TELEPHONE ENCOUNTER
Np Right Wrist Fracture/Imaging in Epic.  Please advise if additional imaging is needed,  Future Appointments   Date Time Provider Alexsander Harrell   10/5/2022  1:40 PM OLIVER [facility] [id number]   10/11/2022  3:00 PM Syed Romano [id number] [facility]   10/18/2022  2:00 PM [facility] RM 2 [facility]

## 2022-10-11 ENCOUNTER — OFFICE VISIT (OUTPATIENT)
Dept: PODIATRY CLINIC | Facility: CLINIC | Age: 87
End: 2022-10-11
Payer: MEDICARE

## 2022-10-11 ENCOUNTER — TELEPHONE (OUTPATIENT)
Dept: FAMILY MEDICINE CLINIC | Facility: CLINIC | Age: 87
End: 2022-10-11

## 2022-10-11 ENCOUNTER — TELEPHONE (OUTPATIENT)
Dept: ORTHOPEDICS CLINIC | Facility: CLINIC | Age: 87
End: 2022-10-11

## 2022-10-11 DIAGNOSIS — L97.501 SKIN ULCER OF TOE, LIMITED TO BREAKDOWN OF SKIN, UNSPECIFIED LATERALITY (HCC): ICD-10-CM

## 2022-10-11 DIAGNOSIS — E11.42 DIABETIC POLYNEUROPATHY ASSOCIATED WITH TYPE 2 DIABETES MELLITUS (HCC): Primary | ICD-10-CM

## 2022-10-11 DIAGNOSIS — I73.89 OTHER SPECIFIED PERIPHERAL VASCULAR DISEASES (HCC): ICD-10-CM

## 2022-10-11 PROCEDURE — 99213 OFFICE O/P EST LOW 20 MIN: CPT | Performed by: PODIATRIST

## 2022-10-11 NOTE — TELEPHONE ENCOUNTER
Received:  10/7/22    Sent to scan: 10/11/222    [address] at [place]  Fax [phone]  Ph   [phone]    Past year medical records and H&P

## 2022-10-11 NOTE — TELEPHONE ENCOUNTER
[facility] called requesting an order to add Physical Therapy and a Medical Social Worker. Delma Montano can be reached at [phone]. Please advise, thank you.

## 2022-10-18 ENCOUNTER — HOSPITAL ENCOUNTER (OUTPATIENT)
Dept: ULTRASOUND IMAGING | Facility: HOSPITAL | Age: 87
Discharge: HOME | End: 2022-10-18
Attending: PODIATRIST
Payer: MEDICARE

## 2022-10-18 ENCOUNTER — TELEPHONE (OUTPATIENT)
Dept: ORTHOPEDICS CLINIC | Facility: CLINIC | Age: 87
End: 2022-10-18

## 2022-10-18 DIAGNOSIS — E11.51 DIABETES TYPE 2 WITH ATHEROSCLEROSIS OF ARTERIES OF EXTREMITIES (HCC): ICD-10-CM

## 2022-10-18 DIAGNOSIS — I73.89 OTHER SPECIFIED PERIPHERAL VASCULAR DISEASES (HCC): ICD-10-CM

## 2022-10-18 DIAGNOSIS — I70.209 DIABETES TYPE 2 WITH ATHEROSCLEROSIS OF ARTERIES OF EXTREMITIES (HCC): ICD-10-CM

## 2022-10-18 DIAGNOSIS — L97.501 SKIN ULCER OF TOE, LIMITED TO BREAKDOWN OF SKIN, UNSPECIFIED LATERALITY (HCC): ICD-10-CM

## 2022-10-18 DIAGNOSIS — E11.42 DIABETIC POLYNEUROPATHY ASSOCIATED WITH TYPE 2 DIABETES MELLITUS (HCC): ICD-10-CM

## 2022-10-18 PROCEDURE — 93926 LOWER EXTREMITY STUDY: CPT | Performed by: PODIATRIST

## 2022-10-18 NOTE — TELEPHONE ENCOUNTER
Charo Guo called to let us know the Plan of care. They will do PT x 1 a week for 4 weeks starting today. Balance, Gaiting Training with Walker, transfer, safety, leg strengthening will be the focus. Also, re: the brace:  His brace was putting pressure on thumb, so she shaved the brace where the thumb opening was to present a pressure wound. She took a 1/2\" long piece off. Please call Charo Guo with any concerns.     Future Appointments   Date Time Provider Alexsander Harrell   10/25/2022  1:20 PM OLIVER Grcaia [facility] Roxanne Socks [id number]

## 2022-10-21 ENCOUNTER — TELEPHONE (OUTPATIENT)
Dept: PODIATRY CLINIC | Facility: CLINIC | Age: 87
End: 2022-10-21

## 2022-10-21 NOTE — TELEPHONE ENCOUNTER
Reviewed the results with both the son and daughter. I recommended that he get into the wound care center he still has adequate circulation to heal but he does have stenosis which is the reason we cannot really feel his pulse as well. They understood.

## 2022-10-21 NOTE — TELEPHONE ENCOUNTER
I called daughter and she states she spoke to Dr. Freddy Lowe today. She also spoke to the wound center  For an appointment. They are scheduling 2-3 weeks out. She was put on a waiting list.  Pt. Lives alone and daughter visist and home care visits. Dr. Freddy Lowe did not change treatment for the time being. Mupirrocin will continue to be used with a bandaid.

## 2022-10-21 NOTE — TELEPHONE ENCOUNTER
Daughter, Megan Mckeon called again and asking additional questions on salve for wound not working and needs stronger meds which may be prescribed at [facility]. Also asking about bandages.  Not in office and seeing if a nurse could reach out to her.

## 2022-10-25 ENCOUNTER — OFFICE VISIT (OUTPATIENT)
Dept: ORTHOPEDICS CLINIC | Facility: CLINIC | Age: 87
End: 2022-10-25
Payer: MEDICARE

## 2022-10-25 ENCOUNTER — HOSPITAL ENCOUNTER (OUTPATIENT)
Dept: GENERAL RADIOLOGY | Age: 87
Discharge: HOME | End: 2022-10-25
Attending: PHYSICIAN ASSISTANT
Payer: MEDICARE

## 2022-10-25 DIAGNOSIS — S69.91XD INJURY OF RIGHT SCAPHOLUNATE LIGAMENT WITH NO INSTABILITY, SUBSEQUENT ENCOUNTER: ICD-10-CM

## 2022-10-25 DIAGNOSIS — S62.114D CLOSED NONDISPLACED FRACTURE OF TRIQUETRUM OF RIGHT WRIST WITH ROUTINE HEALING, SUBSEQUENT ENCOUNTER: Primary | ICD-10-CM

## 2022-10-25 DIAGNOSIS — M19.041 PRIMARY OSTEOARTHRITIS OF RIGHT HAND: ICD-10-CM

## 2022-10-25 DIAGNOSIS — S62.114A CLOSED NONDISPLACED FRACTURE OF TRIQUETRUM OF RIGHT WRIST, INITIAL ENCOUNTER: ICD-10-CM

## 2022-10-25 PROCEDURE — 1126F AMNT PAIN NOTED NONE PRSNT: CPT | Performed by: PHYSICIAN ASSISTANT

## 2022-10-25 PROCEDURE — 99213 OFFICE O/P EST LOW 20 MIN: CPT | Performed by: PHYSICIAN ASSISTANT

## 2022-10-25 PROCEDURE — 73110 X-RAY EXAM OF WRIST: CPT | Performed by: PHYSICIAN ASSISTANT

## 2022-10-26 ENCOUNTER — TELEPHONE (OUTPATIENT)
Dept: PODIATRY CLINIC | Facility: CLINIC | Age: 87
End: 2022-10-26

## 2022-10-26 NOTE — TELEPHONE ENCOUNTER
S/w pt daughter(HIPPA on file) and she states [facility] told them to see wound clinic for wound so pt does not need 11/1 appt, appt cx also she wanted to know when pt was due to get nails trimmed, last one done on 9/6.  New appt made on 11/5 for nail trim

## 2022-10-28 ENCOUNTER — OFFICE VISIT (OUTPATIENT)
Dept: WOUND CARE | Facility: HOSPITAL | Age: 87
End: 2022-10-28
Attending: NURSE PRACTITIONER
Payer: MEDICARE

## 2022-10-28 VITALS
WEIGHT: 170 LBS | DIASTOLIC BLOOD PRESSURE: 67 MMHG | BODY MASS INDEX: 25.76 KG/M2 | HEIGHT: 68 IN | TEMPERATURE: 98 F | HEART RATE: 88 BPM | RESPIRATION RATE: 18 BRPM | SYSTOLIC BLOOD PRESSURE: 101 MMHG

## 2022-10-28 DIAGNOSIS — E11.621 DIABETIC ULCER OF OTHER PART OF RIGHT FOOT ASSOCIATED WITH TYPE 2 DIABETES MELLITUS, WITH FAT LAYER EXPOSED (HCC): ICD-10-CM

## 2022-10-28 DIAGNOSIS — L97.512 NON-PRESSURE CHRONIC ULCER OF OTHER PART OF RIGHT FOOT WITH FAT LAYER EXPOSED (HCC): Primary | ICD-10-CM

## 2022-10-28 DIAGNOSIS — L97.512 DIABETIC ULCER OF OTHER PART OF RIGHT FOOT ASSOCIATED WITH TYPE 2 DIABETES MELLITUS, WITH FAT LAYER EXPOSED (HCC): ICD-10-CM

## 2022-10-28 LAB — GLUCOSE BLD-MCNC: 109 MG/DL (ref 70–99)

## 2022-10-28 PROCEDURE — 11042 DBRDMT SUBQ TIS 1ST 20SQCM/<: CPT | Performed by: NURSE PRACTITIONER

## 2022-10-28 NOTE — PROGRESS NOTES
.Weekly Wound Education Note    Teaching Provided To: Patient; Family  Training Topics: Cleasing and general instructions; Discharge instructions;Dressing;Off-loading  Training Method: Explain/Verbal;Written  Training Response: Patient responds and understands; Reinforcement needed           New felted foam offloading piece made for toe. Wound covered with hydrofera transfer. Patient to keep dressing dry and leave in place until next visit. Patients son will purchase an offloading peg insert from [employer] and bring to next appointment with patients shoe.

## 2022-10-28 NOTE — PROGRESS NOTES
Patient ID: Vladimir Tadeo is a 80year old male.     Debridement   Wound 10/28/22 #1 right plantar great toe Diabetic Ulcer Toe (Comment which one) Right;Plantar    Consent obtained? verbal  Consent given by: patient    Performed by: provider  Debridement type: surgical  Level of debridement: subcutaneous tissue    Pre-debridement measurements  Length (cm): 0.3  Width (cm): 0.7  Depth (cm): 0.3  Surface Area (cm^2): 0.21  Volume (cm^3): 0.06    Post-debridement measurements  Length (cm): 0.4  Width (cm): 0.8  Depth (cm): 0.1  Percent debrided: 100%  Surface Area (cm^2): 0.32  Area debrided (cm^2): 0.32  Volume (cm^3): 0.03  Tissue and other material debrided: subcutaneous tissue  Devitalized tissue debrided: biofilm and callus  Instrument(s) utilized: blade  Bleeding: small  Hemostasis obtained with: pressure  Procedural pain (0-10): 0  Post-procedural pain: 0   Response to treatment: procedure was tolerated well      Photo not available

## 2022-10-28 NOTE — PATIENT INSTRUCTIONS
Please return: 1 week  You can bring your shoes that you like and the new insoles and we will help you set it up. Patient discharge and wound care instructions  Ramirez Redd  10/28/2022       Do not change your dressing, unless you get it wet  Dressing:  felted foam offloading>hydrofera transfer    Nutrition and blood sugar control:  Focus on the following:  Protein: Meats, beans, eggs, milk and yogurt particularly [place] yogurt), tofu, soy nuts, soy protein products (Follow the protein handout in your welcome folder)  Vitamin C: Citrus fruits and juices, strawberries, tomatoes, tomato juice, peppers, baked potatoes, spinach, broccoli, cauliflower, [place] sprouts, cabbage  Vitamin A: Dark green, leafy vegetables, orange or yellow vegetables, cantaloupe, fortified dairy products, liver, fortified cereals  Zinc: Fortified cereals, red meats, seafood  Consider supplementing with Forrest by [employer] (These are essential branch chain amino acids that help with tissue building and wound healing) and take 2 packets/day. you can order online at [employer] or [facility]  When your blood sugar is consistently elevated greater than 180 your body can't heal or fight infection. Concerns:  Signs of infection may include the following:  Increase in redness  Red \"streaks\" from wound  Increase in swelling  Fever  Unusual odor  Change in the amount of wound drainage     Should you experience any significant changes in your wound(s) or have any questions regarding your home care instructions please contact the wound center [facility] @ [phone] If after regular business hours, please call your family doctor or local emergency room. The treatment plan has been discussed at length between you and your provider. Follow all instructions carefully, it is very important. If you do not follow all instructions you are at risk of your wound not healing, infection, possible loss of limb and even loss of life.

## 2022-11-04 ENCOUNTER — OFFICE VISIT (OUTPATIENT)
Dept: WOUND CARE | Facility: HOSPITAL | Age: 87
End: 2022-11-04
Attending: NURSE PRACTITIONER
Payer: MEDICARE

## 2022-11-04 VITALS
RESPIRATION RATE: 16 BRPM | SYSTOLIC BLOOD PRESSURE: 109 MMHG | HEART RATE: 64 BPM | TEMPERATURE: 98 F | DIASTOLIC BLOOD PRESSURE: 47 MMHG

## 2022-11-04 DIAGNOSIS — L97.512 NON-PRESSURE CHRONIC ULCER OF OTHER PART OF RIGHT FOOT WITH FAT LAYER EXPOSED (HCC): ICD-10-CM

## 2022-11-04 DIAGNOSIS — E11.621 DIABETIC ULCER OF OTHER PART OF RIGHT FOOT ASSOCIATED WITH TYPE 2 DIABETES MELLITUS, WITH FAT LAYER EXPOSED (HCC): Primary | ICD-10-CM

## 2022-11-04 DIAGNOSIS — L97.512 DIABETIC ULCER OF OTHER PART OF RIGHT FOOT ASSOCIATED WITH TYPE 2 DIABETES MELLITUS, WITH FAT LAYER EXPOSED (HCC): Primary | ICD-10-CM

## 2022-11-04 LAB — GLUCOSE BLD-MCNC: 143 MG/DL (ref 70–99)

## 2022-11-04 PROCEDURE — 82962 GLUCOSE BLOOD TEST: CPT | Performed by: NURSE PRACTITIONER

## 2022-11-04 PROCEDURE — 11042 DBRDMT SUBQ TIS 1ST 20SQCM/<: CPT | Performed by: NURSE PRACTITIONER

## 2022-11-04 NOTE — PROGRESS NOTES
Patient ID: Miller Paget is a 80year old male. Debridement   Wound 10/28/22 #1 right plantar great toe Diabetic Ulcer Toe (Comment which one) Right;Plantar    Consent obtained? verbal  Consent given by: patient    Performed by: provider  Debridement type: surgical  Level of debridement: subcutaneous tissue    Pre-debridement measurements  Length (cm): 0.3  Width (cm): 0.6  Depth (cm): 0.2  Surface Area (cm^2): 0.18  Volume (cm^3): 0.04    Post-debridement measurements  Length (cm): 0.4  Width (cm): 0.7  Depth (cm): 0.1  Percent debrided: 100%  Surface Area (cm^2): 0.28  Area debrided (cm^2): 0.28  Volume (cm^3): 0.03  Tissue and other material debrided: subcutaneous tissue  Devitalized tissue debrided: biofilm and callus  Instrument(s) utilized: blade and scissors  Bleeding: medium  Hemostasis obtained with: pressure  Procedural pain (0-10): 0  Post-procedural pain: 0   Response to treatment: procedure was tolerated well      Photo not available. Gel foam to wound.

## 2022-11-04 NOTE — PROGRESS NOTES
.Weekly Wound Education Note    Teaching Provided To: Patient; Family  Training Topics: Cleasing and general instructions; Discharge instructions;Dressing;Off-loading  Training Method: Explain/Verbal;Written  Training Response: Patient responds and understands; Reinforcement needed           Silver foam to wound, custom putty offloading piece made for toe.

## 2022-11-04 NOTE — PATIENT INSTRUCTIONS
Please return: 1 week  You can bring your shoes that you like and the new insoles and we will help you set it up. Patient discharge and wound care instructions  Early Beverly  11/4/2022       Do not change your dressing, unless you get it wet  Dressing:  silver foam with offloading pedifix puddy    Nutrition and blood sugar control:  Focus on the following:  Protein: Meats, beans, eggs, milk and yogurt particularly [place] yogurt), tofu, soy nuts, soy protein products (Follow the protein handout in your welcome folder)  Vitamin C: Citrus fruits and juices, strawberries, tomatoes, tomato juice, peppers, baked potatoes, spinach, broccoli, cauliflower, [place] sprouts, cabbage  Vitamin A: Dark green, leafy vegetables, orange or yellow vegetables, cantaloupe, fortified dairy products, liver, fortified cereals  Zinc: Fortified cereals, red meats, seafood  Consider supplementing with Forrest by [employer] (These are essential branch chain amino acids that help with tissue building and wound healing) and take 2 packets/day. you can order online at [employer] or [facility]  When your blood sugar is consistently elevated greater than 180 your body can't heal or fight infection. Concerns:  Signs of infection may include the following:  Increase in redness  Red \"streaks\" from wound  Increase in swelling  Fever  Unusual odor  Change in the amount of wound drainage     Should you experience any significant changes in your wound(s) or have any questions regarding your home care instructions please contact the wound center [facility] @ [phone] If after regular business hours, please call your family doctor or local emergency room. The treatment plan has been discussed at length between you and your provider. Follow all instructions carefully, it is very important. If you do not follow all instructions you are at risk of your wound not healing, infection, possible loss of limb and even loss of life.

## 2022-11-09 ENCOUNTER — MED REC SCAN ONLY (OUTPATIENT)
Dept: ORTHOPEDICS CLINIC | Facility: CLINIC | Age: 87
End: 2022-11-09

## 2022-11-11 ENCOUNTER — APPOINTMENT (OUTPATIENT)
Dept: WOUND CARE | Facility: HOSPITAL | Age: 87
End: 2022-11-11
Attending: NURSE PRACTITIONER
Payer: MEDICARE

## 2022-11-11 VITALS
RESPIRATION RATE: 18 BRPM | TEMPERATURE: 98 F | DIASTOLIC BLOOD PRESSURE: 84 MMHG | SYSTOLIC BLOOD PRESSURE: 157 MMHG | HEART RATE: 63 BPM

## 2022-11-11 DIAGNOSIS — L97.512 DIABETIC ULCER OF OTHER PART OF RIGHT FOOT ASSOCIATED WITH TYPE 2 DIABETES MELLITUS, WITH FAT LAYER EXPOSED (HCC): Primary | ICD-10-CM

## 2022-11-11 DIAGNOSIS — E11.621 DIABETIC ULCER OF OTHER PART OF RIGHT FOOT ASSOCIATED WITH TYPE 2 DIABETES MELLITUS, WITH FAT LAYER EXPOSED (HCC): Primary | ICD-10-CM

## 2022-11-11 LAB — GLUCOSE BLD-MCNC: 108 MG/DL (ref 70–99)

## 2022-11-11 PROCEDURE — 82962 GLUCOSE BLOOD TEST: CPT

## 2022-11-11 PROCEDURE — 99214 OFFICE O/P EST MOD 30 MIN: CPT

## 2022-11-15 ENCOUNTER — OFFICE VISIT (OUTPATIENT)
Dept: PODIATRY CLINIC | Facility: CLINIC | Age: 87
End: 2022-11-15
Payer: MEDICARE

## 2022-11-15 DIAGNOSIS — I73.89 OTHER SPECIFIED PERIPHERAL VASCULAR DISEASES (HCC): Primary | ICD-10-CM

## 2022-11-15 DIAGNOSIS — L97.501 SKIN ULCER OF TOE, LIMITED TO BREAKDOWN OF SKIN, UNSPECIFIED LATERALITY (HCC): ICD-10-CM

## 2022-11-15 PROCEDURE — 99213 OFFICE O/P EST LOW 20 MIN: CPT | Performed by: PODIATRIST

## 2022-11-17 ENCOUNTER — OFFICE VISIT (OUTPATIENT)
Dept: WOUND CARE | Facility: HOSPITAL | Age: 87
End: 2022-11-17
Attending: NURSE PRACTITIONER
Payer: MEDICARE

## 2022-11-17 VITALS
HEART RATE: 97 BPM | TEMPERATURE: 98 F | RESPIRATION RATE: 14 BRPM | DIASTOLIC BLOOD PRESSURE: 85 MMHG | SYSTOLIC BLOOD PRESSURE: 151 MMHG

## 2022-11-17 DIAGNOSIS — L97.512 NON-PRESSURE CHRONIC ULCER OF OTHER PART OF RIGHT FOOT WITH FAT LAYER EXPOSED (HCC): ICD-10-CM

## 2022-11-17 DIAGNOSIS — E11.621 DIABETIC ULCER OF OTHER PART OF RIGHT FOOT ASSOCIATED WITH TYPE 2 DIABETES MELLITUS, WITH FAT LAYER EXPOSED (HCC): Primary | ICD-10-CM

## 2022-11-17 DIAGNOSIS — L97.512 DIABETIC ULCER OF OTHER PART OF RIGHT FOOT ASSOCIATED WITH TYPE 2 DIABETES MELLITUS, WITH FAT LAYER EXPOSED (HCC): Primary | ICD-10-CM

## 2022-11-17 LAB — GLUCOSE BLD-MCNC: 112 MG/DL (ref 70–99)

## 2022-11-17 PROCEDURE — 99213 OFFICE O/P EST LOW 20 MIN: CPT

## 2022-11-17 PROCEDURE — 82962 GLUCOSE BLOOD TEST: CPT | Performed by: NURSE PRACTITIONER

## 2022-11-17 NOTE — PATIENT INSTRUCTIONS
Please return: 1 week-Friday  You can bring your shoes that you like and the new insoles and we will help you set it up. Patient discharge and wound care instructions  Dora Lombardi  11/17/2022        Do not change your dressing, unless you get it wet  Dressing:  pawan>silver foam with FELTED FOAM donut and darco shoe    Nutrition and blood sugar control:  Focus on the following:  Protein: Meats, beans, eggs, milk and yogurt particularly [place] yogurt), tofu, soy nuts, soy protein products (Follow the protein handout in your welcome folder)  Vitamin C: Citrus fruits and juices, strawberries, tomatoes, tomato juice, peppers, baked potatoes, spinach, broccoli, cauliflower, [place] sprouts, cabbage  Vitamin A: Dark green, leafy vegetables, orange or yellow vegetables, cantaloupe, fortified dairy products, liver, fortified cereals  Zinc: Fortified cereals, red meats, seafood  Consider supplementing with Forrest by [employer] (These are essential branch chain amino acids that help with tissue building and wound healing) and take 2 packets/day. you can order online at [employer] or [facility]  When your blood sugar is consistently elevated greater than 180 your body can't heal or fight infection. Concerns:  Signs of infection may include the following:  Increase in redness  Red \"streaks\" from wound  Increase in swelling  Fever  Unusual odor  Change in the amount of wound drainage     Should you experience any significant changes in your wound(s) or have any questions regarding your home care instructions please contact the wound center [facility] @ [phone] If after regular business hours, please call your family doctor or local emergency room. The treatment plan has been discussed at length between you and your provider. Follow all instructions carefully, it is very important. If you do not follow all instructions you are at risk of your wound not healing, infection, possible loss of limb and even loss of life.

## 2022-11-17 NOTE — PROGRESS NOTES
.Weekly Wound Education Note    Teaching Provided To: Family; Patient  Training Topics: Discharge instructions;Dressing;Cleasing and general instructions; Off-loading  Training Method: Explain/Verbal;Written  Training Response: Reinforcement needed        Notes: Wound stable.  Dressing changed to pawan, silver foam, and felted offloading foam.

## 2022-11-25 ENCOUNTER — OFFICE VISIT (OUTPATIENT)
Dept: WOUND CARE | Facility: HOSPITAL | Age: 87
End: 2022-11-25
Attending: NURSE PRACTITIONER
Payer: MEDICARE

## 2022-11-25 VITALS
DIASTOLIC BLOOD PRESSURE: 55 MMHG | SYSTOLIC BLOOD PRESSURE: 109 MMHG | RESPIRATION RATE: 14 BRPM | HEART RATE: 81 BPM | TEMPERATURE: 99 F

## 2022-11-25 DIAGNOSIS — E11.621 DIABETIC ULCER OF OTHER PART OF RIGHT FOOT ASSOCIATED WITH TYPE 2 DIABETES MELLITUS, WITH FAT LAYER EXPOSED (HCC): Primary | ICD-10-CM

## 2022-11-25 DIAGNOSIS — L97.512 NON-PRESSURE CHRONIC ULCER OF OTHER PART OF RIGHT FOOT WITH FAT LAYER EXPOSED (HCC): ICD-10-CM

## 2022-11-25 DIAGNOSIS — L97.512 DIABETIC ULCER OF OTHER PART OF RIGHT FOOT ASSOCIATED WITH TYPE 2 DIABETES MELLITUS, WITH FAT LAYER EXPOSED (HCC): Primary | ICD-10-CM

## 2022-11-25 LAB — GLUCOSE BLD-MCNC: 111 MG/DL (ref 70–99)

## 2022-11-25 PROCEDURE — 11042 DBRDMT SUBQ TIS 1ST 20SQCM/<: CPT | Performed by: NURSE PRACTITIONER

## 2022-11-25 PROCEDURE — 82962 GLUCOSE BLOOD TEST: CPT | Performed by: NURSE PRACTITIONER

## 2022-11-25 NOTE — PATIENT INSTRUCTIONS
Please return: 1 week        Patient discharge and wound care instructions  Noe Bryan  11/25/2022          Do not change your dressing, unless you get it wet  Dressing:  gel foam>silver foam     OFFLOADING:    pedifix insole in his shoes      Nutrition and blood sugar control:  Focus on the following:  Protein: Meats, beans, eggs, milk and yogurt particularly [place] yogurt), tofu, soy nuts, soy protein products (Follow the protein handout in your welcome folder)  Vitamin C: Citrus fruits and juices, strawberries, tomatoes, tomato juice, peppers, baked potatoes, spinach, broccoli, cauliflower, [place] sprouts, cabbage  Vitamin A: Dark green, leafy vegetables, orange or yellow vegetables, cantaloupe, fortified dairy products, liver, fortified cereals  Zinc: Fortified cereals, red meats, seafood  Consider supplementing with Forrest by [employer] (These are essential branch chain amino acids that help with tissue building and wound healing) and take 2 packets/day. you can order online at [employer] or [facility]  When your blood sugar is consistently elevated greater than 180 your body can't heal or fight infection. Concerns:  Signs of infection may include the following:  Increase in redness  Red \"streaks\" from wound  Increase in swelling  Fever  Unusual odor  Change in the amount of wound drainage     Should you experience any significant changes in your wound(s) or have any questions regarding your home care instructions please contact the wound center [facility] @ [phone] If after regular business hours, please call your family doctor or local emergency room. The treatment plan has been discussed at length between you and your provider. Follow all instructions carefully, it is very important. If you do not follow all instructions you are at risk of your wound not healing, infection, possible loss of limb and even loss of life.

## 2022-11-25 NOTE — PROGRESS NOTES
Patient ID: Kt Paul is a 80year old male.     Debridement   Wound 10/28/22 #1 right plantar great toe Diabetic Ulcer Toe (Comment which one) Right;Plantar    Consent obtained? verbal  Consent given by: patient    Performed by: provider  Debridement type: surgical  Level of debridement: subcutaneous tissue    Pre-debridement measurements  Length (cm): 0.2  Width (cm): 0.4  Depth (cm): 0.1  Surface Area (cm^2): 0.08  Volume (cm^3): 0.01    Post-debridement measurements  Length (cm): 0.2  Width (cm): 0.4  Depth (cm): 0.2  Percent debrided: 100%  Surface Area (cm^2): 0.08  Area debrided (cm^2): 0.08  Volume (cm^3): 0.02  Tissue and other material debrided: subcutaneous tissue  Devitalized tissue debrided: biofilm and callus  Instrument(s) utilized: blade  Bleeding: small  Hemostasis obtained with: pressure  Procedural pain (0-10): 0  Post-procedural pain: 0   Response to treatment: procedure was tolerated well

## 2022-11-28 ENCOUNTER — TELEPHONE (OUTPATIENT)
Dept: ORTHOPEDICS CLINIC | Facility: CLINIC | Age: 87
End: 2022-11-28

## 2022-11-28 NOTE — TELEPHONE ENCOUNTER
Patient's home health provider called to communicate that patient will be discharged from his service on 12/5/22.  Iris Ayalar can be reached at [phone]

## 2022-12-02 ENCOUNTER — OFFICE VISIT (OUTPATIENT)
Dept: WOUND CARE | Facility: HOSPITAL | Age: 87
End: 2022-12-02
Attending: NURSE PRACTITIONER
Payer: MEDICARE

## 2022-12-02 VITALS
SYSTOLIC BLOOD PRESSURE: 131 MMHG | TEMPERATURE: 99 F | DIASTOLIC BLOOD PRESSURE: 75 MMHG | RESPIRATION RATE: 16 BRPM | HEART RATE: 90 BPM

## 2022-12-02 DIAGNOSIS — E11.621 DIABETIC ULCER OF OTHER PART OF RIGHT FOOT ASSOCIATED WITH TYPE 2 DIABETES MELLITUS, WITH FAT LAYER EXPOSED (HCC): Primary | ICD-10-CM

## 2022-12-02 DIAGNOSIS — L97.512 DIABETIC ULCER OF OTHER PART OF RIGHT FOOT ASSOCIATED WITH TYPE 2 DIABETES MELLITUS, WITH FAT LAYER EXPOSED (HCC): Primary | ICD-10-CM

## 2022-12-02 DIAGNOSIS — L97.512 NON-PRESSURE CHRONIC ULCER OF OTHER PART OF RIGHT FOOT WITH FAT LAYER EXPOSED (HCC): ICD-10-CM

## 2022-12-02 LAB — GLUCOSE BLD-MCNC: 133 MG/DL (ref 70–99)

## 2022-12-02 PROCEDURE — 99213 OFFICE O/P EST LOW 20 MIN: CPT | Performed by: NURSE PRACTITIONER

## 2022-12-02 NOTE — PROGRESS NOTES
.Weekly Wound Education Note    Teaching Provided To: Patient; Family  Training Topics: Discharge instructions;Cleasing and general instructions;Dressing;Off-loading  Training Method: Explain/Verbal;Written  Training Response: Patient responds and understands; Reinforcement needed         Continue silver foam to wound. Shoe insert removed, patient only using the new peg insert purchased. Extra supplies provided for dressing changes.

## 2022-12-02 NOTE — PATIENT INSTRUCTIONS
Please return: 1 week        Patient discharge and wound care instructions  Noe Richmond  12/2/2022        Do not change your dressing, unless you get it wet  Dressing:  silver foam     OFFLOADING:    pedifix insole in his shoes      Nutrition and blood sugar control:  Focus on the following:  Protein: Meats, beans, eggs, milk and yogurt particularly [place] yogurt), tofu, soy nuts, soy protein products (Follow the protein handout in your welcome folder)  Vitamin C: Citrus fruits and juices, strawberries, tomatoes, tomato juice, peppers, baked potatoes, spinach, broccoli, cauliflower, [place] sprouts, cabbage  Vitamin A: Dark green, leafy vegetables, orange or yellow vegetables, cantaloupe, fortified dairy products, liver, fortified cereals  Zinc: Fortified cereals, red meats, seafood  Consider supplementing with Forrest by [employer] (These are essential branch chain amino acids that help with tissue building and wound healing) and take 2 packets/day. you can order online at [employer] or [facility]  When your blood sugar is consistently elevated greater than 180 your body can't heal or fight infection. Concerns:  Signs of infection may include the following:  Increase in redness  Red \"streaks\" from wound  Increase in swelling  Fever  Unusual odor  Change in the amount of wound drainage     Should you experience any significant changes in your wound(s) or have any questions regarding your home care instructions please contact the wound center [facility] @ [phone] If after regular business hours, please call your family doctor or local emergency room. The treatment plan has been discussed at length between you and your provider. Follow all instructions carefully, it is very important. If you do not follow all instructions you are at risk of your wound not healing, infection, possible loss of limb and even loss of life.

## 2022-12-09 ENCOUNTER — OFFICE VISIT (OUTPATIENT)
Dept: WOUND CARE | Facility: HOSPITAL | Age: 87
End: 2022-12-09
Attending: NURSE PRACTITIONER
Payer: MEDICARE

## 2022-12-09 VITALS
TEMPERATURE: 98 F | SYSTOLIC BLOOD PRESSURE: 133 MMHG | RESPIRATION RATE: 16 BRPM | DIASTOLIC BLOOD PRESSURE: 78 MMHG | HEART RATE: 95 BPM

## 2022-12-09 DIAGNOSIS — L97.512 DIABETIC ULCER OF OTHER PART OF RIGHT FOOT ASSOCIATED WITH TYPE 2 DIABETES MELLITUS, WITH FAT LAYER EXPOSED (HCC): Primary | ICD-10-CM

## 2022-12-09 DIAGNOSIS — E11.621 DIABETIC ULCER OF OTHER PART OF RIGHT FOOT ASSOCIATED WITH TYPE 2 DIABETES MELLITUS, WITH FAT LAYER EXPOSED (HCC): Primary | ICD-10-CM

## 2022-12-09 DIAGNOSIS — L97.512 NON-PRESSURE CHRONIC ULCER OF OTHER PART OF RIGHT FOOT WITH FAT LAYER EXPOSED (HCC): ICD-10-CM

## 2022-12-09 LAB — GLUCOSE BLD-MCNC: 124 MG/DL (ref 70–99)

## 2022-12-09 PROCEDURE — 99213 OFFICE O/P EST LOW 20 MIN: CPT | Performed by: NURSE PRACTITIONER

## 2022-12-09 NOTE — PROGRESS NOTES
.Weekly Wound Education Note    Teaching Provided To: Patient; Family  Training Topics: Dressing; Discharge instructions;Cleasing and general instructions  Training Method: Explain/Verbal;Written  Training Response: Patient responds and understands; Reinforcement needed           Xeroform, bandaid, toe sleeve.

## 2022-12-09 NOTE — PATIENT INSTRUCTIONS
Please return: 2 week        Patient discharge and wound care instructions  Noe Ogden  12/9/2022      Change your dressing every 2-3 days  Dressing:  xeroform gauze>bandaid>pink toe cap    OFFLOADING:    pedifix insole in his shoes      Nutrition and blood sugar control:  Focus on the following:  Protein: Meats, beans, eggs, milk and yogurt particularly [place] yogurt), tofu, soy nuts, soy protein products (Follow the protein handout in your welcome folder)  Vitamin C: Citrus fruits and juices, strawberries, tomatoes, tomato juice, peppers, baked potatoes, spinach, broccoli, cauliflower, [place] sprouts, cabbage  Vitamin A: Dark green, leafy vegetables, orange or yellow vegetables, cantaloupe, fortified dairy products, liver, fortified cereals  Zinc: Fortified cereals, red meats, seafood  Consider supplementing with Forrest by [employer] (These are essential branch chain amino acids that help with tissue building and wound healing) and take 2 packets/day. you can order online at [employer] or [facility]  When your blood sugar is consistently elevated greater than 180 your body can't heal or fight infection. Concerns:  Signs of infection may include the following:  Increase in redness  Red \"streaks\" from wound  Increase in swelling  Fever  Unusual odor  Change in the amount of wound drainage     Should you experience any significant changes in your wound(s) or have any questions regarding your home care instructions please contact the wound center [facility] @ [phone] If after regular business hours, please call your family doctor or local emergency room. The treatment plan has been discussed at length between you and your provider. Follow all instructions carefully, it is very important. If you do not follow all instructions you are at risk of your wound not healing, infection, possible loss of limb and even loss of life.

## 2022-12-14 RX ORDER — SIMVASTATIN 10 MG
TABLET ORAL
Qty: 90 TABLET | Refills: 0 | Status: SHIPPED | OUTPATIENT
Start: 2022-12-14

## 2022-12-14 RX ORDER — GLIPIZIDE 2.5 MG/1
TABLET, EXTENDED RELEASE ORAL
Qty: 360 TABLET | Refills: 0 | Status: SHIPPED | OUTPATIENT
Start: 2022-12-14

## 2022-12-17 ENCOUNTER — MED REC SCAN ONLY (OUTPATIENT)
Dept: ORTHOPEDICS CLINIC | Facility: CLINIC | Age: 87
End: 2022-12-17

## 2022-12-22 ENCOUNTER — TELEPHONE (OUTPATIENT)
Dept: SURGERY | Facility: CLINIC | Age: 87
End: 2022-12-22

## 2022-12-23 ENCOUNTER — APPOINTMENT (OUTPATIENT)
Dept: WOUND CARE | Facility: HOSPITAL | Age: 87
End: 2022-12-23
Attending: NURSE PRACTITIONER
Payer: MEDICARE

## 2022-12-27 ENCOUNTER — OFFICE VISIT (OUTPATIENT)
Dept: WOUND CARE | Facility: HOSPITAL | Age: 87
End: 2022-12-27
Attending: NURSE PRACTITIONER
Payer: MEDICARE

## 2022-12-27 VITALS
RESPIRATION RATE: 16 BRPM | TEMPERATURE: 98 F | SYSTOLIC BLOOD PRESSURE: 144 MMHG | HEART RATE: 96 BPM | DIASTOLIC BLOOD PRESSURE: 70 MMHG

## 2022-12-27 DIAGNOSIS — E11.621 DIABETIC ULCER OF OTHER PART OF RIGHT FOOT ASSOCIATED WITH TYPE 2 DIABETES MELLITUS, WITH FAT LAYER EXPOSED (HCC): Primary | ICD-10-CM

## 2022-12-27 DIAGNOSIS — L97.512 DIABETIC ULCER OF OTHER PART OF RIGHT FOOT ASSOCIATED WITH TYPE 2 DIABETES MELLITUS, WITH FAT LAYER EXPOSED (HCC): Primary | ICD-10-CM

## 2022-12-27 DIAGNOSIS — L97.512 NON-PRESSURE CHRONIC ULCER OF OTHER PART OF RIGHT FOOT WITH FAT LAYER EXPOSED (HCC): ICD-10-CM

## 2022-12-27 LAB — GLUCOSE BLD-MCNC: 151 MG/DL (ref 70–99)

## 2022-12-27 PROCEDURE — 99213 OFFICE O/P EST LOW 20 MIN: CPT | Performed by: NURSE PRACTITIONER

## 2022-12-27 PROCEDURE — 97597 DBRDMT OPN WND 1ST 20 CM/<: CPT

## 2022-12-27 NOTE — PATIENT INSTRUCTIONS
Please return: 2 week    LOOK FOR NEW SHOES AT [facility]        Patient discharge and wound care instructions  Piotr Messer  12/27/2022        Change your dressing every 2-3 days  Dressing:  colOplast paste>bandaid    OFFLOADING:    pedifix insole in his shoes      Nutrition and blood sugar control:  Focus on the following:  Protein: Meats, beans, eggs, milk and yogurt particularly [place] yogurt), tofu, soy nuts, soy protein products (Follow the protein handout in your welcome folder)  Vitamin C: Citrus fruits and juices, strawberries, tomatoes, tomato juice, peppers, baked potatoes, spinach, broccoli, cauliflower, [place] sprouts, cabbage  Vitamin A: Dark green, leafy vegetables, orange or yellow vegetables, cantaloupe, fortified dairy products, liver, fortified cereals  Zinc: Fortified cereals, red meats, seafood  Consider supplementing with Forrest by [employer] (These are essential branch chain amino acids that help with tissue building and wound healing) and take 2 packets/day. you can order online at [employer] or [facility]  When your blood sugar is consistently elevated greater than 180 your body can't heal or fight infection. Concerns:  Signs of infection may include the following:  Increase in redness  Red \"streaks\" from wound  Increase in swelling  Fever  Unusual odor  Change in the amount of wound drainage     Should you experience any significant changes in your wound(s) or have any questions regarding your home care instructions please contact the wound center [facility] @ [phone] If after regular business hours, please call your family doctor or local emergency room. The treatment plan has been discussed at length between you and your provider. Follow all instructions carefully, it is very important. If you do not follow all instructions you are at risk of your wound not healing, infection, possible loss of limb and even loss of life.

## 2022-12-27 NOTE — PROGRESS NOTES
Patient ID: Piotr Messer is a 80year old male.     Debridement   Wound 10/28/22 #1 right plantar great toe Diabetic Ulcer Toe (Comment which one) Right;Plantar    Consent obtained? verbal  Consent given by: patient    Performed by: provider  Debridement type: selective    Pre-debridement measurements  Length (cm): 0.1  Width (cm): 0.1  Depth (cm): 0.2  Surface Area (cm^2): 0.01  Volume (cm^3): 0    Post-debridement measurements  Length (cm): 0.2  Width (cm): 0.4  Depth (cm): 0.2  Percent debrided: 100%  Surface Area (cm^2): 0.08  Area debrided (cm^2): 0.08  Volume (cm^3): 0.02  Devitalized tissue debrided: biofilm and callus  Instrument(s) utilized: nippers  Bleeding: small  Hemostasis obtained with: silver nitrate  Procedural pain (0-10): 0  Post-procedural pain: 0   Response to treatment: procedure was tolerated well

## 2022-12-27 NOTE — PROGRESS NOTES
.Weekly Wound Education Note    Teaching Provided To: Patient; Family  Training Topics: Discharge instructions;Dressing;Cleasing and general instructions  Training Method: Explain/Verbal;Written  Training Response: Patient responds and understands        Notes: Wound stable. Dressing changed to coloplast triad paste and bandaid.

## 2023-01-03 ENCOUNTER — TELEPHONE (OUTPATIENT)
Dept: FAMILY MEDICINE CLINIC | Facility: CLINIC | Age: 88
End: 2023-01-03

## 2023-01-03 NOTE — TELEPHONE ENCOUNTER
Patients daughter calling, she is POA and is needing a letter from Dr. Fauzia Brayd indicating that we are to send past year medical history, physicals, appts, immunization, labs to the [facility] in [place], [place]. Roney Hannon. Fax [phone]  [facility] [phone]    Once we have a letter, our office needs to fax to our Medical Record Department. Daughter states that she spoke with Yadi Lopes in our Medical Records department and she is the one that said she needs to receive request on letterhead from Dr. Alok Shelton office.

## 2023-01-04 NOTE — TELEPHONE ENCOUNTER
To clarify my last note, can we make sure they didn't mean they wanted the request from the [facility]? If not, then I am ok with this.  Its just normally we process things as requests from the place receiving the info, not us requesiting our own department to send it        Keily Larose MD

## 2023-01-05 NOTE — TELEPHONE ENCOUNTER
Spoke to [place] again, she state she was told by our medical records department they need request from our office.  I faxed them a letter

## 2023-01-06 RX ORDER — TAMSULOSIN HYDROCHLORIDE 0.4 MG/1
CAPSULE ORAL
Qty: 30 CAPSULE | Refills: 0 | OUTPATIENT
Start: 2023-01-06

## 2023-01-07 ENCOUNTER — HOSPITAL ENCOUNTER (INPATIENT)
Facility: HOSPITAL | Age: 88
LOS: 4 days | Discharge: NURSING, REHAB OR LONG-TERM CARE | End: 2023-01-11
Attending: EMERGENCY MEDICINE | Admitting: HOSPITALIST
Payer: MEDICARE

## 2023-01-07 ENCOUNTER — APPOINTMENT (OUTPATIENT)
Dept: GENERAL RADIOLOGY | Facility: HOSPITAL | Age: 88
End: 2023-01-07
Attending: EMERGENCY MEDICINE
Payer: MEDICARE

## 2023-01-07 ENCOUNTER — HOSPITAL ENCOUNTER (INPATIENT)
Facility: HOSPITAL | Age: 88
LOS: 4 days | Discharge: NURSING, REHAB OR LONG-TERM CARE | End: 2023-01-11
Attending: EMERGENCY MEDICINE | Admitting: HOSPITALIST
Payer: MEDICARE

## 2023-01-07 DIAGNOSIS — I50.9 NEW ONSET OF CONGESTIVE HEART FAILURE (HCC): Primary | ICD-10-CM

## 2023-01-07 DIAGNOSIS — R77.8 ELEVATED TROPONIN: ICD-10-CM

## 2023-01-07 PROBLEM — R79.89 ELEVATED TROPONIN: Status: ACTIVE | Noted: 2023-01-07

## 2023-01-07 LAB
ALBUMIN SERPL-MCNC: 3.6 G/DL (ref 3.4–5)
ALBUMIN/GLOB SERPL: 1 {RATIO} (ref 1–2)
ALP LIVER SERPL-CCNC: 101 U/L
ALT SERPL-CCNC: 34 U/L
ANION GAP SERPL CALC-SCNC: 5 MMOL/L (ref 0–18)
APTT PPP: 31.1 SECONDS (ref 23.3–35.6)
AST SERPL-CCNC: 25 U/L (ref 15–37)
ATRIAL RATE: 87 BPM
BASOPHILS # BLD AUTO: 0.03 X10(3) UL (ref 0–0.2)
BASOPHILS NFR BLD AUTO: 0.3 %
BILIRUB SERPL-MCNC: 1.3 MG/DL (ref 0.1–2)
BUN BLD-MCNC: 18 MG/DL (ref 7–18)
CALCIUM BLD-MCNC: 9.1 MG/DL (ref 8.5–10.1)
CHLORIDE SERPL-SCNC: 114 MMOL/L (ref 98–112)
CHOLEST SERPL-MCNC: 131 MG/DL (ref ?–200)
CO2 SERPL-SCNC: 23 MMOL/L (ref 21–32)
CREAT BLD-MCNC: 1.1 MG/DL
D DIMER PPP FEU-MCNC: 0.5 UG/ML FEU (ref ?–0.95)
EOSINOPHIL # BLD AUTO: 0.11 X10(3) UL (ref 0–0.7)
EOSINOPHIL NFR BLD AUTO: 1.3 %
ERYTHROCYTE [DISTWIDTH] IN BLOOD BY AUTOMATED COUNT: 13.2 %
EST. AVERAGE GLUCOSE BLD GHB EST-MCNC: 111 MG/DL (ref 68–126)
GFR SERPLBLD BASED ON 1.73 SQ M-ARVRAT: 62 ML/MIN/1.73M2 (ref 60–?)
GLOBULIN PLAS-MCNC: 3.5 G/DL (ref 2.8–4.4)
GLUCOSE BLD-MCNC: 101 MG/DL (ref 70–99)
GLUCOSE BLD-MCNC: 124 MG/DL (ref 70–99)
GLUCOSE BLD-MCNC: 96 MG/DL (ref 70–99)
HBA1C MFR BLD: 5.5 % (ref ?–5.7)
HCT VFR BLD AUTO: 46.5 %
HDLC SERPL-MCNC: 50 MG/DL (ref 40–59)
HGB BLD-MCNC: 15.6 G/DL
IMM GRANULOCYTES # BLD AUTO: 0.03 X10(3) UL (ref 0–1)
IMM GRANULOCYTES NFR BLD: 0.3 %
LDLC SERPL CALC-MCNC: 63 MG/DL (ref ?–100)
LYMPHOCYTES # BLD AUTO: 1.25 X10(3) UL (ref 1–4)
LYMPHOCYTES NFR BLD AUTO: 14.4 %
MCH RBC QN AUTO: 29.4 PG (ref 26–34)
MCHC RBC AUTO-ENTMCNC: 33.5 G/DL (ref 31–37)
MCV RBC AUTO: 87.7 FL
MONOCYTES # BLD AUTO: 0.65 X10(3) UL (ref 0.1–1)
MONOCYTES NFR BLD AUTO: 7.5 %
NEUTROPHILS # BLD AUTO: 6.61 X10 (3) UL (ref 1.5–7.7)
NEUTROPHILS # BLD AUTO: 6.61 X10(3) UL (ref 1.5–7.7)
NEUTROPHILS NFR BLD AUTO: 76.2 %
NONHDLC SERPL-MCNC: 81 MG/DL (ref ?–130)
NT-PROBNP SERPL-MCNC: ABNORMAL PG/ML (ref ?–450)
OSMOLALITY SERPL CALC.SUM OF ELEC: 297 MOSM/KG (ref 275–295)
P AXIS: 18 DEGREES
P-R INTERVAL: 162 MS
PLATELET # BLD AUTO: 209 10(3)UL (ref 150–450)
POTASSIUM SERPL-SCNC: 3.8 MMOL/L (ref 3.5–5.1)
PROT SERPL-MCNC: 7.1 G/DL (ref 6.4–8.2)
Q-T INTERVAL: 400 MS
QRS DURATION: 126 MS
QTC CALCULATION (BEZET): 481 MS
R AXIS: -57 DEGREES
RBC # BLD AUTO: 5.3 X10(6)UL
SARS-COV-2 RNA RESP QL NAA+PROBE: NOT DETECTED
SODIUM SERPL-SCNC: 142 MMOL/L (ref 136–145)
T AXIS: 54 DEGREES
TRIGL SERPL-MCNC: 99 MG/DL (ref 30–149)
TROPONIN I HIGH SENSITIVITY: 269 NG/L
VENTRICULAR RATE: 87 BPM
VLDLC SERPL CALC-MCNC: 15 MG/DL (ref 0–30)
WBC # BLD AUTO: 8.7 X10(3) UL (ref 4–11)

## 2023-01-07 PROCEDURE — 71045 X-RAY EXAM CHEST 1 VIEW: CPT | Performed by: EMERGENCY MEDICINE

## 2023-01-07 PROCEDURE — 99223 1ST HOSP IP/OBS HIGH 75: CPT | Performed by: HOSPITALIST

## 2023-01-07 RX ORDER — TAMSULOSIN HYDROCHLORIDE 0.4 MG/1
0.4 CAPSULE ORAL EVERY EVENING
COMMUNITY

## 2023-01-07 RX ORDER — POLYETHYLENE GLYCOL 3350 17 G/17G
17 POWDER, FOR SOLUTION ORAL DAILY PRN
Status: DISCONTINUED | OUTPATIENT
Start: 2023-01-07 | End: 2023-01-11

## 2023-01-07 RX ORDER — GLIPIZIDE 2.5 MG/1
5 TABLET, EXTENDED RELEASE ORAL 2 TIMES DAILY
COMMUNITY

## 2023-01-07 RX ORDER — SENNOSIDES 8.6 MG
17.2 TABLET ORAL NIGHTLY PRN
Status: DISCONTINUED | OUTPATIENT
Start: 2023-01-07 | End: 2023-01-11

## 2023-01-07 RX ORDER — LOSARTAN POTASSIUM 100 MG/1
100 TABLET ORAL DAILY
Status: DISCONTINUED | OUTPATIENT
Start: 2023-01-08 | End: 2023-01-08

## 2023-01-07 RX ORDER — FUROSEMIDE 10 MG/ML
20 INJECTION INTRAMUSCULAR; INTRAVENOUS
Status: DISCONTINUED | OUTPATIENT
Start: 2023-01-07 | End: 2023-01-10

## 2023-01-07 RX ORDER — ENOXAPARIN SODIUM 100 MG/ML
40 INJECTION SUBCUTANEOUS DAILY
Status: DISCONTINUED | OUTPATIENT
Start: 2023-01-08 | End: 2023-01-11

## 2023-01-07 RX ORDER — DEXTROSE MONOHYDRATE 25 G/50ML
50 INJECTION, SOLUTION INTRAVENOUS
Status: DISCONTINUED | OUTPATIENT
Start: 2023-01-07 | End: 2023-01-11

## 2023-01-07 RX ORDER — ASPIRIN 81 MG/1
324 TABLET, CHEWABLE ORAL ONCE
Status: COMPLETED | OUTPATIENT
Start: 2023-01-07 | End: 2023-01-07

## 2023-01-07 RX ORDER — CHOLECALCIFEROL (VITAMIN D3) 25 MCG
1 TABLET,CHEWABLE ORAL DAILY
COMMUNITY
Start: 2022-09-01 | End: 2023-01-07 | Stop reason: CLARIF

## 2023-01-07 RX ORDER — ACETAMINOPHEN 500 MG
500 TABLET ORAL EVERY 4 HOURS PRN
Status: DISCONTINUED | OUTPATIENT
Start: 2023-01-07 | End: 2023-01-11

## 2023-01-07 RX ORDER — NICOTINE POLACRILEX 4 MG
15 LOZENGE BUCCAL
Status: DISCONTINUED | OUTPATIENT
Start: 2023-01-07 | End: 2023-01-11

## 2023-01-07 RX ORDER — FUROSEMIDE 10 MG/ML
40 INJECTION INTRAMUSCULAR; INTRAVENOUS ONCE
Status: COMPLETED | OUTPATIENT
Start: 2023-01-07 | End: 2023-01-07

## 2023-01-07 RX ORDER — ASPIRIN 325 MG
325 TABLET, DELAYED RELEASE (ENTERIC COATED) ORAL DAILY
Status: DISCONTINUED | OUTPATIENT
Start: 2023-01-07 | End: 2023-01-08

## 2023-01-07 RX ORDER — BISACODYL 10 MG
10 SUPPOSITORY, RECTAL RECTAL
Status: DISCONTINUED | OUTPATIENT
Start: 2023-01-07 | End: 2023-01-11

## 2023-01-07 RX ORDER — SODIUM PHOSPHATE, DIBASIC AND SODIUM PHOSPHATE, MONOBASIC 7; 19 G/133ML; G/133ML
1 ENEMA RECTAL ONCE AS NEEDED
Status: DISCONTINUED | OUTPATIENT
Start: 2023-01-07 | End: 2023-01-11

## 2023-01-07 RX ORDER — ONDANSETRON 2 MG/ML
4 INJECTION INTRAMUSCULAR; INTRAVENOUS EVERY 6 HOURS PRN
Status: DISCONTINUED | OUTPATIENT
Start: 2023-01-07 | End: 2023-01-11

## 2023-01-07 RX ORDER — SIMVASTATIN 10 MG
10 TABLET ORAL NIGHTLY
COMMUNITY

## 2023-01-07 RX ORDER — TAMSULOSIN HYDROCHLORIDE 0.4 MG/1
0.4 CAPSULE ORAL DAILY
Status: DISCONTINUED | OUTPATIENT
Start: 2023-01-07 | End: 2023-01-11

## 2023-01-07 RX ORDER — NICOTINE POLACRILEX 4 MG
30 LOZENGE BUCCAL
Status: DISCONTINUED | OUTPATIENT
Start: 2023-01-07 | End: 2023-01-11

## 2023-01-07 NOTE — ED INITIAL ASSESSMENT (HPI)
Patient reports intermittent SOB and pressure CP for approximately 1 week. VENKATA on exertion.  Denies fever, cough, N/V.

## 2023-01-08 ENCOUNTER — APPOINTMENT (OUTPATIENT)
Dept: CV DIAGNOSTICS | Facility: HOSPITAL | Age: 88
End: 2023-01-08
Attending: HOSPITALIST
Payer: MEDICARE

## 2023-01-08 LAB
ANION GAP SERPL CALC-SCNC: 5 MMOL/L (ref 0–18)
BUN BLD-MCNC: 17 MG/DL (ref 7–18)
CALCIUM BLD-MCNC: 8.5 MG/DL (ref 8.5–10.1)
CHLORIDE SERPL-SCNC: 115 MMOL/L (ref 98–112)
CO2 SERPL-SCNC: 24 MMOL/L (ref 21–32)
CREAT BLD-MCNC: 0.87 MG/DL
GFR SERPLBLD BASED ON 1.73 SQ M-ARVRAT: 79 ML/MIN/1.73M2 (ref 60–?)
GLUCOSE BLD-MCNC: 104 MG/DL (ref 70–99)
GLUCOSE BLD-MCNC: 125 MG/DL (ref 70–99)
GLUCOSE BLD-MCNC: 151 MG/DL (ref 70–99)
GLUCOSE BLD-MCNC: 172 MG/DL (ref 70–99)
GLUCOSE BLD-MCNC: 94 MG/DL (ref 70–99)
INR BLD: 1.1 (ref 0.85–1.16)
MAGNESIUM SERPL-MCNC: 2.2 MG/DL (ref 1.6–2.6)
OSMOLALITY SERPL CALC.SUM OF ELEC: 300 MOSM/KG (ref 275–295)
POTASSIUM SERPL-SCNC: 3.3 MMOL/L (ref 3.5–5.1)
POTASSIUM SERPL-SCNC: 4.2 MMOL/L (ref 3.5–5.1)
PROCALCITONIN SERPL-MCNC: <0.05 NG/ML (ref ?–0.16)
PROTHROMBIN TIME: 14.2 SECONDS (ref 11.6–14.8)
SODIUM SERPL-SCNC: 144 MMOL/L (ref 136–145)
TROPONIN I HIGH SENSITIVITY: 187 NG/L
TROPONIN I HIGH SENSITIVITY: 210 NG/L

## 2023-01-08 PROCEDURE — 99232 SBSQ HOSP IP/OBS MODERATE 35: CPT | Performed by: HOSPITALIST

## 2023-01-08 PROCEDURE — 93306 TTE W/DOPPLER COMPLETE: CPT | Performed by: HOSPITALIST

## 2023-01-08 RX ORDER — ASPIRIN 81 MG/1
81 TABLET ORAL DAILY
Status: DISCONTINUED | OUTPATIENT
Start: 2023-01-09 | End: 2023-01-11

## 2023-01-08 RX ORDER — LOSARTAN POTASSIUM 50 MG/1
50 TABLET ORAL DAILY
Status: DISCONTINUED | OUTPATIENT
Start: 2023-01-09 | End: 2023-01-10

## 2023-01-08 RX ORDER — EPLERENONE 25 MG/1
25 TABLET, FILM COATED ORAL DAILY
Status: DISCONTINUED | OUTPATIENT
Start: 2023-01-08 | End: 2023-01-11

## 2023-01-08 RX ORDER — POTASSIUM CHLORIDE 20 MEQ/1
40 TABLET, EXTENDED RELEASE ORAL EVERY 4 HOURS
Status: COMPLETED | OUTPATIENT
Start: 2023-01-08 | End: 2023-01-08

## 2023-01-08 NOTE — PLAN OF CARE
Assumed care of patient at 1. Patient alert and oriented to self and place-can be disoriented to time and situation occasionally, but can be easily redirected/reoriented. On room air. Lucia Johnson noted with minimal exertion including speaking. Patient denies any pain. Patient updated on plan of care. Call light within reach, bed alarm in place. 0100: 2L placed on patient due to saturations dropping to 80s     POC: IV lasix, echo, Daily weights, fluid restriction, Qid Accucheck    Problem: CARDIOVASCULAR - ADULT  Goal: Maintains optimal cardiac output and hemodynamic stability  Description: INTERVENTIONS:  - Monitor vital signs, rhythm, and trends  - Monitor for bleeding, hypotension and signs of decreased cardiac output  - Evaluate effectiveness of vasoactive medications to optimize hemodynamic stability  - Monitor arterial and/or venous puncture sites for bleeding and/or hematoma  - Assess quality of pulses, skin color and temperature  - Assess for signs of decreased coronary artery perfusion - ex.  Angina  - Evaluate fluid balance, assess for edema, trend weights  Outcome: Progressing  Goal: Absence of cardiac arrhythmias or at baseline  Description: INTERVENTIONS:  - Continuous cardiac monitoring, monitor vital signs, obtain 12 lead EKG if indicated  - Evaluate effectiveness of antiarrhythmic and heart rate control medications as ordered  - Initiate emergency measures for life threatening arrhythmias  - Monitor electrolytes and administer replacement therapy as ordered  Outcome: Progressing     Problem: Patient/Family Goals  Goal: Patient/Family Long Term Goal  Description: Patient's Long Term Goal: return home and not come back to the hospital    Interventions:  - follow up appts  - medication regimen  - See additional Care Plan goals for specific interventions  Outcome: Progressing  Goal: Patient/Family Short Term Goal  Description: Patient's Short Term Goal: breathe more comfortably    Interventions:   - IV Lasix  - cardiac consult  - echo   - See additional Care Plan goals for specific interventions  Outcome: Progressing     Problem: Diabetes/Glucose Control  Goal: Glucose maintained within prescribed range  Description: INTERVENTIONS:  - Monitor Blood Glucose as ordered  - Assess for signs and symptoms of hyperglycemia and hypoglycemia  - Administer ordered medications to maintain glucose within target range  - Assess barriers to adequate nutritional intake and initiate nutrition consult as needed  - Instruct patient on self management of diabetes  Outcome: Progressing

## 2023-01-08 NOTE — PLAN OF CARE
Assumed pt care at 0730. Alert and oriented to self, place and time. Pt denies sob, cp. Lasix bid given per order. Primo fit in place for bladder incontinent. Pt NSR on tele. O2 sats in >90's on RA. Updated on POC. Safety measures in place. Call light with in reach, needs met. Problem: CARDIOVASCULAR - ADULT  Goal: Maintains optimal cardiac output and hemodynamic stability  Description: INTERVENTIONS:  - Monitor vital signs, rhythm, and trends  - Monitor for bleeding, hypotension and signs of decreased cardiac output  - Evaluate effectiveness of vasoactive medications to optimize hemodynamic stability  - Monitor arterial and/or venous puncture sites for bleeding and/or hematoma  - Assess quality of pulses, skin color and temperature  - Assess for signs of decreased coronary artery perfusion - ex.  Angina  - Evaluate fluid balance, assess for edema, trend weights  Outcome: Progressing  Goal: Absence of cardiac arrhythmias or at baseline  Description: INTERVENTIONS:  - Continuous cardiac monitoring, monitor vital signs, obtain 12 lead EKG if indicated  - Evaluate effectiveness of antiarrhythmic and heart rate control medications as ordered  - Initiate emergency measures for life threatening arrhythmias  - Monitor electrolytes and administer replacement therapy as ordered  Outcome: Progressing     Problem: Patient/Family Goals  Goal: Patient/Family Long Term Goal  Description: Patient's Long Term Goal: return home and not come back to the hospital    Interventions:  - follow up appts  - medication regimen  - See additional Care Plan goals for specific interventions  Outcome: Progressing  Goal: Patient/Family Short Term Goal  Description: Patient's Short Term Goal: breathe more comfortably    Interventions:   - IV Lasix  - cardiac consult  - echo   - See additional Care Plan goals for specific interventions  Outcome: Progressing     Problem: Diabetes/Glucose Control  Goal: Glucose maintained within prescribed range  Description: INTERVENTIONS:  - Monitor Blood Glucose as ordered  - Assess for signs and symptoms of hyperglycemia and hypoglycemia  - Administer ordered medications to maintain glucose within target range  - Assess barriers to adequate nutritional intake and initiate nutrition consult as needed  - Instruct patient on self management of diabetes  Outcome: Progressing

## 2023-01-08 NOTE — PLAN OF CARE
Assumed care of patient at [address]. Patient alert and oriented to self and place-can be disoriented to time and situation occasionally, but can be easily redirected/reoriented. On room air. Liborio Child noted with minimal exertion including speaking. Patient denies any pain. Patient updated on plan of care. Call light within reach, bed alarm in place. POC: IV lasix, echo, Daily weights, fluid restriction, Qid Accucheck    Problem: CARDIOVASCULAR - ADULT  Goal: Maintains optimal cardiac output and hemodynamic stability  Description: INTERVENTIONS:  - Monitor vital signs, rhythm, and trends  - Monitor for bleeding, hypotension and signs of decreased cardiac output  - Evaluate effectiveness of vasoactive medications to optimize hemodynamic stability  - Monitor arterial and/or venous puncture sites for bleeding and/or hematoma  - Assess quality of pulses, skin color and temperature  - Assess for signs of decreased coronary artery perfusion - ex.  Angina  - Evaluate fluid balance, assess for edema, trend weights  Outcome: Progressing  Goal: Absence of cardiac arrhythmias or at baseline  Description: INTERVENTIONS:  - Continuous cardiac monitoring, monitor vital signs, obtain 12 lead EKG if indicated  - Evaluate effectiveness of antiarrhythmic and heart rate control medications as ordered  - Initiate emergency measures for life threatening arrhythmias  - Monitor electrolytes and administer replacement therapy as ordered  Outcome: Progressing     Problem: Patient/Family Goals  Goal: Patient/Family Long Term Goal  Description: Patient's Long Term Goal: return home and not come back to the hospital    Interventions:  - follow up appts  - medication regimen  - See additional Care Plan goals for specific interventions  Outcome: Progressing  Goal: Patient/Family Short Term Goal  Description: Patient's Short Term Goal: breathe more comfortably    Interventions:   - IV Lasix  - cardiac consult  - echo   - See additional Care Plan goals for specific interventions  Outcome: Progressing     Problem: Diabetes/Glucose Control  Goal: Glucose maintained within prescribed range  Description: INTERVENTIONS:  - Monitor Blood Glucose as ordered  - Assess for signs and symptoms of hyperglycemia and hypoglycemia  - Administer ordered medications to maintain glucose within target range  - Assess barriers to adequate nutritional intake and initiate nutrition consult as needed  - Instruct patient on self management of diabetes  Outcome: Progressing

## 2023-01-08 NOTE — PROGRESS NOTES
Patient arrived on unit from [facility] around 1730. Oriented to room. Admission navigator complete with help from son Lazara Resendiz. Patient A&O x3-4. [place]- son taking hearing aids home. Wearing glasses. SPO2 maintained on RA, pt dyspneic with minimal exertion. NSR on tele. Pt denies chest pain at this time. Plan for echo and trend troponins. Primofit in place for urinary frequency/incontinence. Continent of bowel. Pt denies N/V, family reports weight loss/poor appetite. Up with assist and walker. PT and wound care to see. Updated on POC, needs met.

## 2023-01-09 LAB
ANION GAP SERPL CALC-SCNC: 7 MMOL/L (ref 0–18)
BUN BLD-MCNC: 24 MG/DL (ref 7–18)
CALCIUM BLD-MCNC: 8.7 MG/DL (ref 8.5–10.1)
CHLORIDE SERPL-SCNC: 116 MMOL/L (ref 98–112)
CO2 SERPL-SCNC: 20 MMOL/L (ref 21–32)
CREAT BLD-MCNC: 0.94 MG/DL
GFR SERPLBLD BASED ON 1.73 SQ M-ARVRAT: 75 ML/MIN/1.73M2 (ref 60–?)
GLUCOSE BLD-MCNC: 117 MG/DL (ref 70–99)
GLUCOSE BLD-MCNC: 119 MG/DL (ref 70–99)
GLUCOSE BLD-MCNC: 121 MG/DL (ref 70–99)
GLUCOSE BLD-MCNC: 126 MG/DL (ref 70–99)
GLUCOSE BLD-MCNC: 153 MG/DL (ref 70–99)
GLUCOSE BLD-MCNC: 158 MG/DL (ref 70–99)
GLUCOSE BLD-MCNC: 167 MG/DL (ref 70–99)
OSMOLALITY SERPL CALC.SUM OF ELEC: 302 MOSM/KG (ref 275–295)
POTASSIUM SERPL-SCNC: 4.2 MMOL/L (ref 3.5–5.1)
SODIUM SERPL-SCNC: 143 MMOL/L (ref 136–145)

## 2023-01-09 PROCEDURE — 99232 SBSQ HOSP IP/OBS MODERATE 35: CPT | Performed by: HOSPITALIST

## 2023-01-09 RX ORDER — METOPROLOL SUCCINATE 50 MG/1
50 TABLET, EXTENDED RELEASE ORAL
Status: DISCONTINUED | OUTPATIENT
Start: 2023-01-10 | End: 2023-01-10

## 2023-01-09 NOTE — CM/SW NOTE
01/09/23 1200   CM/SW Referral Data   Referral Source Social Work (self-referral)   Reason for Referral Discharge planning   Informant Patient   Patient [address]   Patient lives with Son   Discharge Needs   Anticipated D/C needs Home health care;Subacute rehab; To be determined      HOME SITUATION  Type of Home: House   Home Layout: One level  Lives With: Alone (family visits)  Patient Owned Equipment: Rolling walker  Patient Regularly Uses: Glasses     Prior Level of [place]: Pt poor historian- reports mod ind with RW. Pt denies recent falls however noted to have falls in EMR. Per chart, plans to move into [place] in future. (Per PT evaluation)       Patient is a 81 y/o male who admitted with new onset of congestive heart failure. PT recommending HH. Spoke with patient's son Clay Rivas) to discuss discharge planning. Patient lives in a house alone. He no longer drives and is quite forgetful at times. Jas Maes is working with the [place] to get patient into their housing however there is a \"2-8 month waiting list\". He and patient's daughter are involved with care however Jas Thomas lives a few hours away near [address] [place] [address] and daughter is currently in another hospital having surgery. Discussed PT rec of HH, he prefers [facility] for a short-term rehab stay. Discussed trying for [facility] however disucssed back up plan of HH with [facility] (either hired or through [place]). Provided Yolanda from [address] [place] [address] for Mom's information and caregiver list to Jas Thomas at [email or web address]. Jas Thomas will also reach out to [place] to see if they can assist with caregiver services. Sent referral for [facility] and [facility] in [facility]. Await accepting providers and choice. Will need to complete PASRR. SW will remain available.      COURTNEY Doss  Discharge Planner  [phone]

## 2023-01-09 NOTE — HOME CARE LIAISON
Received referral via [facility] for Home Health services. Spoke w/ patient's son Jennifer Saha and provided with list of [address] providers from [facility], choice is [facility]. Agency reserved in [facility] and contact information placed on AVS.Financial interest disclosure provided. Jennifer Saha discussed that he thinks his dad needs more services and would like for him to go to [facility] first. He is agreeable to [facility] once he returns homes and plans to hire caregiver help as well.  Notified Shivani Weems

## 2023-01-09 NOTE — PLAN OF CARE
Received pt at 0730. Alert to self, disoriented to situation. Needs frequent reorienting. Room air w stats maintaining >90%. Denies pain/SOB. Vitals stable. NSR w BBB on tele. Incontinent, briefed. See flowsheets. Plan IV lasix, monitor I/Os. Wound care to eval toe. Plan of care updated with pt. Safety precautions in place. Instructed to call staff for help. Will continue to monitor. Problem: CARDIOVASCULAR - ADULT  Goal: Maintains optimal cardiac output and hemodynamic stability  Description: INTERVENTIONS:  - Monitor vital signs, rhythm, and trends  - Monitor for bleeding, hypotension and signs of decreased cardiac output  - Evaluate effectiveness of vasoactive medications to optimize hemodynamic stability  - Monitor arterial and/or venous puncture sites for bleeding and/or hematoma  - Assess quality of pulses, skin color and temperature  - Assess for signs of decreased coronary artery perfusion - ex.  Angina  - Evaluate fluid balance, assess for edema, trend weights  Outcome: Progressing  Goal: Absence of cardiac arrhythmias or at baseline  Description: INTERVENTIONS:  - Continuous cardiac monitoring, monitor vital signs, obtain 12 lead EKG if indicated  - Evaluate effectiveness of antiarrhythmic and heart rate control medications as ordered  - Initiate emergency measures for life threatening arrhythmias  - Monitor electrolytes and administer replacement therapy as ordered  Outcome: Progressing     Problem: Patient/Family Goals  Goal: Patient/Family Long Term Goal  Description: Patient's Long Term Goal: return home and not come back to the hospital    Interventions:  - follow up appts  - medication regimen  - See additional Care Plan goals for specific interventions  Outcome: Progressing  Goal: Patient/Family Short Term Goal  Description: Patient's Short Term Goal: breathe more comfortably    Interventions:   - IV Lasix  - cardiac consult  - echo   - See additional Care Plan goals for specific interventions  Outcome: Progressing     Problem: Diabetes/Glucose Control  Goal: Glucose maintained within prescribed range  Description: INTERVENTIONS:  - Monitor Blood Glucose as ordered  - Assess for signs and symptoms of hyperglycemia and hypoglycemia  - Administer ordered medications to maintain glucose within target range  - Assess barriers to adequate nutritional intake and initiate nutrition consult as needed  - Instruct patient on self management of diabetes  Outcome: Progressing

## 2023-01-09 NOTE — DISCHARGE INSTRUCTIONS
Sometimes managing your health at home requires assistance. The [facility] team has recognized your preference to use [facility]. They can be reached by phone at [phone]. The fax number for your reference is [phone]. A representative from the home health agency will contact you or your family to schedule your first visit.

## 2023-01-09 NOTE — DIETARY NOTE
[address]      Pt chart reviewed d/t consult for CHF education. Pt noted to be confused, not appropriate for education at this time. Please consult if patient status changes or nutrition issues arise.     Asif Bay MS, RDN, [facility]  Clinical Dietitian  [phone]

## 2023-01-09 NOTE — PLAN OF CARE
Pt is A/Ox1. Confused and getting out of bed throughout the night thinking he is at home. On room air. O2 sats wnl. Dyspneic with exertion and at rest intermittently. NSR on tele with BBB. Continent. Voiding. Up with sba and walker in the room. Denies pain. All needs met at this time. Problem: CARDIOVASCULAR - ADULT  Goal: Maintains optimal cardiac output and hemodynamic stability  Description: INTERVENTIONS:  - Monitor vital signs, rhythm, and trends  - Monitor for bleeding, hypotension and signs of decreased cardiac output  - Evaluate effectiveness of vasoactive medications to optimize hemodynamic stability  - Monitor arterial and/or venous puncture sites for bleeding and/or hematoma  - Assess quality of pulses, skin color and temperature  - Assess for signs of decreased coronary artery perfusion - ex.  Angina  - Evaluate fluid balance, assess for edema, trend weights  Outcome: Progressing  Goal: Absence of cardiac arrhythmias or at baseline  Description: INTERVENTIONS:  - Continuous cardiac monitoring, monitor vital signs, obtain 12 lead EKG if indicated  - Evaluate effectiveness of antiarrhythmic and heart rate control medications as ordered  - Initiate emergency measures for life threatening arrhythmias  - Monitor electrolytes and administer replacement therapy as ordered  Outcome: Progressing     Problem: Patient/Family Goals  Goal: Patient/Family Long Term Goal  Description: Patient's Long Term Goal: return home and not come back to the hospital    Interventions:  - follow up appts  - medication regimen  - See additional Care Plan goals for specific interventions  Outcome: Progressing  Goal: Patient/Family Short Term Goal  Description: Patient's Short Term Goal: breathe more comfortably    Interventions:   - IV Lasix  - cardiac consult  - echo   - See additional Care Plan goals for specific interventions  Outcome: Progressing     Problem: Diabetes/Glucose Control  Goal: Glucose maintained within prescribed range  Description: INTERVENTIONS:  - Monitor Blood Glucose as ordered  - Assess for signs and symptoms of hyperglycemia and hypoglycemia  - Administer ordered medications to maintain glucose within target range  - Assess barriers to adequate nutritional intake and initiate nutrition consult as needed  - Instruct patient on self management of diabetes  Outcome: Progressing

## 2023-01-10 ENCOUNTER — APPOINTMENT (OUTPATIENT)
Dept: GENERAL RADIOLOGY | Facility: HOSPITAL | Age: 88
End: 2023-01-10
Payer: MEDICARE

## 2023-01-10 ENCOUNTER — APPOINTMENT (OUTPATIENT)
Dept: WOUND CARE | Facility: HOSPITAL | Age: 88
End: 2023-01-10
Attending: NURSE PRACTITIONER
Payer: MEDICARE

## 2023-01-10 LAB
ANION GAP SERPL CALC-SCNC: 10 MMOL/L (ref 0–18)
BASOPHILS # BLD AUTO: 0.05 X10(3) UL (ref 0–0.2)
BASOPHILS NFR BLD AUTO: 0.7 %
BUN BLD-MCNC: 31 MG/DL (ref 7–18)
CALCIUM BLD-MCNC: 9 MG/DL (ref 8.5–10.1)
CHLORIDE SERPL-SCNC: 112 MMOL/L (ref 98–112)
CO2 SERPL-SCNC: 23 MMOL/L (ref 21–32)
CREAT BLD-MCNC: 1.05 MG/DL
EOSINOPHIL # BLD AUTO: 0.26 X10(3) UL (ref 0–0.7)
EOSINOPHIL NFR BLD AUTO: 3.7 %
ERYTHROCYTE [DISTWIDTH] IN BLOOD BY AUTOMATED COUNT: 13 %
GFR SERPLBLD BASED ON 1.73 SQ M-ARVRAT: 65 ML/MIN/1.73M2 (ref 60–?)
GLUCOSE BLD-MCNC: 116 MG/DL (ref 70–99)
GLUCOSE BLD-MCNC: 121 MG/DL (ref 70–99)
GLUCOSE BLD-MCNC: 121 MG/DL (ref 70–99)
GLUCOSE BLD-MCNC: 142 MG/DL (ref 70–99)
HCT VFR BLD AUTO: 44.5 %
HGB BLD-MCNC: 14.6 G/DL
IMM GRANULOCYTES # BLD AUTO: 0.01 X10(3) UL (ref 0–1)
IMM GRANULOCYTES NFR BLD: 0.1 %
LYMPHOCYTES # BLD AUTO: 2.05 X10(3) UL (ref 1–4)
LYMPHOCYTES NFR BLD AUTO: 29.5 %
MCH RBC QN AUTO: 29.6 PG (ref 26–34)
MCHC RBC AUTO-ENTMCNC: 32.8 G/DL (ref 31–37)
MCV RBC AUTO: 90.3 FL
MONOCYTES # BLD AUTO: 0.71 X10(3) UL (ref 0.1–1)
MONOCYTES NFR BLD AUTO: 10.2 %
NEUTROPHILS # BLD AUTO: 3.88 X10 (3) UL (ref 1.5–7.7)
NEUTROPHILS # BLD AUTO: 3.88 X10(3) UL (ref 1.5–7.7)
NEUTROPHILS NFR BLD AUTO: 55.8 %
OSMOLALITY SERPL CALC.SUM OF ELEC: 308 MOSM/KG (ref 275–295)
PLATELET # BLD AUTO: 206 10(3)UL (ref 150–450)
PLATELET # BLD AUTO: 210 10(3)UL (ref 150–450)
POTASSIUM SERPL-SCNC: 3.7 MMOL/L (ref 3.5–5.1)
RBC # BLD AUTO: 4.93 X10(6)UL
SODIUM SERPL-SCNC: 145 MMOL/L (ref 136–145)
WBC # BLD AUTO: 7 X10(3) UL (ref 4–11)

## 2023-01-10 PROCEDURE — 71045 X-RAY EXAM CHEST 1 VIEW: CPT

## 2023-01-10 PROCEDURE — 99232 SBSQ HOSP IP/OBS MODERATE 35: CPT | Performed by: INTERNAL MEDICINE

## 2023-01-10 RX ORDER — POTASSIUM CHLORIDE 20 MEQ/1
40 TABLET, EXTENDED RELEASE ORAL ONCE
Status: COMPLETED | OUTPATIENT
Start: 2023-01-10 | End: 2023-01-10

## 2023-01-10 RX ORDER — METOPROLOL SUCCINATE 25 MG/1
25 TABLET, EXTENDED RELEASE ORAL
Status: DISCONTINUED | OUTPATIENT
Start: 2023-01-11 | End: 2023-01-11

## 2023-01-10 RX ORDER — METOPROLOL SUCCINATE 25 MG/1
25 TABLET, EXTENDED RELEASE ORAL
Status: DISCONTINUED | OUTPATIENT
Start: 2023-01-11 | End: 2023-01-10

## 2023-01-10 RX ORDER — LOSARTAN POTASSIUM 25 MG/1
25 TABLET ORAL DAILY
Status: DISCONTINUED | OUTPATIENT
Start: 2023-01-11 | End: 2023-01-11

## 2023-01-10 NOTE — PLAN OF CARE
A&OX1. Denies pain. Room air. Iv lasix. Wound care to see toe. Will go to [facility] when ready for discharge. Problem: CARDIOVASCULAR - ADULT  Goal: Maintains optimal cardiac output and hemodynamic stability  Description: INTERVENTIONS:  - Monitor vital signs, rhythm, and trends  - Monitor for bleeding, hypotension and signs of decreased cardiac output  - Evaluate effectiveness of vasoactive medications to optimize hemodynamic stability  - Monitor arterial and/or venous puncture sites for bleeding and/or hematoma  - Assess quality of pulses, skin color and temperature  - Assess for signs of decreased coronary artery perfusion - ex.  Angina  - Evaluate fluid balance, assess for edema, trend weights  Outcome: Progressing  Goal: Absence of cardiac arrhythmias or at baseline  Description: INTERVENTIONS:  - Continuous cardiac monitoring, monitor vital signs, obtain 12 lead EKG if indicated  - Evaluate effectiveness of antiarrhythmic and heart rate control medications as ordered  - Initiate emergency measures for life threatening arrhythmias  - Monitor electrolytes and administer replacement therapy as ordered  Outcome: Progressing     Problem: Patient/Family Goals  Goal: Patient/Family Long Term Goal  Description: Patient's Long Term Goal: return home and not come back to the hospital    Interventions:  - follow up appts  - medication regimen  - See additional Care Plan goals for specific interventions  Outcome: Progressing  Goal: Patient/Family Short Term Goal  Description: Patient's Short Term Goal: breathe more comfortably    Interventions:   - IV Lasix  - cardiac consult  - echo   - See additional Care Plan goals for specific interventions  Outcome: Progressing     Problem: Diabetes/Glucose Control  Goal: Glucose maintained within prescribed range  Description: INTERVENTIONS:  - Monitor Blood Glucose as ordered  - Assess for signs and symptoms of hyperglycemia and hypoglycemia  - Administer ordered medications to maintain glucose within target range  - Assess barriers to adequate nutritional intake and initiate nutrition consult as needed  - Instruct patient on self management of diabetes  Outcome: Progressing

## 2023-01-10 NOTE — CM/SW NOTE
Spoke with patient's son and daughter over the phone to discuss discharge planning. Provided them accepting [facility] list from [place] via email. They will review list and follow up with SW regarding choice. They are agreeable with [facility] as back up plan if patient returns home. Suzanne Goldmanruy confirmed he received email with information for [facility]. SW will remain available. Addendum 12pm  Informed by patient's son that their choices are: [address], [address] [place] [address]. Reserved [address] [facility] in [place] and sent message to verify bed availability. Awaiting patient to be medically cleared for discharge. SW will remain available. 2pm  Spoke with patient's son and daughter again. They changed their minds and would like patient to go to [facility] in case he needs LTC under [place] benefits in the future. Reserved in [place]. SW will remain available.        COURTNEY Ellison  Discharge Planner  [phone]

## 2023-01-10 NOTE — PLAN OF CARE
Received pt at 0730. Alert to self, needs frequent reorienting. Room air w stats maintaining >90%. Denies pain. More dyspnea at rest today. Vitals stable. NSR w BBB on tele. See flowsheets. Plan to continue IV lasix, wound care to see. Plan of care updated with pt and daughter via telephone. Safety precautions in place. Video monitoring enabled. Instructed to call staff for help. Will continue to monitor. Problem: CARDIOVASCULAR - ADULT  Goal: Maintains optimal cardiac output and hemodynamic stability  Description: INTERVENTIONS:  - Monitor vital signs, rhythm, and trends  - Monitor for bleeding, hypotension and signs of decreased cardiac output  - Evaluate effectiveness of vasoactive medications to optimize hemodynamic stability  - Monitor arterial and/or venous puncture sites for bleeding and/or hematoma  - Assess quality of pulses, skin color and temperature  - Assess for signs of decreased coronary artery perfusion - ex.  Angina  - Evaluate fluid balance, assess for edema, trend weights  Outcome: Progressing  Goal: Absence of cardiac arrhythmias or at baseline  Description: INTERVENTIONS:  - Continuous cardiac monitoring, monitor vital signs, obtain 12 lead EKG if indicated  - Evaluate effectiveness of antiarrhythmic and heart rate control medications as ordered  - Initiate emergency measures for life threatening arrhythmias  - Monitor electrolytes and administer replacement therapy as ordered  Outcome: Progressing     Problem: Patient/Family Goals  Goal: Patient/Family Long Term Goal  Description: Patient's Long Term Goal: return home and not come back to the hospital    Interventions:  - follow up appts  - medication regimen  - See additional Care Plan goals for specific interventions  Outcome: Progressing  Goal: Patient/Family Short Term Goal  Description: Patient's Short Term Goal: breathe more comfortably    Interventions:   - IV Lasix  - cardiac consult  - echo   - See additional Care Plan goals for specific interventions  Outcome: Progressing     Problem: Diabetes/Glucose Control  Goal: Glucose maintained within prescribed range  Description: INTERVENTIONS:  - Monitor Blood Glucose as ordered  - Assess for signs and symptoms of hyperglycemia and hypoglycemia  - Administer ordered medications to maintain glucose within target range  - Assess barriers to adequate nutritional intake and initiate nutrition consult as needed  - Instruct patient on self management of diabetes  Outcome: Progressing

## 2023-01-11 VITALS
RESPIRATION RATE: 20 BRPM | TEMPERATURE: 98 F | WEIGHT: 146.31 LBS | BODY MASS INDEX: 22 KG/M2 | OXYGEN SATURATION: 88 % | HEART RATE: 53 BPM | DIASTOLIC BLOOD PRESSURE: 48 MMHG | SYSTOLIC BLOOD PRESSURE: 101 MMHG

## 2023-01-11 LAB
ANION GAP SERPL CALC-SCNC: 7 MMOL/L (ref 0–18)
ANION GAP SERPL CALC-SCNC: 8 MMOL/L (ref 0–18)
BASOPHILS # BLD AUTO: 0.04 X10(3) UL (ref 0–0.2)
BASOPHILS NFR BLD AUTO: 0.5 %
BUN BLD-MCNC: 25 MG/DL (ref 7–18)
BUN BLD-MCNC: 30 MG/DL (ref 7–18)
CALCIUM BLD-MCNC: 8.7 MG/DL (ref 8.5–10.1)
CALCIUM BLD-MCNC: 9.2 MG/DL (ref 8.5–10.1)
CHLORIDE SERPL-SCNC: 113 MMOL/L (ref 98–112)
CHLORIDE SERPL-SCNC: 114 MMOL/L (ref 98–112)
CO2 SERPL-SCNC: 21 MMOL/L (ref 21–32)
CO2 SERPL-SCNC: 23 MMOL/L (ref 21–32)
CREAT BLD-MCNC: 1 MG/DL
CREAT BLD-MCNC: 1.27 MG/DL
EOSINOPHIL # BLD AUTO: 0.27 X10(3) UL (ref 0–0.7)
EOSINOPHIL NFR BLD AUTO: 3.7 %
ERYTHROCYTE [DISTWIDTH] IN BLOOD BY AUTOMATED COUNT: 12.9 %
GFR SERPLBLD BASED ON 1.73 SQ M-ARVRAT: 52 ML/MIN/1.73M2 (ref 60–?)
GFR SERPLBLD BASED ON 1.73 SQ M-ARVRAT: 69 ML/MIN/1.73M2 (ref 60–?)
GLUCOSE BLD-MCNC: 110 MG/DL (ref 70–99)
GLUCOSE BLD-MCNC: 117 MG/DL (ref 70–99)
GLUCOSE BLD-MCNC: 118 MG/DL (ref 70–99)
GLUCOSE BLD-MCNC: 134 MG/DL (ref 70–99)
GLUCOSE BLD-MCNC: 142 MG/DL (ref 70–99)
HCT VFR BLD AUTO: 44.7 %
HGB BLD-MCNC: 15.1 G/DL
IMM GRANULOCYTES # BLD AUTO: 0.03 X10(3) UL (ref 0–1)
IMM GRANULOCYTES NFR BLD: 0.4 %
LYMPHOCYTES # BLD AUTO: 2.29 X10(3) UL (ref 1–4)
LYMPHOCYTES NFR BLD AUTO: 31.3 %
MCH RBC QN AUTO: 29.8 PG (ref 26–34)
MCHC RBC AUTO-ENTMCNC: 33.8 G/DL (ref 31–37)
MCV RBC AUTO: 88.2 FL
MONOCYTES # BLD AUTO: 0.71 X10(3) UL (ref 0.1–1)
MONOCYTES NFR BLD AUTO: 9.7 %
NEUTROPHILS # BLD AUTO: 3.98 X10 (3) UL (ref 1.5–7.7)
NEUTROPHILS # BLD AUTO: 3.98 X10(3) UL (ref 1.5–7.7)
NEUTROPHILS NFR BLD AUTO: 54.4 %
OSMOLALITY SERPL CALC.SUM OF ELEC: 301 MOSM/KG (ref 275–295)
OSMOLALITY SERPL CALC.SUM OF ELEC: 304 MOSM/KG (ref 275–295)
PLATELET # BLD AUTO: 224 10(3)UL (ref 150–450)
POTASSIUM SERPL-SCNC: 4.2 MMOL/L (ref 3.5–5.1)
POTASSIUM SERPL-SCNC: 4.4 MMOL/L (ref 3.5–5.1)
POTASSIUM SERPL-SCNC: 4.8 MMOL/L (ref 3.5–5.1)
RBC # BLD AUTO: 5.07 X10(6)UL
SODIUM SERPL-SCNC: 142 MMOL/L (ref 136–145)
SODIUM SERPL-SCNC: 144 MMOL/L (ref 136–145)
WBC # BLD AUTO: 7.3 X10(3) UL (ref 4–11)

## 2023-01-11 PROCEDURE — 99239 HOSP IP/OBS DSCHRG MGMT >30: CPT | Performed by: INTERNAL MEDICINE

## 2023-01-11 RX ORDER — LOSARTAN POTASSIUM 25 MG/1
25 TABLET ORAL DAILY
Qty: 30 TABLET | Refills: 3 | Status: SHIPPED | OUTPATIENT
Start: 2023-01-12

## 2023-01-11 RX ORDER — METOPROLOL SUCCINATE 25 MG/1
25 TABLET, EXTENDED RELEASE ORAL
Qty: 30 TABLET | Refills: 3 | Status: SHIPPED | OUTPATIENT
Start: 2023-01-12

## 2023-01-11 RX ORDER — EPLERENONE 25 MG/1
25 TABLET, FILM COATED ORAL DAILY
Qty: 30 TABLET | Refills: 3 | Status: SHIPPED | OUTPATIENT
Start: 2023-01-12

## 2023-01-11 RX ORDER — TORSEMIDE 20 MG/1
20 TABLET ORAL DAILY
Qty: 30 TABLET | Refills: 3 | Status: SHIPPED | OUTPATIENT
Start: 2023-01-11

## 2023-01-11 RX ORDER — TORSEMIDE 20 MG/1
20 TABLET ORAL DAILY
Status: DISCONTINUED | OUTPATIENT
Start: 2023-01-11 | End: 2023-01-11

## 2023-01-11 NOTE — CM/SW NOTE
Informed by RN that patient is medically cleared for discharge. Spoke with Lu Hamilton from [facility] who confirmed bed available today. RN updating family and finding out if patient needs transport. SW will remain available. Addendum 1pm  Son requesting transportation. Spoke with Radha Shen and soonest  was 7pm (they will update us if something opens sooner). PCS form completed. SW will remain available.     Margarita Uri, patient going to room [address]  Phone: [phone]    Radha Shen  [phone] or Yahir Gao [address] [place] [address]  Discharge Planner  [phone]

## 2023-01-11 NOTE — PLAN OF CARE
PATIENT a&ox2 , on room air , sinus on cardiac monitor, denies any cp/ chest discomfort,  patient comfortable , much better tonight , bed alarm and safety precautions maintained, blood sugar controlled, plan of care updated and comfotable, will monitor closely    Problem: CARDIOVASCULAR - ADULT  Goal: Maintains optimal cardiac output and hemodynamic stability  Description: INTERVENTIONS:  - Monitor vital signs, rhythm, and trends  - Monitor for bleeding, hypotension and signs of decreased cardiac output  - Evaluate effectiveness of vasoactive medications to optimize hemodynamic stability  - Monitor arterial and/or venous puncture sites for bleeding and/or hematoma  - Assess quality of pulses, skin color and temperature  - Assess for signs of decreased coronary artery perfusion - ex.  Angina  - Evaluate fluid balance, assess for edema, trend weights  Outcome: Progressing  Goal: Absence of cardiac arrhythmias or at baseline  Description: INTERVENTIONS:  - Continuous cardiac monitoring, monitor vital signs, obtain 12 lead EKG if indicated  - Evaluate effectiveness of antiarrhythmic and heart rate control medications as ordered  - Initiate emergency measures for life threatening arrhythmias  - Monitor electrolytes and administer replacement therapy as ordered  Outcome: Progressing     Problem: Patient/Family Goals  Goal: Patient/Family Long Term Goal  Description: Patient's Long Term Goal: return home and not come back to the hospital    Interventions:  - follow up appts  - medication regimen  - See additional Care Plan goals for specific interventions  Outcome: Progressing  Goal: Patient/Family Short Term Goal  Description: Patient's Short Term Goal: breathe more comfortably    Interventions:   - IV Lasix  - cardiac consult  - echo   - See additional Care Plan goals for specific interventions  Outcome: Progressing     Problem: Diabetes/Glucose Control  Goal: Glucose maintained within prescribed range  Description: INTERVENTIONS:  - Monitor Blood Glucose as ordered  - Assess for signs and symptoms of hyperglycemia and hypoglycemia  - Administer ordered medications to maintain glucose within target range  - Assess barriers to adequate nutritional intake and initiate nutrition consult as needed  - Instruct patient on self management of diabetes  Outcome: Progressing

## 2023-01-11 NOTE — PLAN OF CARE
Assumed care of patient @ 0730 patient resting in bed, AOX4, reports no pain. Lung sounds but diminished bilaterally with crackles, O2 saturation maintained on room air. No complaints of shortness of breath or chest pain. NSR on tele, S1-S2 present, no adventitious sounds noted. Bowel sounds present and active in all quadrants, BM today. Patient voiding with some urgency and stress incontinence. Pt ambulating with walker. Non pitting edema to bilateral lower extremities. Right great toe with diabetic ulcer. Plan of Care: Possible discharge today to [facility]. Discussed plan of care with patient, verbalized understanding. Call light within reach. Problem: CARDIOVASCULAR - ADULT  Goal: Maintains optimal cardiac output and hemodynamic stability  Description: INTERVENTIONS:  - Monitor vital signs, rhythm, and trends  - Monitor for bleeding, hypotension and signs of decreased cardiac output  - Evaluate effectiveness of vasoactive medications to optimize hemodynamic stability  - Monitor arterial and/or venous puncture sites for bleeding and/or hematoma  - Assess quality of pulses, skin color and temperature  - Assess for signs of decreased coronary artery perfusion - ex.  Angina  - Evaluate fluid balance, assess for edema, trend weights  Outcome: Adequate for Discharge  Goal: Absence of cardiac arrhythmias or at baseline  Description: INTERVENTIONS:  - Continuous cardiac monitoring, monitor vital signs, obtain 12 lead EKG if indicated  - Evaluate effectiveness of antiarrhythmic and heart rate control medications as ordered  - Initiate emergency measures for life threatening arrhythmias  - Monitor electrolytes and administer replacement therapy as ordered  Outcome: Adequate for Discharge     Problem: Patient/Family Goals  Goal: Patient/Family Long Term Goal  Description: Patient's Long Term Goal: return home and not come back to the hospital    Interventions:  - follow up appts  - medication regimen  - See additional Care Plan goals for specific interventions  Outcome: Adequate for Discharge  Goal: Patient/Family Short Term Goal  Description: Patient's Short Term Goal: breathe more comfortably    Interventions:   - IV Lasix  - cardiac consult  - echo   - See additional Care Plan goals for specific interventions  Outcome: Adequate for Discharge     Problem: Diabetes/Glucose Control  Goal: Glucose maintained within prescribed range  Description: INTERVENTIONS:  - Monitor Blood Glucose as ordered  - Assess for signs and symptoms of hyperglycemia and hypoglycemia  - Administer ordered medications to maintain glucose within target range  - Assess barriers to adequate nutritional intake and initiate nutrition consult as needed  - Instruct patient on self management of diabetes  Outcome: Adequate for Discharge

## 2023-01-12 ENCOUNTER — PATIENT OUTREACH (OUTPATIENT)
Dept: CASE MANAGEMENT | Age: 88
End: 2023-01-12

## 2023-01-12 NOTE — PLAN OF CARE
Pt okay to discharge to [facility] per hospitalist and consults. Discharge AVS including medication information, follow-ups, and prescriptions given. Report called to Roxanne at [facility]. IV removed, telemetry discontinued. All belongings taken with patient including wallet and hearing aids. Transported off unit via wheelchair with [employer]

## 2024-03-22 NOTE — PLAN OF CARE
Patient is A&O x4.  Calm and cooperative. [place]-HA at bedside. VSS. On RA. IV-0.9%  ml/hr. IV abx.  2 soft-formed BMs during shift. No s/s of bleeding. Accuchecks. Electrolyte protocol. Plan to DC today. Bed alarm on. Bed at lowest position. Assess pt frequently for physical needs  - Identify cognitive and physical deficits and behaviors that affect risk of falls.   - [place] fall precautions as indicated by assessment.  - Educate pt/family on patient safety including physical limitations  - Statement Selected

## 2024-11-05 NOTE — TELEPHONE ENCOUNTER
Pt's daughter pt was just here for a visit yesterday and the [place] paperwork was filled out incorrectly, there is 2 parts that where left unfilled and need it to be completed, pt's daughter will be faxing the forms back so they can be completed and she will
Waiting on fax.
yes

## (undated) DIAGNOSIS — E11.51 DIABETES TYPE 2 WITH ATHEROSCLEROSIS OF ARTERIES OF EXTREMITIES (HCC): Primary | ICD-10-CM

## (undated) DIAGNOSIS — I70.209 DIABETES TYPE 2 WITH ATHEROSCLEROSIS OF ARTERIES OF EXTREMITIES (HCC): Primary | ICD-10-CM

## (undated) DEVICE — ZIMMER® STERILE DISPOSABLE TOURNIQUET CUFF WITH PLC, DUAL PORT, SINGLE BLADDER, 18 IN. (46 CM)

## (undated) DEVICE — PREMIUM WET SKIN PREP TRAY: Brand: MEDLINE INDUSTRIES, INC.

## (undated) DEVICE — REM POLYHESIVE ADULT PATIENT RETURN ELECTRODE: Brand: VALLEYLAB

## (undated) DEVICE — NON-ADHERENT STRIPS,OIL EMULSION: Brand: CURITY

## (undated) DEVICE — 1200CC GUARDIAN II: Brand: GUARDIAN

## (undated) DEVICE — Device: Brand: SPOT EX ENDOSCOPIC TATTOO

## (undated) DEVICE — SOL  .9 1000ML BTL

## (undated) DEVICE — BLADE SAW KM33-11

## (undated) DEVICE — Device: Brand: DEFENDO AIR/WATER/SUCTION AND BIOPSY VALVE

## (undated) DEVICE — STANDARD HYPODERMIC NEEDLE,POLYPROPYLENE HUB: Brand: MONOJECT

## (undated) DEVICE — NEEDLE CONTRAST INTERJECT 25G

## (undated) DEVICE — LOWER EXTREMITY CDS-LF: Brand: MEDLINE INDUSTRIES, INC.

## (undated) DEVICE — GLOVE BIOGEL M SURG SZ 71/2

## (undated) DEVICE — KENDALL SCD EXPRESS SLEEVES, KNEE LENGTH, MEDIUM: Brand: KENDALL SCD

## (undated) DEVICE — STRETCH BANDAGE ROLL: Brand: DERMACEA

## (undated) NOTE — Clinical Note
FYI, TCM call made, see notes. VIDA attempted to schedule TCM HFU patient declines at this time stating that he is going to call to schedule an appointment himself. VIDA sent message to MD's office.

## (undated) NOTE — MR AVS SNAPSHOT
[address]  [address] [id number] [phone]               Thank you for choosing us for your health care visit with Felix Barone DO.   We are glad to serve you and happy to provide you with this Assoc Dx:  Pain of both elbows [M25.522, M25.521]          Reason for Today's Visit     Follow - Up           Medical Issues Discussed Today     Pain of both elbows    -  Primary    Neurofibroma of lower extremity          Instructions and Information abo Take 1,000 mcg by mouth daily. Commonly known as:  VITAMIN B12           * Notice: This list has 2 medication(s) that are the same as other medications prescribed for you.  Read the directions carefully, and ask your doctor or other care provider to revi

## (undated) NOTE — LETTER
Patient Name: Jolene Ken  YOB: 1927          MRN number:  [id number]  Date:  8/10/2018  Referring Physician:  James Valdez     Discharge Summary    Pt has attended 4, cancelled 1, and no shown 0 visits in Occupational Therapy.      Katarina [facility] 3- increase L thumb IP jnt  To 65 degrees flx to ease sustained tip pinch  8 visits. MET  4- increase L pinch strengths to equal R to ease safe tip pinch 8 visits.   MET     Pt will be independent and compliant with comprehensive HEP to maintain progr

## (undated) NOTE — ED AVS SNAPSHOT
Radha Found   MRN: [id number]    Department:  [facility] Emergency Department   Date of Visit:  10/15/2019           Disclosure     Insurance plans vary and the physician(s) referred by the ER may not be covered by your plan.  Please contact your tell this physician (or your personal doctor if your instructions are to return to your personal doctor) about any new or lasting problems. The primary care or specialist physician will see patients referred from the [facility] Emergency Department.  Daniela Ruiz

## (undated) NOTE — IP AVS SNAPSHOT
[facility] [place] [address] [place], [address] ~ [phone]                Discharge Summary   4/29/2017    Que Strickland           Admission Information        Provider Department    4/29/2017 Vikas Lock MD  4nw-A         Cayetano Anne omega-3 fatty acids 1000 MG Caps   Commonly known as:  FISH OIL        Take  by mouth.  Take two capsules daily                            omeprazole 20 MG Cpdr   Commonly known as:  PRILOSEC        TAKE ONE CAPSULE (20MG) BY MOUTH EVERY DAY    Chaparrita Davis 12.5 (L) (05/01/17)  35.9 (L) (05/01/17)  88.6    (05/01/17)  206.0       Recent Hematology Lab Results (cont.)  (Last 3 results in the past 90 days)    Neutrophil % Lymphocyte % Monocyte % Eosinophil % Basophil % Prelim Neut Abs Final Neut Abs Lymphocyte can help with your Affordable Care Act coverage, as well as all types of Medicaid plans. To get signed up and covered, please call [phone] and ask to get set up for an insurance coverage that is in-network with Leo Sandoval Use:  “Thinning” of blood to prevent clotting within blood vessels, Pain relief   Most common side effects:  Bleeding, stomach upset   What to report to your healthcare team: Unusual bleeding, abdominal pain, dark, loose bowel movements           Blood Sug

## (undated) NOTE — MR AVS SNAPSHOT
[address]  [address] [address] [phone]               Thank you for choosing us for your health care visit with Columba Barker DO.   We are glad to serve you and happy to provide you with this omega-3 fatty acids 1000 MG Caps   Take  by mouth.  Take two capsules daily   Commonly known as:  FISH OIL           omeprazole 20 MG Cpdr   TAKE ONE CAPSULE (20MG) BY MOUTH EVERY DAY   Commonly known as:  PRILOSEC           ONETOUCH ULTRA BLUE Strp   G EAT THESE FOODS MORE OFTEN: EAT THESE FOODS LESS OFTEN:   Make half your plate fruits and vegetables Highly refined, white starches including white bread, rice and pasta   Eat plenty of protein, keep the fat content low Sugars:  sodas and sports drinks, ca

## (undated) NOTE — LETTER
[facility]  [address] [facility], [address] [place] [address]    Consent for Operation    Date: __________________    Time: _______________    1.  I authorize the performance upon Dany Jimenez the following operation:    Procedure(s): Colonoscopy with Ever Cox procedure has been videotaped, the surgeon will obtain the original videotape. The hospital will not be responsible for storage or maintenance of this tape.     6. For the purpose of advancing medical education, I consent to the admittance of observers to t STATEMENTS REQUIRING INSERTION OR COMPLETION WERE FILLED IN.     Signature of Patient:   ___________________________    When the patient is a minor or mentally incompetent to give consent:  Signature of person authorized to consent for patient: ____________ drugs/illegal medications). Failure to inform my anesthesiologist about these medicines may increase my risk of anesthetic complications. · If I am allergic to anything or have had a reaction to anesthesia before.     3. I understand how the anesthesia med I have discussed the procedure and information above with the patient (or patient’s representative) and answered their questions. The patient or their representative has agreed to have anesthesia services.     _______________________________________________

## (undated) NOTE — ED AVS SNAPSHOT
Carlos Rigobertojuanita   MRN: [id number]    Department:  [facility] Emergency Department   Date of Visit:  6/12/2019           Disclosure     Insurance plans vary and the physician(s) referred by the ER may not be covered by your plan.  Please contact your tell this physician (or your personal doctor if your instructions are to return to your personal doctor) about any new or lasting problems. The primary care or specialist physician will see patients referred from the [facility] Emergency Department.  Bi Reeves

## (undated) NOTE — ED AVS SNAPSHOT
Bennett Garcia   MRN: [id number]    Department:  [facility] Emergency Department   Date of Visit:  8/31/2018           Disclosure     Insurance plans vary and the physician(s) referred by the ER may not be covered by your plan.  Please contact your tell this physician (or your personal doctor if your instructions are to return to your personal doctor) about any new or lasting problems. The primary care or specialist physician will see patients referred from the [facility] Emergency Department.  Daniela Ruiz

## (undated) NOTE — MR AVS SNAPSHOT
[address]  [address] [address]  [phone]               Thank you for choosing us for your health care visit with Delma Waite DO.   We are glad to serve you and happy to provide you with this TAKE ONE CAPSULE (20MG) BY MOUTH EVERY DAY   Commonly known as:  PRILOSEC           ONETOUCH ULTRA BLUE Strp   Generic drug:  Glucose Blood   TEST TWO TIMES DAILY           ONETOUCH ULTRASOFT LANCETS Misc   TEST TWO TIMES DAILY           simvastatin 10 MG

## (undated) NOTE — ED AVS SNAPSHOT
Shahla Calero   MRN: [id number]    Department:  [facility] Emergency Department   Date of Visit:  10/9/2019           Disclosure     Insurance plans vary and the physician(s) referred by the ER may not be covered by your plan.  Please contact your tell this physician (or your personal doctor if your instructions are to return to your personal doctor) about any new or lasting problems. The primary care or specialist physician will see patients referred from the [facility] Emergency Department.  Sherrill Torre

## (undated) NOTE — MR AVS SNAPSHOT
[address]  [address] [facility], [place] [id number] [phone]               Thank you for choosing us for your health care visit with Samuel Mcmanus DO.   We are glad to serve you and happy to provide you with this Take  by mouth. Take two tablets daily           omega-3 fatty acids 1000 MG Caps   Take  by mouth.  Take two capsules daily   Commonly known as:  FISH OIL           omeprazole 20 MG Cpdr   TAKE ONE CAPSULE (20MG) BY MOUTH EVERY DAY   Commonly known as:  [place] Laboratory examination ordered as part of a routine general medical examination [539312]           Laboratory examination ordered as part of a routine general medical examination [[id number]]           Laboratory examination ordered as part of a routin

## (undated) NOTE — ED AVS SNAPSHOT
Avery Sheldon   MRN: [id number]    Department:  [facility] Emergency Department   Date of Visit:  8/23/2018           Disclosure     Insurance plans vary and the physician(s) referred by the ER may not be covered by your plan.  Please contact your tell this physician (or your personal doctor if your instructions are to return to your personal doctor) about any new or lasting problems. The primary care or specialist physician will see patients referred from the [facility] Emergency Department.  Jordan Duarte

## (undated) NOTE — IP AVS SNAPSHOT
Patient Demographics     [facility]   [place] [address] Phone  [phone] (Home) *Preferred*  [phone] [facility]) E-mail Address  [email or web address]      Emergency Contact(s)     Name Relation Home Work Mobile    Stefanie Carlos 68 dose due: 9/10      Take 50 mg by mouth daily.    Clive Worthington MD         OneTouch Delica Lancets 86H Misc  Next dose due: 9/10      Test 2 x daily   Kandis Chacon MD         OneTouch Ultra 2 w/Device Kit  Next dose due: 9/10      1 Device by Other route 2 [id number] Insulin Aspart Pen (NOVOLOG) 100 UNIT/ML flexpen 2-10 Units 09/08/21 2147 Given              Recent Vital Signs       Most Recent Value   Vitals  103/40 Filed at 09/09/2021 1158   Pulse  66 Filed at 09/09/2021 1158   Resp  18 Filed at 09/09 was painful. This is why he has presented to the emergency department. He does have a mild headache. He denies any vision changes. He denies any muscle weakness. His main complaint is his neck pain and stiffness.   He was found to have a small right pa plantar nerve 6/29/2012   • Malignant neoplasm of prostate ([facility] [address] 6/29/2012   • Neuropathy    • Nocturnal leg cramps 11/7/2013   • Other abnormal blood chemistry    • Other and unspecified hyperlipidemia    • Other and unspecified hyperlipidemia 6/29/2012 PATIENT WITH PREOPERATIVE ORDER FOR IV ANTIBIOTIC SURGICAL SITE INFECT N/A 2/25/2016    Procedure: CYSTOSCOPY WITH URETHRAL DILATION;  Surgeon: Stanley Willard MD;  Location: [place] [address]      2002 left /2005 [facility] HOUR) 650 MG Oral Tab CR, Take 650 mg by mouth every 8 (eight) hours as needed for Pain., Disp: , Rfl:   ONETOUCH ULTRA BLUE In Vitro Strip, Test 2 times daily, Disp: 100 strip, Rfl: 3  Multiple Vitamins-Minerals (OCUVITE EXTRA OR), Take by mouth., Disp: , 6.8       Estimated Creatinine Clearance: 47.4 mL/min (based on SCr of 0.86 mg/dL).     Recent Labs   Lab 09/07/21  1843   PTP 13.1   INR 0.97       COVID-19 Lab Results    COVID-19  Lab Results   Component Value Date    COVID19 Not Detected 09/07/2021    C 9/8/2021  7:51 AM     Author: Hamlet Evangelista MD Service: Neurosurgery Author Type: Physician    Filed: 9/8/2021  7:51 AM Date of Service: 9/8/2021  7:45 AM Status: Signed    : Hamlet Evangelista MD (Physician)       [facility] 09/07/2021    CA 8.8 09/07/2021    ALB 3.2 09/07/2021    ALKPHO 103 09/07/2021    BILT 0.7 09/07/2021    TP 6.8 09/07/2021    AST 22 09/07/2021    ALT 38 09/07/2021    PTT 30.2 09/07/2021    INR 0.97 09/07/2021    PTP 13.1 09/07/2021    PGLU 80 09/07/2021[ hyperlipidemia    • Other and unspecified hyperlipidemia 6/29/2012   • 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      2002 left /2005 right[KJ. 2]        FAMILY HISTORY:[KJ.1]  family history includes Heart Disease in his mother.[KJ.2]    SOCIAL HISTORY:[KJ.1]   reports that he quit smoking about 41 years ago.  He smok Unknown at Unknown time  ONETOUCH ULTRA BLUE In Vitro Strip, Test 2 times daily, Disp: 100 strip, Rfl: 3  Multiple Vitamins-Minerals (OCUVITE EXTRA OR), Take by mouth., Disp: , Rfl: , Unknown at Unknown time  Mirabegron ER (MYRBETRIQ) 50 MG Oral Tablet 24 Left arm)   Pulse 77   Temp 97.5 °F (36.4 °C) (Oral)   Resp 18   Wt 184 lb 15.5 oz (83.9 kg)   SpO2 96%   BMI 29.85 kg/m²[KJ. 2]   GENERAL:  Well developed, well nourished, rude, in no acute distress. HEENT:  Normocephalic, atraumatic. Neck supple.   No JV in the right parietal lobe sulcus as described above. CT Cervical Spine -   No fracture or dislocation.  Moderate osteoarthritic changes are noted.     Repeat CT Brain -   Small focus of subarachnoid hemorrhage over the right cerebral convexity has decr toe of right foot ([facility] [address] 1/15/2020   • Hyperglycemia 10/27/2019   • Hypertension 1/17/2013   • Hypertension complications 0/29/7359   • Hypertonicity of bladder    • Intervertebral lumbar disc disorder with myelopathy, lumbar region    • Lesion of plantar n MD REBECCA;  Location: [address] [place] [address]   • PATIENT DOCUMENTED NOT TO HAVE EXPERIENCED ANY OF THE FOLLOWING EVENTS N/A 2/25/2016    Procedure: CYSTOSCOPY WITH URETHRAL DILATION;  Surgeon: Bin Ortega MD;  Location: [address] [place] [address]   • NUHA required to correct errors made and decreased awareness of errors   · Awareness of Deficits:  decreased awareness of deficits  · Problem Solving:  assistance required to identify errors made, assistance required to generate solutions and assistance require Score:[EH.1]  Raw Score: 17   Approx Degree of Impairment: 50.57%   Standardized Score (AM-PAC Scale): 42.13   CMS Modifier (G-Code): CK[EH.2]    FUNCTIONAL ABILITY STATUS  Gait Assessment[EH.1]   Gait Assistance: Minimum assistance;Contact guard assist  D answered.     Exercise/Education Provided:  Bed mobility  Energy conservation  Functional activity tolerated  Gait training  Posture  Strengthening  Transfer training[EH.1]    Patient End of Session: Up in chair;Needs met;Call light within reach;RN aware of independent     Goal #4 Patient is able to stair climb 2 steps with no rail modified independently    Goal #5 Patient is able to stair climb a flight of stairs with use of 1 rail modified independently   Goal #6    Goal Comments: Goals established on 9/8/2 Atypical mole 1/17/2013   • Benign essential HTN    • Bronchitis, not specified as acute or chronic    • CAD (coronary artery disease) 1/17/2013   • Cancer [facility])     prostate cancer -seed implants   • Cataract    • Change in voice 2/19/2014   • Chondritis ([facility] 8/26/2014   • Type II or unspecified type diabetes mellitus without mention of complication, not stated as uncontrolled    • Type II or unspecified type diabetes mellitus without mention of complication, uncontrolled    • Unspecified hereditary and i goes down to basement sometimes, walks to neighbors' houses, drives, does own medication management and bill-paying.     SUBJECTIVE   \"If I have any trouble I can just walk over to one of my neighbors and knock on their door\"    OBJECTIVE  Precautions: Be FUNCTIONAL TRANSFER ASSESSMENT  Supine to Sit : Supervision  Sit to Stand: Minimum assistance    Skilled Therapy Provided: Patient received in supine; education on OT role and care plan with good verbal understanding; sitting EOB to L side SBA with HOB fla basic ADL impairment. Research supports that patients with this level of impairment often benefit from JESSICA at discharge.  The patient is below his baseline and would benefit from continued skilled OT to address the activity limitations identified by the AM- will perform supine to sit with Mod I  Patient will perform sit to supine with Mod I  Patient will transfer to toilet with Mod I    ADDITIONAL GOAL  Patient will participate in PHQ9 screening  --------------------------------------------------------------- for specific interventions

## (undated) NOTE — IP AVS SNAPSHOT
[address]            (For Outpatient Use Only) Initial Admit Date: 2023   Inpt/Obs Admit Date: Inpt: 23 / Obs: N/A   Discharge Date:    Hospital Acct:  [de-identified]   MRN: [de-identified]   CSN: [id number]   CEID: [id number]        ENCOUNTER  Patient Class: Inpatient Admitting Provider: Torie Swanson DO Unit: [address] Service: Cardiac Telemetry Attending Provider: Zhanna Rae MD   Bed: 2610-A   Visit Type:   Referring Physician: No ref. provider found Billing Flag:    Admit Diagnosis: Elevated troponin [R77.8]      PATIENT  Legal Name:   Karla Christensen    Legal Sex: Male  Gender ID:              [address] [facility] Name:    PCP:  Shaheed Westbrook MD Home: [phone]   Address:  [place] [address] : 1927 (89 yrs) Mobile: [phone]         City/State/Zip: [address] [place] [address], [address] [place] [address] Marital:  Language: Mary park: Ezra SSN4: [id number] Episcopalian: [address] Not Pamelia Belarusian*     Race: White Ethnicity: Non  Or [address] [place] [address]   Name Relationship Legal Guardian? Home Phone Work Phone Mobile Phone   1. Floyd Castellano  2.  John Lainez  Daughter    [phone]     GUARANTOR  Guarantor: Vicenta Thomas : 1927 Home Phone: [phone]   Address: [place] [address]  Sex: Male Work Phone:    City/State/Zip: [address] [place] [address], [address] [place] [address]   Rel. to Patient: Self Guarantor ID: [id number]   GUARANTOR EMPLOYER   Employer:  Status: RETIRED     COVERAGE  PRIMARY INSURANCE   Payor: MEDICARE Plan: MEDICARE PART A&B   Group Number:  Insurance Type: Abe [id number] Name: Geeta Lozoya : 1927   Subscriber ID: [id number] Pt Rel to Subscriber: Self   SECONDARY INSURANCE   Payor: [facility] PP Plan: [facility]   Group Number: [id number] Insurance Type: Abe Lainez5 Name: Geeta Lozoya : 1927   Subscriber ID: [id number] Pt Rel to Subscriber: SELF   TERTIARY INSURANCE   Payor:  Plan:    Group Number:  Insurance Type:    Subscriber Name:  Subscriber :    Subscriber ID:  Pt Rel to Subscriber:    Hospital Account Financial Class: Medicare    2023

## (undated) NOTE — MR AVS SNAPSHOT
[address]  [address] [facility], [facility] [address] [place] [address] [phone]               Thank you for choosing us for your health care visit with Christina Arreaga DO.   We are glad to serve you and happy to provide you with this omega-3 fatty acids 1000 MG Caps   Take  by mouth.  Take two capsules daily   Commonly known as:  FISH OIL           omeprazole 20 MG Cpdr   TAKE ONE CAPSULE (20MG) BY MOUTH EVERY DAY   Commonly known as:  PRILOSEC           ONETOUCH ULTRA BLUE Strp   G

## (undated) NOTE — IP AVS SNAPSHOT
[address]            (For Outpatient Use Only) Initial Admit Date: 9/7/2021   Inpt/Obs Admit Date: Inpt: 9/7/21 / Obs: N/A   Discharge Date:    Diony Melton:  [de-identified]   MRN: [de-identified]   CSN: [id number]   CEID: [id number] Payor:  Plan:    Group Number:  Insurance Type:    Subscriber Name:  Subscriber :    Subscriber ID:  Pt Rel to Subscriber:    Hospital Account Financial Class: Medicare    2021

## (undated) NOTE — LETTER
23    RE: Shravan Reyes ( 1927)    To whom it may concern,    Please fax above listed patient's medical history, physicals, appointments, immunizations and labs to:    [facility] in [place].  Roney Hannon    Fax [phone]  [facility] [phone]    Thank you    Dr. Ina Lei

## (undated) NOTE — LETTER
Patient Name: Rehan Berrios  YOB: 1927          MRN number:  [id number]  Date:  11/12/2019  Referring Physician:  Michael Marino    Discharge Summary  Pt has attended 5 visits in Physical Therapy.   Dx: Fracture of multiple ribs, Pain L UE · Pt will increase shoulder functional AROM IR to T10 to be able to reach in back pocket, tuck in shirt, and turn steering wheel without pain-MET  · Pt will improve shoulder strength throughout to 4+/5 to improve function with lifting and reaching -MET  ·

## (undated) NOTE — LETTER
Your patient was recently seen at the [facility], [place], operated by [facility] for a hospital follow-up visit. The visit note is attached. Please contact the clinic with any questions at [phone].     Thank you,  YAMILETH Melendez

## (undated) NOTE — LETTER
[facility]  Mark Ruffin [id number] [facility], [address] [place] [address]    Consent for Operation    Date: __________________    Time: _______________    1.  I authorize the performance upon Sarthak Brown the following operation:                        Governor Cisco procedure has been videotaped, the surgeon will obtain the original videotape. The hospital will not be responsible for storage or maintenance of this tape.     6. For the purpose of advancing medical education, I consent to the admittance of observers to t STATEMENTS REQUIRING INSERTION OR COMPLETION WERE FILLED IN.     Signature of Patient:   ___________________________    When the patient is a minor or mentally incompetent to give consent:  Signature of person authorized to consent for patient: ____________ · If I am allergic to anything or have had a reaction to anesthesia before. 3. I understand how the anesthesia medicine will help me (benefits). 4. I understand that with any type of anesthesia medicine there are risks:  a.  The most common risks are: their representative has agreed to have anesthesia services.     _____________________________________________________________________________  Witness        Date   Time  I have verified that the signature is that of the patient or patient’s representative